# Patient Record
Sex: MALE | Race: WHITE | NOT HISPANIC OR LATINO | Employment: OTHER | URBAN - METROPOLITAN AREA
[De-identification: names, ages, dates, MRNs, and addresses within clinical notes are randomized per-mention and may not be internally consistent; named-entity substitution may affect disease eponyms.]

---

## 2020-06-09 ENCOUNTER — TELEPHONE (OUTPATIENT)
Dept: UROLOGY | Facility: MEDICAL CENTER | Age: 65
End: 2020-06-09

## 2020-07-28 ENCOUNTER — TELEPHONE (OUTPATIENT)
Dept: UROLOGY | Facility: AMBULATORY SURGERY CENTER | Age: 65
End: 2020-07-28

## 2020-07-28 ENCOUNTER — OFFICE VISIT (OUTPATIENT)
Dept: UROLOGY | Facility: AMBULATORY SURGERY CENTER | Age: 65
End: 2020-07-28
Payer: COMMERCIAL

## 2020-07-28 VITALS
TEMPERATURE: 98.2 F | BODY MASS INDEX: 26.82 KG/M2 | HEART RATE: 67 BPM | HEIGHT: 72 IN | WEIGHT: 198 LBS | SYSTOLIC BLOOD PRESSURE: 138 MMHG | DIASTOLIC BLOOD PRESSURE: 72 MMHG

## 2020-07-28 DIAGNOSIS — R97.20 ELEVATED PSA: Primary | ICD-10-CM

## 2020-07-28 PROCEDURE — 99204 OFFICE O/P NEW MOD 45 MIN: CPT | Performed by: UROLOGY

## 2020-07-28 RX ORDER — LORAZEPAM 2 MG/1
2 TABLET ORAL ONCE
Qty: 1 TABLET | Refills: 0 | Status: ON HOLD | OUTPATIENT
Start: 2020-07-28 | End: 2020-09-11 | Stop reason: ALTCHOICE

## 2020-07-28 RX ORDER — CIPROFLOXACIN 500 MG/1
500 TABLET, FILM COATED ORAL EVERY 12 HOURS SCHEDULED
Qty: 2 TABLET | Refills: 0 | Status: SHIPPED | OUTPATIENT
Start: 2020-07-28 | End: 2020-07-29

## 2020-07-28 NOTE — PROGRESS NOTES
7/28/2020    Wendi Elder  1955  [id number]        Assessment  Elevated PSA      Discussion  We discussed his PSA of almost 10 in the office today  We discussed his increased risk of prostate cancer  I recommend repeating a PSA, however, the patient is extremely fearful of additional needle sticks and blood draws  He would rather proceed directly with transrectal ultrasound guided biopsy of the prostate based on his prior PSA of 10 rather than repeating the blood work  He also has requested Ativan  I therefore have obtained informed consent today for the prostate biopsy  We discussed the infectious risk associated with the procedure  I prescribe Cipro to take both before and after the procedure  History of Present Illness  72 y o  male with a history of an elevated PSA of 10  He denies family history of prostate cancer  There are no prior PSA levels available for my review  He denies any lower urinary tract symptoms or hematuria  He states that he is otherwise in good health  He reports prior history of an inguinal hernia repair  We had a lengthy discussion in the office today regarding the significance of a PSA of almost 10  AUA Symptom Score      Review of Systems  Review of Systems   Constitutional: Negative  HENT: Negative  Eyes: Negative  Respiratory: Negative  Cardiovascular: Negative  Gastrointestinal: Negative  Endocrine: Negative  Genitourinary:        Per HPI   Musculoskeletal: Negative  Skin: Negative  Allergic/Immunologic: Negative  Neurological: Negative  Hematological: Negative  Psychiatric/Behavioral: Negative  Past Medical History  History reviewed  No pertinent past medical history  Past Social History  History reviewed  No pertinent surgical history  Past Family History  History reviewed  No pertinent family history      Past Social history  Social History     Socioeconomic History    Marital status: Single     Spouse name: Not on file    Number of children: Not on file    Years of education: Not on file    Highest education level: Not on file   Occupational History    Not on file   Social Needs    Financial resource strain: Not on file    Food insecurity:     Worry: Not on file     Inability: Not on file    Transportation needs:     Medical: Not on file     Non-medical: Not on file   Tobacco Use    Smoking status: Never Smoker    Smokeless tobacco: Never Used   Substance and Sexual Activity    Alcohol use: Yes     Alcohol/week: 10 0 standard drinks     Types: 10 Cans of beer per week     Frequency: 2-4 times a month     Drinks per session: 10 or more     Binge frequency: Weekly     Comment: Weekends    Drug use: Yes     Frequency: 2 0 times per week     Types: Marijuana     Comment: Weekends    Sexual activity: Yes     Partners: Male   Lifestyle    Physical activity:     Days per week: Not on file     Minutes per session: Not on file    Stress: Not on file   Relationships    Social connections:     Talks on phone: Not on file     Gets together: Not on file     Attends Religion service: Not on file     Active member of club or organization: Not on file     Attends meetings of clubs or organizations: Not on file     Relationship status: Not on file    Intimate partner violence:     Fear of current or ex partner: Not on file     Emotionally abused: Not on file     Physically abused: Not on file     Forced sexual activity: Not on file   Other Topics Concern    Not on file   Social History Narrative    Not on file       Current Medications  No current outpatient medications on file  No current facility-administered medications for this visit  Allergies  No Known Allergies    Past Medical History, Social History, Family History, medications and allergies were reviewed      Vitals  Vitals:    07/28/20 1451   BP: 138/72   BP Location: Left arm   Patient Position: Sitting   Cuff Size: Adult   Pulse: 67 Temp: 98 2 °F (36 8 °C)   Weight: 89 8 kg (198 lb)   Height: 6' (1 829 m)       Physical Exam  Physical Exam    On examination he is in no acute distress  Gait normal   Affect normal   Abdomen is without surgical scars  Digital rectal examination is deferred until the time of transrectal ultrasound-guided biopsy of the prostate    Neurologic is grossly intact and nonfocal     Results  No results found for: PSA  No results found for: GLUCOSE, CALCIUM, NA, K, CO2, CL, BUN, CREATININE  No results found for: WBC, HGB, HCT, MCV, PLT      Office Urine Dip  No results found for this or any previous visit (from the past 1 hour(s)) ]

## 2020-07-28 NOTE — TELEPHONE ENCOUNTER
PATIENT WAS INQUIRING ABOUT COST OF PROSTATE BIOPSY, I PLACED A CALL TO HIS BS; SPOKE TO SULLY (REF 7/28/20; 3:43PM)  HE WILL ONLY BE BILLED FOR A $40 COPAY  NO AUTH IS REQUIRED; NO DEDUC   EVERYTHING ELSE COVERED %

## 2020-07-28 NOTE — TELEPHONE ENCOUNTER
Due to scheduling change, Patient appointment for today, 7/28/2020, is to be moved from 3:45 to 2:30 pm as per Dr Lolita Stone  Spoke to Patient who was amenable to time change  Repeats back new appointment time

## 2020-08-04 NOTE — TELEPHONE ENCOUNTER
Patient of Dr Sina Gibson seen in [facility] with elevated PSA  Spoke to Patient and offered appointment on 8/12/2020  Patient declined at this time  Relayed his phobia of needles and states he doesn't think he will be able to go through with it awake or even get the antibiotic shot prior even with Ativan  States that he has an appointment with Dr Nuris Arenas on Monday to discuss removal of lipomas on his back in the OR  He would like to request that Dr Sina Gibson join Dr Nuris Arenas in the OR to do prostate biopsy at the same time  Discussed prostate biopsy, OR procedures, and scheduling at length  Patient adamant that he would like to discuss this option with Dr Sina Gibson  Routing to provider to review and advise

## 2020-08-06 NOTE — TELEPHONE ENCOUNTER
Spoke to Dr Db Wilcox who stated he would have his surgical scheduler speak with Dr Lindsay Peterson and see if able to coordinate OR schedule  Attempted to call Patient to inform but unable  LMOM to call office as unable to leave message as per communication consent  If Patient returns call, please inform him of Dr Bailey Ware recommendation and ascertain the full name of his surgeon

## 2020-08-13 ENCOUNTER — TELEPHONE (OUTPATIENT)
Dept: SURGERY | Facility: CLINIC | Age: 65
End: 2020-08-13

## 2020-08-14 ENCOUNTER — TELEPHONE (OUTPATIENT)
Dept: UROLOGY | Facility: AMBULATORY SURGERY CENTER | Age: 65
End: 2020-08-14

## 2020-08-14 ENCOUNTER — TELEPHONE (OUTPATIENT)
Dept: UROLOGY | Facility: MEDICAL CENTER | Age: 65
End: 2020-08-14

## 2020-08-14 NOTE — TELEPHONE ENCOUNTER
Pateint of Dr Sharon Askew twice to speak with nurse    Reason not given   Became irritated when request was made  Warm transfer made

## 2020-08-14 NOTE — TELEPHONE ENCOUNTER
Patient called to report that surgery with Dr Kylee Chawla would not be happening and would take place in his office  Patient was emphatic that he will not have procedure in office and would only do in OR  Informed that would discuss with Dr Eddie Rodney and call him with recommendation  Patient repeats back understanding and agrees with plan

## 2020-08-18 ENCOUNTER — TELEPHONE (OUTPATIENT)
Dept: SURGERY | Facility: CLINIC | Age: 65
End: 2020-08-18

## 2020-08-18 ENCOUNTER — PROCEDURE VISIT (OUTPATIENT)
Dept: SURGERY | Facility: CLINIC | Age: 65
End: 2020-08-18
Payer: COMMERCIAL

## 2020-08-18 VITALS
BODY MASS INDEX: 27.21 KG/M2 | DIASTOLIC BLOOD PRESSURE: 84 MMHG | SYSTOLIC BLOOD PRESSURE: 128 MMHG | TEMPERATURE: 96 F | WEIGHT: 200.6 LBS | RESPIRATION RATE: 18 BRPM | HEART RATE: 64 BPM

## 2020-08-18 DIAGNOSIS — L72.3 SEBACEOUS CYST: Primary | ICD-10-CM

## 2020-08-18 PROCEDURE — 88304 TISSUE EXAM BY PATHOLOGIST: CPT | Performed by: PATHOLOGY

## 2020-08-18 PROCEDURE — 99212 OFFICE O/P EST SF 10 MIN: CPT | Performed by: SURGERY

## 2020-08-18 PROCEDURE — 12031 INTMD RPR S/A/T/EXT 2.5 CM/<: CPT | Performed by: SURGERY

## 2020-08-18 PROCEDURE — 11402 EXC TR-EXT B9+MARG 1.1-2 CM: CPT | Performed by: SURGERY

## 2020-08-18 NOTE — TELEPHONE ENCOUNTER
Holding 9/11 to schedule prostate biopsy at the [address] [place] [address] with Dr Cornel Shaw  Will schedule once orders are placed

## 2020-08-18 NOTE — PROGRESS NOTES
Assessment/Plan:  Sebaceous cyst removed in the office here today     Diagnoses and all orders for this visit:    Sebaceous cyst  -     Skin excision          Subjective:     Patient ID: Jonathan Sneed is a 72 y o  male  The patient is a 22-year-old white male who has a sebaceous cyst on his upper back  This is annoying him and he wishes to have it removed      Review of Systems  noncontributory  Objective:     Physical Exam    Skin excision    Date/Time: 8/18/2020 3:58 PM  Performed by: Julio Cesar Qureshi MD  Authorized by: Julio Cesar Qureshi MD     Procedure Details - Skin Excision:     Number of Lesions:  1  Lesion 1:     Body area:  Trunk    Trunk location:  Back    Initial size (mm):  15       Final defect size (mm):  15    Malignancy: benign lesion      Destruction method: electrosurgery      Closure complexity: intermediate      Surgical defect:  1 5    Repair size (cm):  1 5     The patient was identified by me and placed in the right lateral decubitus position the area was marked and prepped and draped in a normal surgical manner  1% neutralized lidocaine with epinephrine was infused and then elliptical incision was made with a knife and completed with scissors and a Bovie  Base was cauterized and then closure was 2 interrupted Monocryls followed by running Monocryl in the skin    Dermabond was then applied

## 2020-08-19 ENCOUNTER — TELEPHONE (OUTPATIENT)
Dept: UROLOGY | Facility: AMBULATORY SURGERY CENTER | Age: 65
End: 2020-08-19

## 2020-08-19 ENCOUNTER — PREP FOR PROCEDURE (OUTPATIENT)
Dept: UROLOGY | Facility: AMBULATORY SURGERY CENTER | Age: 65
End: 2020-08-19

## 2020-08-19 DIAGNOSIS — R97.20 ELEVATED PSA: Primary | ICD-10-CM

## 2020-08-19 RX ORDER — CEFAZOLIN SODIUM 2 G/50ML
2000 SOLUTION INTRAVENOUS ONCE
Status: CANCELLED | OUTPATIENT
Start: 2020-08-19 | End: 2020-08-19

## 2020-08-19 NOTE — TELEPHONE ENCOUNTER
I called pt and was able to speak with him  I confirmed that I was holding 9/11/20 at the [facility] for his prostate biopsy with Dr Freda West  As soon as Dr Freda West places the orders for this case I will call him back to confirm his pre op instructions and prep information  Pt verbalized understanding and will wait to hear back from me

## 2020-08-20 NOTE — TELEPHONE ENCOUNTER
Patient scheduled for pre op appointment with Dr Enriquez Kidney on 8/27/2020 at 9:15 am in [place]  Please call Patient to inform/confirm appointment

## 2020-08-21 NOTE — TELEPHONE ENCOUNTER
LMOM on home and mobile numbers to call office and office number given  Unable to leave detailed message due to communication consent

## 2020-08-21 NOTE — TELEPHONE ENCOUNTER
Pt called regarding missed call I tried to confirm scheduled appointment,pt questioned what was it for I tried to explain he then proceeded to say the date could be a problem,will send back to clinical member

## 2020-08-21 NOTE — TELEPHONE ENCOUNTER
Spoke to Patient who reported he may have work on 8/27  Discussed importance of pre op appointment and no other appointments available at this time  Patient repeats back understanding and agrees to appointment  Patient is requesting that Dr Olena Yadav be made aware that he has an appointment with Dr Deneen Dudley on Monday for referral for endoscopy  States he would like to have it performed the same time as his biopsy  Informed that would discuss with Dr Olena Yadav and Liz Moreira and discussed surgery/scheduling process  Patient repeats back understanding  Spoke with Dr Olena Yadav - states he will contact Patient to discuss

## 2020-08-24 ENCOUNTER — PREP FOR PROCEDURE (OUTPATIENT)
Dept: UROLOGY | Facility: AMBULATORY SURGERY CENTER | Age: 65
End: 2020-08-24

## 2020-08-24 DIAGNOSIS — R39.89 SUSPECTED UTI: ICD-10-CM

## 2020-08-24 DIAGNOSIS — Z01.810 PRE-OPERATIVE CARDIOVASCULAR EXAMINATION: ICD-10-CM

## 2020-08-24 DIAGNOSIS — R97.20 ELEVATED PSA: Primary | ICD-10-CM

## 2020-08-24 NOTE — TELEPHONE ENCOUNTER
I spoke with pt this afternoon and confirmed that he is scheduled for his prostate biopsy at the [address] [place] [address] on 9/11/20  I verbally went over all of pt 's pre op instructions and prep information with him  While I was informing pt that he needs a EKG done as soon as possible the call was disconnected  I will try to reach pt back a little later today and then I will also mail him a copy of his surgical packet

## 2020-08-27 ENCOUNTER — OFFICE VISIT (OUTPATIENT)
Dept: UROLOGY | Facility: AMBULATORY SURGERY CENTER | Age: 65
End: 2020-08-27
Payer: COMMERCIAL

## 2020-08-27 VITALS
BODY MASS INDEX: 27.09 KG/M2 | HEART RATE: 65 BPM | TEMPERATURE: 97.8 F | SYSTOLIC BLOOD PRESSURE: 128 MMHG | DIASTOLIC BLOOD PRESSURE: 84 MMHG | WEIGHT: 200 LBS | HEIGHT: 72 IN

## 2020-08-27 DIAGNOSIS — R97.20 ELEVATED PSA: Primary | ICD-10-CM

## 2020-08-27 PROCEDURE — 99214 OFFICE O/P EST MOD 30 MIN: CPT | Performed by: UROLOGY

## 2020-08-27 NOTE — PROGRESS NOTES
8/27/2020    Sari [facility]  1955  [id number]        Assessment  Elevated PSA      Discussion  I recommend transrectal ultrasound-guided biopsy of the prostate  The patient is amenable with this plan in the operating room under anesthesia  Risk and benefits of the procedure discussed and reviewed  Informed consent obtained  Preoperative ceftriaxone will be administered on the day of surgery        History of Present Illness  72 y o  male with a history of an elevated PSA of 10  I recommended a transrectal ultrasound-guided biopsy of the prostate  The patient has refused to do the biopsy in the office as he states he easily passes out even with blood draws  He does not wish to repeat a PSA  He wishes to proceed directly with transrectal ultrasound-guided biopsy of the prostate in the operating room with sedation  AUA Symptom Score  AUA SYMPTOM SCORE      Most Recent Value   AUA SYMPTOM SCORE   How often have you had a sensation of not emptying your bladder completely after you finished urinating? 0   How often have you had to urinate again less than two hours after you finished urinating? 0   How often have you found you stopped and started again several times when you urinate?  0   How often have you found it difficult to postpone urination? 0   How often have you had a weak urinary stream?  0   How often have you had to push or strain to begin urination? 0   How many times did you most typically get up to urinate from the time you went to bed at night until the time you got up in the morning? 1   Quality of Life: If you were to spend the rest of your life with your urinary condition just the way it is now, how would you feel about that?  0   AUA SYMPTOM SCORE  1          Review of Systems  Review of Systems   Constitutional: Negative  HENT: Negative  Eyes: Negative  Respiratory: Negative  Cardiovascular: Negative  Gastrointestinal: Negative  Endocrine: Negative  Genitourinary:        Per HPI   Musculoskeletal: Negative  Skin: Negative  Allergic/Immunologic: Negative  Neurological: Negative  Hematological: Negative  Psychiatric/Behavioral: Negative  Past Medical History  Past Medical History:   Diagnosis Date    Anal fissure        Past Social History  Past Surgical History:   Procedure Laterality Date    [facility]     HERNIA REPAIR      inguinal    SPERMATOCELECTOMY  02/2018    right spermatocelectomy and multiple lipoma removal       Past Family History  Family History   Family history unknown: Yes       Past Social history  Social History     Socioeconomic History    Marital status: Single     Spouse name: Not on file    Number of children: Not on file    Years of education: Not on file    Highest education level: Not on file   Occupational History    Occupation: self employed   Social Needs    Financial resource strain: Not on file    Food insecurity     Worry: Not on file     Inability: Not on file   [employer] needs     Medical: Not on file     Non-medical: Not on file   Tobacco Use    Smoking status: Former Smoker     Packs/day: 1 00     Years: 3 00     Pack years: 3 00    Smokeless tobacco: Never Used   Substance and Sexual Activity    Alcohol use:  Yes     Alcohol/week: 10 0 standard drinks     Types: 10 Cans of beer per week     Frequency: 2-4 times a month     Drinks per session: 10 or more     Binge frequency: Weekly     Comment: Weekends    Drug use: Yes     Frequency: 2 0 times per week     Types: Marijuana     Comment: Weekends    Sexual activity: Yes     Partners: Male   Lifestyle    Physical activity     Days per week: Not on file     Minutes per session: Not on file    Stress: Not on file   Relationships    Social connections     Talks on phone: Not on file     Gets together: Not on file     Attends [employer] service: Not on file     Active member of club or organization: Not on file     Attends meetings of clubs or organizations: Not on file     Relationship status: Not on file    Intimate partner violence     Fear of current or ex partner: Not on file     Emotionally abused: Not on file     Physically abused: Not on file     Forced sexual activity: Not on file   Other Topics Concern    Not on file   Social History Narrative    Most recent tobacco use screenin2018    Live alone or with others: with others    Marital status: Single    Occupation: self employed    Are you currently employed: Yes    Alcohol intake: Occasional       Current Medications  Current Outpatient Medications   Medication Sig Dispense Refill    LORazepam (ATIVAN) 2 mg tablet Take 1 tablet (2 mg total) by mouth once for 1 dose Take one hour prior to procedure 1 tablet 0     No current facility-administered medications for this visit  Allergies  No Known Allergies    Past Medical History, Social History, Family History, medications and allergies were reviewed  Vitals  Vitals:    20 0931   BP: 128/84   Pulse: 65   Temp: 97 8 °F (36 6 °C)   Weight: 90 7 kg (200 lb)   Height: 6' (1 829 m)       Physical Exam  Physical Exam    On examination he is in no acute distress  Lungs are clear  Cardiac is regular  Abdomen is soft nontender nondistended   examination reveals no CVA tenderness  Skin is warm  Extremities without edema  Neurologic is grossly intact and nonfocal   Gait normal   Affect nor    Results  No results found for: PSA  No results found for: GLUCOSE, CALCIUM, NA, K, CO2, CL, BUN, CREATININE  No results found for: WBC, HGB, HCT, MCV, PLT      Office Urine Dip  No results found for this or any previous visit (from the past 1 hour(s))  ]      Total visit time was 25 minutes of which over 50% was spent on counseling

## 2020-08-27 NOTE — H&P (VIEW-ONLY)
8/27/2020    [facility]  1955  [id number]        Assessment  Elevated PSA      Discussion  I recommend transrectal ultrasound-guided biopsy of the prostate  The patient is amenable with this plan in the operating room under anesthesia  Risk and benefits of the procedure discussed and reviewed  Informed consent obtained  Preoperative ceftriaxone will be administered on the day of surgery        History of Present Illness  72 y o  male with a history of an elevated PSA of 10  I recommended a transrectal ultrasound-guided biopsy of the prostate  The patient has refused to do the biopsy in the office as he states he easily passes out even with blood draws  He does not wish to repeat a PSA  He wishes to proceed directly with transrectal ultrasound-guided biopsy of the prostate in the operating room with sedation  AUA Symptom Score  AUA SYMPTOM SCORE      Most Recent Value   AUA SYMPTOM SCORE   How often have you had a sensation of not emptying your bladder completely after you finished urinating? 0   How often have you had to urinate again less than two hours after you finished urinating? 0   How often have you found you stopped and started again several times when you urinate?  0   How often have you found it difficult to postpone urination? 0   How often have you had a weak urinary stream?  0   How often have you had to push or strain to begin urination? 0   How many times did you most typically get up to urinate from the time you went to bed at night until the time you got up in the morning? 1   Quality of Life: If you were to spend the rest of your life with your urinary condition just the way it is now, how would you feel about that?  0   AUA SYMPTOM SCORE  1          Review of Systems  Review of Systems   Constitutional: Negative  HENT: Negative  Eyes: Negative  Respiratory: Negative  Cardiovascular: Negative  Gastrointestinal: Negative  Endocrine: Negative  Genitourinary:        Per HPI   Musculoskeletal: Negative  Skin: Negative  Allergic/Immunologic: Negative  Neurological: Negative  Hematological: Negative  Psychiatric/Behavioral: Negative  Past Medical History  Past Medical History:   Diagnosis Date    Anal fissure        Past Social History  Past Surgical History:   Procedure Laterality Date    [facility]     HERNIA REPAIR      inguinal    SPERMATOCELECTOMY  02/2018    right spermatocelectomy and multiple lipoma removal       Past Family History  Family History   Family history unknown: Yes       Past Social history  Social History     Socioeconomic History    Marital status: Single     Spouse name: Not on file    Number of children: Not on file    Years of education: Not on file    Highest education level: Not on file   Occupational History    Occupation: self employed   Social Needs    Financial resource strain: Not on file    Food insecurity     Worry: Not on file     Inability: Not on file   [employer] needs     Medical: Not on file     Non-medical: Not on file   Tobacco Use    Smoking status: Former Smoker     Packs/day: 1 00     Years: 3 00     Pack years: 3 00    Smokeless tobacco: Never Used   Substance and Sexual Activity    Alcohol use:  Yes     Alcohol/week: 10 0 standard drinks     Types: 10 Cans of beer per week     Frequency: 2-4 times a month     Drinks per session: 10 or more     Binge frequency: Weekly     Comment: Weekends    Drug use: Yes     Frequency: 2 0 times per week     Types: Marijuana     Comment: Weekends    Sexual activity: Yes     Partners: Male   Lifestyle    Physical activity     Days per week: Not on file     Minutes per session: Not on file    Stress: Not on file   Relationships    Social connections     Talks on phone: Not on file     Gets together: Not on file     Attends [employer] service: Not on file     Active member of club or organization: Not on file     Attends meetings of clubs or organizations: Not on file     Relationship status: Not on file    Intimate partner violence     Fear of current or ex partner: Not on file     Emotionally abused: Not on file     Physically abused: Not on file     Forced sexual activity: Not on file   Other Topics Concern    Not on file   Social History Narrative    Most recent tobacco use [email or web address]    Live alone or with others: with others    Marital status: Single    Occupation: self employed    Are you currently employed: Yes    Alcohol intake: Occasional       Current Medications  Current Outpatient Medications   Medication Sig Dispense Refill    LORazepam (ATIVAN) 2 mg tablet Take 1 tablet (2 mg total) by mouth once for 1 dose Take one hour prior to procedure 1 tablet 0     No current facility-administered medications for this visit  Allergies  No Known Allergies    Past Medical History, Social History, Family History, medications and allergies were reviewed  Vitals  Vitals:    20 0931   BP: 128/84   Pulse: 65   Temp: 97 8 °F (36 6 °C)   Weight: 90 7 kg (200 lb)   Height: 6' (1 829 m)       Physical Exam  Physical Exam    On examination he is in no acute distress  Lungs are clear  Cardiac is regular  Abdomen is soft nontender nondistended   examination reveals no CVA tenderness  Skin is warm  Extremities without edema  Neurologic is grossly intact and nonfocal   Gait normal   Affect nor    Results  No results found for: PSA  No results found for: GLUCOSE, CALCIUM, NA, K, CO2, CL, BUN, CREATININE  No results found for: WBC, HGB, HCT, MCV, PLT      Office Urine Dip  No results found for this or any previous visit (from the past 1 hour(s))  ]      Total visit time was 25 minutes of which over 50% was spent on counseling

## 2020-08-31 NOTE — PRE-PROCEDURE INSTRUCTIONS
No outpatient medications have been marked as taking for the 9/11/20 encounter [facility] Encounter)  PATIENT TAKES NO MEDICATIONS  MASK AND VISITOR GUIDELINES REVIEWED  REVIEWED  PRINTED SURGICAL INSTRUCTIONS WITH PATIENT , PATIENT VERBALIZED UNDERSTANDING  MEDICATIONS REVIEWED  NO VITAMINS OR NSAIDS ONE WEEK BEFORE SURGERY, NPO AFTER MIDNIGHT   SOAP AND SHOWER INSTRUCTIONS REVIEWED

## 2020-08-31 NOTE — TELEPHONE ENCOUNTER
I called pt this morning to confirm that he received his surgery packet that was mailed to him  Pt confirmed that he did have it and was able to review all of the information  I asked pt when he thought he would be going for his EKG  Pt then stated that Dr Alex Hernandez told him that he did not need any testing prior to this procedure  I did try to explain that was not true and the EKG was anesthesia's guidelines and that anyone over the age of 61 needed to have one prior to surgery, Once again pt stated that he did not need to have any pre op testing done per Dr Alex Hernandez and that I needed to go talk to him  Pt then hung up the phone

## 2020-09-08 ENCOUNTER — TELEPHONE (OUTPATIENT)
Dept: RADIOLOGY | Facility: HOSPITAL | Age: 65
End: 2020-09-08

## 2020-09-10 ENCOUNTER — ANESTHESIA EVENT (OUTPATIENT)
Dept: PERIOP | Facility: HOSPITAL | Age: 65
End: 2020-09-10
Payer: COMMERCIAL

## 2020-09-11 ENCOUNTER — HOSPITAL ENCOUNTER (OUTPATIENT)
Facility: HOSPITAL | Age: 65
Setting detail: OUTPATIENT SURGERY
Discharge: HOME | End: 2020-09-11
Attending: UROLOGY | Admitting: UROLOGY
Payer: COMMERCIAL

## 2020-09-11 ENCOUNTER — ANESTHESIA (OUTPATIENT)
Dept: PERIOP | Facility: HOSPITAL | Age: 65
End: 2020-09-11
Payer: COMMERCIAL

## 2020-09-11 ENCOUNTER — APPOINTMENT (OUTPATIENT)
Dept: RADIOLOGY | Facility: HOSPITAL | Age: 65
End: 2020-09-11
Attending: UROLOGY
Payer: COMMERCIAL

## 2020-09-11 VITALS
HEART RATE: 61 BPM | OXYGEN SATURATION: 99 % | DIASTOLIC BLOOD PRESSURE: 92 MMHG | SYSTOLIC BLOOD PRESSURE: 140 MMHG | RESPIRATION RATE: 18 BRPM | TEMPERATURE: 98.6 F

## 2020-09-11 VITALS — HEART RATE: 69 BPM

## 2020-09-11 DIAGNOSIS — R97.20 ELEVATED PSA: ICD-10-CM

## 2020-09-11 DIAGNOSIS — R97.20 ELEVATED PROSTATE SPECIFIC ANTIGEN (PSA): ICD-10-CM

## 2020-09-11 LAB
ATRIAL RATE: 60 BPM
ATRIAL RATE: 62 BPM
P AXIS: 11 DEGREES
P AXIS: 27 DEGREES
PR INTERVAL: 152 MS
PR INTERVAL: 166 MS
QRS AXIS: -23 DEGREES
QRS AXIS: -23 DEGREES
QRSD INTERVAL: 80 MS
QRSD INTERVAL: 80 MS
QT INTERVAL: 430 MS
QT INTERVAL: 432 MS
QTC INTERVAL: 432 MS
QTC INTERVAL: 436 MS
T WAVE AXIS: -4 DEGREES
T WAVE AXIS: -6 DEGREES
VENTRICULAR RATE: 60 BPM
VENTRICULAR RATE: 62 BPM

## 2020-09-11 PROCEDURE — 76942 ECHO GUIDE FOR BIOPSY: CPT

## 2020-09-11 PROCEDURE — 55700 PR BIOPSY OF PROSTATE,NEEDLE/PUNCH: CPT | Performed by: UROLOGY

## 2020-09-11 PROCEDURE — G0416 PROSTATE BIOPSY, ANY MTHD: HCPCS | Performed by: PATHOLOGY

## 2020-09-11 PROCEDURE — 88344 IMHCHEM/IMCYTCHM EA MLT ANTB: CPT | Performed by: PATHOLOGY

## 2020-09-11 PROCEDURE — 93005 ELECTROCARDIOGRAM TRACING: CPT

## 2020-09-11 PROCEDURE — 93010 ELECTROCARDIOGRAM REPORT: CPT | Performed by: INTERNAL MEDICINE

## 2020-09-11 RX ORDER — CEFAZOLIN SODIUM 2 G/50ML
2000 SOLUTION INTRAVENOUS ONCE
Status: DISCONTINUED | OUTPATIENT
Start: 2020-09-11 | End: 2020-09-11

## 2020-09-11 RX ORDER — ONDANSETRON 2 MG/ML
INJECTION INTRAMUSCULAR; INTRAVENOUS AS NEEDED
Status: DISCONTINUED | OUTPATIENT
Start: 2020-09-11 | End: 2020-09-11

## 2020-09-11 RX ORDER — FENTANYL CITRATE/PF 50 MCG/ML
50 SYRINGE (ML) INJECTION
Status: DISCONTINUED | OUTPATIENT
Start: 2020-09-11 | End: 2020-09-11 | Stop reason: HOSPADM

## 2020-09-11 RX ORDER — MIDAZOLAM HYDROCHLORIDE 2 MG/2ML
INJECTION, SOLUTION INTRAMUSCULAR; INTRAVENOUS AS NEEDED
Status: DISCONTINUED | OUTPATIENT
Start: 2020-09-11 | End: 2020-09-11

## 2020-09-11 RX ORDER — CIPROFLOXACIN 500 MG/1
500 TABLET, FILM COATED ORAL 2 TIMES DAILY
Qty: 6 TABLET | Refills: 0 | Status: SHIPPED | OUTPATIENT
Start: 2020-09-11 | End: 2020-09-14

## 2020-09-11 RX ORDER — LIDOCAINE HYDROCHLORIDE 10 MG/ML
INJECTION, SOLUTION EPIDURAL; INFILTRATION; INTRACAUDAL; PERINEURAL AS NEEDED
Status: DISCONTINUED | OUTPATIENT
Start: 2020-09-11 | End: 2020-09-11

## 2020-09-11 RX ORDER — PROPOFOL 10 MG/ML
INJECTION, EMULSION INTRAVENOUS AS NEEDED
Status: DISCONTINUED | OUTPATIENT
Start: 2020-09-11 | End: 2020-09-11

## 2020-09-11 RX ORDER — ONDANSETRON 2 MG/ML
4 INJECTION INTRAMUSCULAR; INTRAVENOUS ONCE AS NEEDED
Status: DISCONTINUED | OUTPATIENT
Start: 2020-09-11 | End: 2020-09-11 | Stop reason: HOSPADM

## 2020-09-11 RX ORDER — SODIUM CHLORIDE, SODIUM LACTATE, POTASSIUM CHLORIDE, CALCIUM CHLORIDE 600; 310; 30; 20 MG/100ML; MG/100ML; MG/100ML; MG/100ML
50 INJECTION, SOLUTION INTRAVENOUS CONTINUOUS
Status: DISCONTINUED | OUTPATIENT
Start: 2020-09-11 | End: 2020-09-11 | Stop reason: HOSPADM

## 2020-09-11 RX ADMIN — CEFTRIAXONE 1000 MG: 1 INJECTION, POWDER, FOR SOLUTION INTRAMUSCULAR; INTRAVENOUS at 07:39

## 2020-09-11 RX ADMIN — SODIUM CHLORIDE, SODIUM LACTATE, POTASSIUM CHLORIDE, AND CALCIUM CHLORIDE: .6; .31; .03; .02 INJECTION, SOLUTION INTRAVENOUS at 07:38

## 2020-09-11 RX ADMIN — MIDAZOLAM 2 MG: 1 INJECTION INTRAMUSCULAR; INTRAVENOUS at 07:34

## 2020-09-11 RX ADMIN — ONDANSETRON 4 MG: 2 INJECTION INTRAMUSCULAR; INTRAVENOUS at 07:59

## 2020-09-11 RX ADMIN — PROPOFOL 200 MG: 10 INJECTION, EMULSION INTRAVENOUS at 07:44

## 2020-09-11 RX ADMIN — LIDOCAINE HYDROCHLORIDE 50 MG: 10 INJECTION, SOLUTION EPIDURAL; INFILTRATION; INTRACAUDAL; PERINEURAL at 07:42

## 2020-09-11 NOTE — DISCHARGE INSTRUCTIONS
Ronny,    Today you underwent prostate biopsy  Expect some blood in the stool, urine, and in the ejaculate  Ciprofloxacin was prescribed for 3 days  Avoid heavy lifting or exertion for the next 2 days  Call for follow-up in the next 2 weeks to review pathology results  [phone]    Dr Charo Franco    Prostate Gland Needle Biopsy   WHAT YOU NEED TO KNOW:   A prostate gland needle biopsy is a procedure to remove samples of tissue from your prostate gland  The prostate is a gland in men located just below the bladder and surrounds the urethra (tube that carries urine out of the body)  After the samples are removed, they are sent to a lab and tested for cancer  DISCHARGE INSTRUCTIONS:   Medicines:   · Antibiotics: This medicine is given to help treat or prevent an infection caused by bacteria  · Pain medicine: You may be given a prescription medicine to decrease pain  Do not wait until the pain is severe before you take this medicine  · Take your medicine as directed  Contact your healthcare provider if you think your medicine is not helping or if you have side effects  Tell him or her if you are allergic to any medicine  Keep a list of the medicines, vitamins, and herbs you take  Include the amounts, and when and why you take them  Bring the list or the pill bottles to follow-up visits  Carry your medicine list with you in case of an emergency  Follow up with your healthcare provider or urologist as directed: You may need to return for more tests or procedures  Write down your questions so you remember to ask them during your visits  Self-care:   · Eat healthy foods:  Healthy foods include fruits, vegetables, whole-grain breads, low-fat dairy products, beans, lean meats, and fish  Eat foods low in animal fat and saturated fats to help decrease your risk for prostate cancer  · Limit alcohol:  You should limit alcohol to 2 drinks a day   A drink of alcohol is 12 ounces of beer, 5 ounces of wine, or 1½ ounces of liquor  Contact your healthcare provider or urologist if:   · You cannot get an erection  · You feel pain or burning when you urinate  · You feel weak, and your face is hot and red  · You have a fever or chills  · You see blood in your urine, bowel movements, or semen  · Your urine is cloudy or smells foul  · You have questions or concerns about your condition or care  Seek care immediately or call 911 if:   · You bleed from your rectum  · You urinate very little or not at all  · You have pain from your procedure that gets worse, even after you take pain medicine  © 2017 [employer] Information is for End User's use only and may not be sold, redistributed or otherwise used for commercial purposes  All illustrations and images included in CareNotes® are the copyrighted property of [employer]  or [employer]  The above information is an  only  It is not intended as medical advice for individual conditions or treatments  Talk to your doctor, nurse or pharmacist before following any medical regimen to see if it is safe and effective for you

## 2020-09-11 NOTE — OP NOTE
OPERATIVE REPORT  PATIENT NAME: Aamir Ulrich    :  1955  MRN: [id number]  Pt Location: [facility]    SURGERY DATE: 2020    Surgeon(s) and Role:     Roberto Hilario MD - Primary    Preop Diagnosis:  Elevated PSA [R97 20]    Post-Op Diagnosis Codes:     * Elevated PSA [R97 20]    Procedure(s) (LRB):  TRANSRECTAL NEEDLE BIOPSY PROSTATE (TRNBP) (N/A)  Ultrasound-guided    Specimen(s):  ID Type Source Tests Collected by Time Destination   1 : [facility] Tissue Prostate TISSUE EXAM Alex Mckenna MD 202048    2 : [facility] Tissue Prostate TISSUE EXAM Alex Mckenna MD 202049    3 : [facility] Tissue Prostate TISSUE EXAM Alex Mckenna MD 2020 0749    4 : [facility] Tissue Prostate TISSUE EXAM Alex Mckenna MD 2020 0750        Estimated Blood Loss:   Minimal    Drains:  None    Anesthesia Type:   General    Operative Indications:  Elevated PSA [R97 20]      Operative Findings:  53 g prostate standard transrectal ultrasound-guided biopsy of the prostate    Complications:   None    Procedure and Technique:  Ariane Daly is a 80-year-old male with a PSA of 9 8  He refused transrectal ultrasound-guided biopsy in the office without sedation  He states that he was not even amenable to repeating a PSA as he does not like to have blood work performed  He is extremely anxious about having any procedures done without anesthesia  Risk and benefits of transrectal ultrasound-guided biopsy of the prostate in the operating room sedation were discussed and reviewed  Informed consent was obtained  The patient was brought to the operating room on 2020  After the smooth induction of IV anesthesia, the patient was placed into a lateral decubitus position  A time-out was performed with all members of the operative team confirming the patient's identity and procedure to be performed  Intravenous ceftriaxone was administered  Transrectal ultrasonography was performed  The prostate measured 53 g    A standard 12 core biopsy was then performed  Three cores were taken from each peripheral zone and 3 cores from each central zone bilaterally  Overall the patient tolerated the procedure well number no complications  The patient was transferred to the recovery unit in stable condition at the conclusion of the case        Patient Disposition:  Stable recovery room    SIGNATURE: Celi Ch MD  DATE: September 11, 2020  TIME: 7:59 AM

## 2020-09-11 NOTE — ANESTHESIA PREPROCEDURE EVALUATION
Procedure:  TRANSRECTAL NEEDLE BIOPSY PROSTATE (TRNBP) (N/A Abdomen)    Relevant Problems   ANESTHESIA (within normal limits)      CARDIO (within normal limits)      ENDO (within normal limits)      GI/HEPATIC (within normal limits)      HEMATOLOGY (within normal limits)      NEURO/PSYCH (within normal limits)      PULMONARY (within normal limits)        Physical Exam    Airway    Mallampati score: II  TM Distance: >3 FB  Neck ROM: full     Dental   No notable dental hx     Cardiovascular      Pulmonary      Other Findings        Anesthesia Plan  ASA Score- 2     Anesthesia Type- general with ASA Monitors  Additional Monitors:   Airway Plan: LMA  Plan Factors-Exercise tolerance (METS): >4 METS  Chart reviewed  Patient summary reviewed  Patient is not a current smoker  Induction- intravenous  Postoperative Plan- Plan for postoperative opioid use  Planned trial extubation    Informed Consent- Anesthetic plan and risks discussed with patient

## 2020-09-11 NOTE — INTERVAL H&P NOTE
H&P reviewed  After examining the patient I find no changes in the patients condition since the H&P had been written  Vitals:    09/11/20 0552   BP: 157/98   Pulse: 60   Resp: 20   Temp: (!) 96 5 °F (35 8 °C)   SpO2: 100%     Plan for transrectal ultrasound-guided biopsy of the prostate with sedation in the operating room  Risk and benefits of the procedure discussed and reviewed  Informed consent obtained

## 2020-09-11 NOTE — ANESTHESIA POSTPROCEDURE EVALUATION
Post-Op Assessment Note    CV Status:  Stable    Pain management: adequate     Mental Status:  Alert and awake   Hydration Status:  Euvolemic   PONV Controlled:  Controlled   Airway Patency:  Patent      Post Op Vitals Reviewed: Yes      Staff: Anesthesiologist         No complications documented      BP   125/82   Temp  97 0   Pulse  75   Resp      SpO2   98

## 2020-09-14 ENCOUNTER — TELEPHONE (OUTPATIENT)
Dept: UROLOGY | Facility: AMBULATORY SURGERY CENTER | Age: 65
End: 2020-09-14

## 2020-09-14 NOTE — TELEPHONE ENCOUNTER
Post Op Note    Brennon Bliss is a 72 y o  male s/p Transrectal needle biospy performed 09/11/2020  Brennon Bliss is a patient of Dr Karina Pfeiffer and is seen at the [place] office  How would you rate your pain on a scale from 1 to 10, 10 being the worst pain ever? 0  Have you had a fever? No    Have your bowel movements been regular? Yes  Do you have any difficulty urinating? No  Bartolo you have any other questions or concerns that I can address at this time? Patient states he has blood in semen but not in his stool and urine    Reviewed follow up his appointment for 9/29/20

## 2020-09-21 NOTE — TELEPHONE ENCOUNTER
Patient requesting results of Biopsy  Advised results are not in  Patient requesting to speak to nurse

## 2020-09-21 NOTE — TELEPHONE ENCOUNTER
Call returned to patient, I advised that we generally do not discuss pathology results over the phone as there are normally questions for the physician as well as discussion of plan of care  Confirmed appointment for 9/29 with Dr Olena Yadav  Patient also questioned how long he may notice blood in the semen  I advised that this could be for up to six weeks  Patient states it is lighter than it was immediately after biopsy  Advised patient to continue to monitor and to call with any concerns  Patient verbalized understanding and agrees with plan

## 2020-09-29 ENCOUNTER — OFFICE VISIT (OUTPATIENT)
Dept: UROLOGY | Facility: AMBULATORY SURGERY CENTER | Age: 65
End: 2020-09-29
Payer: COMMERCIAL

## 2020-09-29 ENCOUNTER — TELEPHONE (OUTPATIENT)
Dept: UROLOGY | Facility: AMBULATORY SURGERY CENTER | Age: 65
End: 2020-09-29

## 2020-09-29 VITALS
SYSTOLIC BLOOD PRESSURE: 122 MMHG | BODY MASS INDEX: 27.09 KG/M2 | DIASTOLIC BLOOD PRESSURE: 88 MMHG | TEMPERATURE: 98 F | WEIGHT: 200 LBS | HEIGHT: 72 IN

## 2020-09-29 DIAGNOSIS — C61 PROSTATE CANCER (HCC): Primary | ICD-10-CM

## 2020-09-29 PROCEDURE — 99215 OFFICE O/P EST HI 40 MIN: CPT | Performed by: UROLOGY

## 2020-09-29 NOTE — LETTER
September 29, 2020     Emmett Stewart MD  [address]    Patient: Erick Gamble   YOB: 1955   Date of Visit: 9/29/2020       Dear Dr Krzysztof Davis: Thank you for referring Erick Gamble to me for evaluation  Below are my notes for this consultation  If you have questions, please do not hesitate to call me  I look forward to following your patient along with you  Sincerely,        Elio Frank MD        CC: MD Elio Quijano MD  9/29/2020  4:54 PM  Sign when Signing Visit  9/29/2020    Erick Gamble  1955  [id number]        Assessment  [place] 4+3=7/10 prostate cancer      Discussion    I had a lengthy discussion in the office with the patient today regarding his new diagnosis of [place] 4 + 3 disease  We discussed that 4 of 6 cores are positive on the left side of the prostate in up to 80% of the cores  We discussed that this intermediate to high risk prostate cancer requires treatment and that active surveillance is not an option  We discussed either external beam radiation therapy or robot assisted laparoscopic prostatectomy for definitive treatment of his prostate cancer  He is vehemently opposed to undergoing surgery at this time even though I explained to him that at only 72years of age in relatively good health that he may benefit overall in the long-term from surgery  He strongly wishes to consider external beam radiation therapy  In addition we discussed that if he were to have surgical extirpation of his prostate and to have a PSA recurrence thereafter that he could receive adjuvant radiation therapy  We discussed that if he receives radiation therapy upfront that surgery is typically not an option if he has disease recurrence  As part of staging I recommend a CT scan of the abdomen and pelvis  He will be referred to Radiation Oncology    He will return in the next 3 weeks to receive a Lupron injection per NCCN guidelines prior to radiation therapy as he has [place] 4 + 3 disease  He will then require IMRT marker insertion as well as Space Oar in the operating room  The patient is amenable with this plan  History of Present Illness  72 y o  male with a history of a PSA of 9 8 from May 2020  I 1st saw Amber Pandey in the office in July 2020  He did not wish to have the PSA repeated  He recently underwent transrectal ultrasound-guided biopsy of the prostate  This was performed in the operating room with sedation/anesthesia  He returns in follow-up today  He denies any post biopsy sequelae  We discussed and reviewed his pathology report at length in the office today  On the right side of the prostate there are no positive cores  On the left side of the prostate 4 of 6 cores obtained are positive for [place] 4 + 3 involving between 5 in 80% of the cores  [place] pattern 4 is identified in between 60 and 80% of the specimens  The patient states that his father had bladder cancer and underwent cystectomy with ileal conduit  He is extremely hesitant to proceed with any type of surgical intervention  I made it very clear to him that he would not have an ileal conduit if he underwent robot assisted laparoscopic prostatectomy  AUA Symptom Score      Review of Systems  Review of Systems   Constitutional: Negative  HENT: Negative  Eyes: Negative  Respiratory: Negative  Cardiovascular: Negative  Gastrointestinal: Negative  Endocrine: Negative  Genitourinary:        Per HPI   Musculoskeletal: Negative  Skin: Negative  Allergic/Immunologic: Negative  Neurological: Negative  Hematological: Negative  Psychiatric/Behavioral: Negative            Past Medical History  Past Medical History:   Diagnosis Date    Anal fissure        Past Social History  Past Surgical History:   Procedure Laterality Date    [facility]  05/2020    [facility]     HERNIA REPAIR      inguinal    SD BIOPSY OF PROSTATE,NEEDLE/PUNCH N/A 2020    Procedure: TRANSRECTAL NEEDLE BIOPSY PROSTATE (TRNBP); Surgeon: Smiley Arauz MD;  [facility];  Service: Urology    SPERMATOCELECTOMY  2018    right spermatocelectomy and multiple lipoma removal    US GUIDED PROSTATE BIOPSY  2020       Past Family History  Family History   Family history unknown: Yes       Past Social history  Social History     Socioeconomic History    Marital status: Single     Spouse name: Not on file    Number of children: Not on file    Years of education: Not on file    Highest education level: Not on file   Occupational History    Occupation: self employed   Social Needs    Financial resource strain: Not on file    Food insecurity     Worry: Not on file     Inability: Not on file   [employer] needs     Medical: Not on file     Non-medical: Not on file   Tobacco Use    Smoking status: Former Smoker     Packs/day: 1 00     Years: 3 00     Pack years: 3 00     Last attempt to quit:      Years since quittin 7    Smokeless tobacco: Never Used   Substance and Sexual Activity    Alcohol use:  Yes     Alcohol/week: 10 0 standard drinks     Types: 10 Cans of beer per week     Frequency: 2-4 times a month     Drinks per session: 10 or more     Binge frequency: Weekly     Comment: Weekends    Drug use: Yes     Frequency: 2 0 times per week     Types: Marijuana     Comment: Weekends    Sexual activity: Yes     Partners: Female   Lifestyle    Physical activity     Days per week: Not on file     Minutes per session: Not on file    Stress: Not on file   Relationships    Social connections     Talks on phone: Not on file     Gets together: Not on file     Attends Yazidism service: Not on file     Active member of club or organization: Not on file     Attends meetings of clubs or organizations: Not on file     Relationship status: Not on file    Intimate partner violence     Fear of current or ex partner: Not on file Emotionally abused: Not on file     Physically abused: Not on file     Forced sexual activity: Not on file   Other Topics Concern    Not on file   Social History Narrative    Most recent tobacco use screenin2018    Live alone or with others: with others    Marital status: Single    Occupation: self employed    Are you currently employed: Yes    Alcohol intake: Occasional       Current Medications  Current Outpatient Medications   Medication Sig Dispense Refill    Bioflavonoid Products (STEPHANY C PO) Take 1 tablet by mouth daily       No current facility-administered medications for this visit  Allergies  Allergies   Allergen Reactions    Tylenol [Acetaminophen]      RINGING IN EARS       Past Medical History, Social History, Family History, medications and allergies were reviewed  Vitals  Vitals:    20 1558   BP: 122/88   Temp: 98 °F (36 7 °C)   Weight: 90 7 kg (200 lb)   Height: 6' (1 829 m)       Physical Exam  Physical Exam    On examination he is in no acute distress  Gait normal   Affect normal    Results  No results found for: PSA  No results found for: GLUCOSE, CALCIUM, NA, K, CO2, CL, BUN, CREATININE  No results found for: WBC, HGB, HCT, MCV, PLT      Office Urine Dip  No results found for this or any previous visit (from the past 1 hour(s))  ]      Total visit time was 40 minutes of which over 50% was spent on counseling

## 2020-09-29 NOTE — PROGRESS NOTES
9/29/2020    Mayelin Wheeler  1955  [id number]        Assessment  M[place] 4+3=7/10 prostate cancer      Discussion    I had a lengthy discussion in the office with the patient today regarding his new diagnosis of [place] 4 + 3 disease  We discussed that 4 of 6 cores are positive on the left side of the prostate in up to 80% of the cores  We discussed that this intermediate to high risk prostate cancer requires treatment and that active surveillance is not an option  We discussed either external beam radiation therapy or robot assisted laparoscopic prostatectomy for definitive treatment of his prostate cancer  He is vehemently opposed to undergoing surgery at this time even though I explained to him that at only 72years of age in relatively good health that he may benefit overall in the long-term from surgery  He strongly wishes to consider external beam radiation therapy  In addition we discussed that if he were to have surgical extirpation of his prostate and to have a PSA recurrence thereafter that he could receive adjuvant radiation therapy  We discussed that if he receives radiation therapy upfront that surgery is typically not an option if he has disease recurrence  As part of staging I recommend a CT scan of the abdomen and pelvis  He will be referred to Radiation Oncology  He will return in the next 3 weeks to receive a Lupron injection per NCCN guidelines prior to radiation therapy as he has [place] 4 + 3 disease  He will then require IMRT marker insertion as well as Space Oar in the operating room  The patient is amenable with this plan  History of Present Illness  72 y o  male with a history of a PSA of 9 8 from May 2020  I 1st saw Sunny Lombardo in the office in July 2020  He did not wish to have the PSA repeated  He recently underwent transrectal ultrasound-guided biopsy of the prostate  This was performed in the operating room with sedation/anesthesia  He returns in follow-up today    He denies any post biopsy sequelae  We discussed and reviewed his pathology report at length in the office today  On the right side of the prostate there are no positive cores  On the left side of the prostate 4 of 6 cores obtained are positive for [place] 4 + 3 involving between 5 in 80% of the cores  [place] pattern 4 is identified in between 60 and 80% of the specimens  The patient states that his father had bladder cancer and underwent cystectomy with ileal conduit  He is extremely hesitant to proceed with any type of surgical intervention  I made it very clear to him that he would not have an ileal conduit if he underwent robot assisted laparoscopic prostatectomy  AUA Symptom Score      Review of Systems  Review of Systems   Constitutional: Negative  HENT: Negative  Eyes: Negative  Respiratory: Negative  Cardiovascular: Negative  Gastrointestinal: Negative  Endocrine: Negative  Genitourinary:        Per HPI   Musculoskeletal: Negative  Skin: Negative  Allergic/Immunologic: Negative  Neurological: Negative  Hematological: Negative  Psychiatric/Behavioral: Negative  Past Medical History  Past Medical History:   Diagnosis Date    Anal fissure        Past Social History  Past Surgical History:   Procedure Laterality Date    [facility]     HERNIA REPAIR      inguinal    GA BIOPSY OF PROSTATE,NEEDLE/PUNCH N/A 9/11/2020    Procedure: TRANSRECTAL NEEDLE BIOPSY PROSTATE (TRNBP);   Surgeon: Elio Frank MD;  [facility];  Service: Urology    SPERMATOCELECTOMY  02/2018    right spermatocelectomy and multiple lipoma removal    US GUIDED PROSTATE BIOPSY  9/11/2020       Past Family History  Family History   Family history unknown: Yes       Past Social history  Social History     Socioeconomic History    Marital status: Single     Spouse name: Not on file    Number of children: Not on file    Years of education: Not on file    Highest education level: Not on file   Occupational History    Occupation: self employed   Social Needs    Financial resource strain: Not on file    Food insecurity     Worry: Not on file     Inability: Not on file   [employer] needs     Medical: Not on file     Non-medical: Not on file   Tobacco Use    Smoking status: Former Smoker     Packs/day: 1 00     Years: 3 00     Pack years: 3 00     Last attempt to quit: 1973     Years since quittin 7    Smokeless tobacco: Never Used   Substance and Sexual Activity    Alcohol use: Yes     Alcohol/week: 10 0 standard drinks     Types: 10 Cans of beer per week     Frequency: 2-4 times a month     Drinks per session: 10 or more     Binge frequency: Weekly     Comment: Weekends    Drug use: Yes     Frequency: 2 0 times per week     Types: Marijuana     Comment: Weekends    Sexual activity: Yes     Partners: Female   Lifestyle    Physical activity     Days per week: Not on file     Minutes per session: Not on file    Stress: Not on file   Relationships    Social connections     Talks on phone: Not on file     Gets together: Not on file     Attends Christianity service: Not on file     Active member of club or organization: Not on file     Attends meetings of clubs or organizations: Not on file     Relationship status: Not on file    Intimate partner violence     Fear of current or ex partner: Not on file     Emotionally abused: Not on file     Physically abused: Not on file     Forced sexual activity: Not on file   Other Topics Concern    Not on file   Social History Narrative    Most recent tobacco use screenin2018    Live alone or with others: with others    Marital status: Single    Occupation: self employed    Are you currently employed:  Yes    Alcohol intake: Occasional       Current Medications  Current Outpatient Medications   Medication Sig Dispense Refill    Bioflavonoid Products (STEPHANY C PO) Take 1 tablet by mouth daily       No current facility-administered medications for this visit  Allergies  Allergies   Allergen Reactions    Tylenol [Acetaminophen]      RINGING IN EARS       Past Medical History, Social History, Family History, medications and allergies were reviewed  Vitals  Vitals:    09/29/20 1558   BP: 122/88   Temp: 98 °F (36 7 °C)   Weight: 90 7 kg (200 lb)   Height: 6' (1 829 m)       Physical Exam  Physical Exam    On examination he is in no acute distress  Gait normal   Affect normal    Results  No results found for: PSA  No results found for: GLUCOSE, CALCIUM, NA, K, CO2, CL, BUN, CREATININE  No results found for: WBC, HGB, HCT, MCV, PLT      Office Urine Dip  No results found for this or any previous visit (from the past 1 hour(s))  ]      Total visit time was 40 minutes of which over 50% was spent on counseling

## 2020-09-30 ENCOUNTER — PATIENT OUTREACH (OUTPATIENT)
Dept: UROLOGY | Facility: AMBULATORY SURGERY CENTER | Age: 65
End: 2020-09-30

## 2020-09-30 DIAGNOSIS — C61 PROSTATE CANCER (HCC): Primary | ICD-10-CM

## 2020-09-30 NOTE — PROGRESS NOTES
Called Ronny RAMACHANDRAN at [phone]  Made him aware that he will need blood work prior to his CT scan  Left my direct extension and requested a call back if he has questions  Also left same message on home phone [phone]

## 2020-10-01 NOTE — PROGRESS NOTES
Ronny called  He is aware of the order for blood work prior to CT  Reviewed all of his pending appointments

## 2020-10-01 NOTE — PROGRESS NOTES
He had BM 5/20 that was normal   He is petrified of blood work and passes out  I am not sure if he needs another BMP  Thank you    FT

## 2020-10-05 ENCOUNTER — PATIENT OUTREACH (OUTPATIENT)
Dept: UROLOGY | Facility: AMBULATORY SURGERY CENTER | Age: 65
End: 2020-10-05

## 2020-10-05 ENCOUNTER — APPOINTMENT (OUTPATIENT)
Dept: LAB | Facility: HOSPITAL | Age: 65
End: 2020-10-05
Payer: COMMERCIAL

## 2020-10-05 ENCOUNTER — TELEPHONE (OUTPATIENT)
Dept: UROLOGY | Facility: AMBULATORY SURGERY CENTER | Age: 65
End: 2020-10-05

## 2020-10-05 DIAGNOSIS — C61 PROSTATE CANCER (HCC): ICD-10-CM

## 2020-10-05 DIAGNOSIS — R39.89 SUSPECTED UTI: ICD-10-CM

## 2020-10-05 DIAGNOSIS — R97.20 ELEVATED PSA: ICD-10-CM

## 2020-10-05 DIAGNOSIS — Z01.810 PRE-OPERATIVE CARDIOVASCULAR EXAMINATION: ICD-10-CM

## 2020-10-05 LAB
ANION GAP SERPL CALCULATED.3IONS-SCNC: 5 MMOL/L (ref 4–13)
BUN SERPL-MCNC: 18 MG/DL (ref 6–20)
CALCIUM SERPL-MCNC: 9.3 MG/DL (ref 8.4–10.2)
CHLORIDE SERPL-SCNC: 105 MMOL/L (ref 96–108)
CO2 SERPL-SCNC: 29 MMOL/L (ref 22–33)
CREAT SERPL-MCNC: 0.91 MG/DL (ref 0.5–1.2)
GFR SERPL CREATININE-BSD FRML MDRD: 88 ML/MIN/1.73SQ M
GLUCOSE P FAST SERPL-MCNC: 93 MG/DL (ref 70–100)
POTASSIUM SERPL-SCNC: 4.6 MMOL/L (ref 3.5–5)
SODIUM SERPL-SCNC: 139 MMOL/L (ref 133–145)

## 2020-10-05 PROCEDURE — 80048 BASIC METABOLIC PNL TOTAL CA: CPT

## 2020-10-05 PROCEDURE — 36415 COLL VENOUS BLD VENIPUNCTURE: CPT

## 2020-10-06 ENCOUNTER — HOSPITAL ENCOUNTER (OUTPATIENT)
Dept: CT IMAGING | Facility: HOSPITAL | Age: 65
Discharge: HOME | End: 2020-10-06
Payer: COMMERCIAL

## 2020-10-06 DIAGNOSIS — C61 PROSTATE CANCER (HCC): ICD-10-CM

## 2020-10-06 PROCEDURE — G1004 CDSM NDSC: HCPCS

## 2020-10-06 PROCEDURE — 74177 CT ABD & PELVIS W/CONTRAST: CPT

## 2020-10-06 RX ADMIN — IOHEXOL 100 ML: 350 INJECTION, SOLUTION INTRAVENOUS at 17:22

## 2020-10-13 ENCOUNTER — TRANSCRIBE ORDERS (OUTPATIENT)
Dept: ADMINISTRATIVE | Facility: HOSPITAL | Age: 65
End: 2020-10-13

## 2020-10-13 ENCOUNTER — LAB (OUTPATIENT)
Dept: LAB | Facility: HOSPITAL | Age: 65
End: 2020-10-13
Payer: COMMERCIAL

## 2020-10-13 DIAGNOSIS — C61 MALIGNANT NEOPLASM OF PROSTATE (HCC): Primary | ICD-10-CM

## 2020-10-13 DIAGNOSIS — C61 MALIGNANT NEOPLASM OF PROSTATE (HCC): ICD-10-CM

## 2020-10-13 LAB — PSA SERPL-MCNC: 7.9 NG/ML (ref 0–4)

## 2020-10-13 PROCEDURE — G0103 PSA SCREENING: HCPCS

## 2020-10-13 PROCEDURE — 36415 COLL VENOUS BLD VENIPUNCTURE: CPT

## 2020-10-13 PROCEDURE — 87086 URINE CULTURE/COLONY COUNT: CPT

## 2020-10-14 ENCOUNTER — TELEPHONE (OUTPATIENT)
Dept: UROLOGY | Facility: AMBULATORY SURGERY CENTER | Age: 65
End: 2020-10-14

## 2020-10-14 ENCOUNTER — PATIENT OUTREACH (OUTPATIENT)
Dept: UROLOGY | Facility: AMBULATORY SURGERY CENTER | Age: 65
End: 2020-10-14

## 2020-10-14 DIAGNOSIS — C61 PROSTATE CANCER (HCC): Primary | ICD-10-CM

## 2020-10-14 LAB — BACTERIA UR CULT: NORMAL

## 2020-10-17 ENCOUNTER — HOSPITAL ENCOUNTER (OUTPATIENT)
Dept: RADIOLOGY | Facility: HOSPITAL | Age: 65
Discharge: HOME | End: 2020-10-17
Attending: UROLOGY
Payer: COMMERCIAL

## 2020-10-17 DIAGNOSIS — C61 PROSTATE CANCER (HCC): ICD-10-CM

## 2020-10-17 PROCEDURE — G1004 CDSM NDSC: HCPCS

## 2020-10-17 PROCEDURE — A9503 TC99M MEDRONATE: HCPCS

## 2020-10-17 PROCEDURE — 78306 BONE IMAGING WHOLE BODY: CPT

## 2020-10-20 ENCOUNTER — DOCUMENTATION (OUTPATIENT)
Dept: UROLOGY | Facility: AMBULATORY SURGERY CENTER | Age: 65
End: 2020-10-20

## 2020-10-21 ENCOUNTER — TELEPHONE (OUTPATIENT)
Dept: UROLOGY | Facility: AMBULATORY SURGERY CENTER | Age: 65
End: 2020-10-21

## 2020-10-21 ENCOUNTER — OFFICE VISIT (OUTPATIENT)
Dept: UROLOGY | Facility: AMBULATORY SURGERY CENTER | Age: 65
End: 2020-10-21
Payer: COMMERCIAL

## 2020-10-21 VITALS
HEART RATE: 62 BPM | WEIGHT: 200 LBS | TEMPERATURE: 97.5 F | DIASTOLIC BLOOD PRESSURE: 88 MMHG | SYSTOLIC BLOOD PRESSURE: 122 MMHG | BODY MASS INDEX: 27.09 KG/M2 | HEIGHT: 72 IN

## 2020-10-21 DIAGNOSIS — C61 PROSTATE CANCER (HCC): Primary | ICD-10-CM

## 2020-10-21 PROCEDURE — 99214 OFFICE O/P EST MOD 30 MIN: CPT | Performed by: UROLOGY

## 2020-10-21 RX ORDER — PANTOPRAZOLE SODIUM 40 MG/1
40 TABLET, DELAYED RELEASE ORAL DAILY
COMMUNITY
Start: 2020-09-24 | End: 2021-01-19 | Stop reason: SDUPTHER

## 2020-10-22 ENCOUNTER — PREP FOR PROCEDURE (OUTPATIENT)
Dept: OTHER | Facility: HOSPITAL | Age: 65
End: 2020-10-22

## 2020-10-22 DIAGNOSIS — C61 PROSTATE CANCER (HCC): Primary | ICD-10-CM

## 2020-10-22 RX ORDER — SODIUM CHLORIDE 9 MG/ML
125 INJECTION, SOLUTION INTRAVENOUS CONTINUOUS
Status: CANCELLED | OUTPATIENT
Start: 2020-10-22

## 2020-10-26 ENCOUNTER — PREP FOR PROCEDURE (OUTPATIENT)
Dept: UROLOGY | Facility: AMBULATORY SURGERY CENTER | Age: 65
End: 2020-10-26

## 2020-10-26 DIAGNOSIS — Z01.818 PREOP EXAMINATION: ICD-10-CM

## 2020-10-26 DIAGNOSIS — Z51.81 MONITORING FOR LONG-TERM ANTICOAGULANT USE: ICD-10-CM

## 2020-10-26 DIAGNOSIS — Z79.01 MONITORING FOR LONG-TERM ANTICOAGULANT USE: ICD-10-CM

## 2020-10-26 DIAGNOSIS — Z11.59 SCREENING FOR VIRAL DISEASE: ICD-10-CM

## 2020-10-26 DIAGNOSIS — C61 PROSTATE CANCER (HCC): Primary | ICD-10-CM

## 2020-11-02 ENCOUNTER — TELEPHONE (OUTPATIENT)
Dept: UROLOGY | Facility: AMBULATORY SURGERY CENTER | Age: 65
End: 2020-11-02

## 2020-11-02 NOTE — PRE-PROCEDURE INSTRUCTIONS
Pre-Surgery Instructions:   Medication Instructions    Bioflavonoid Products (STEPHANY C PO) Instructed patient per Anesthesia Guidelines   pantoprazole (PROTONIX) 40 mg tablet Instructed patient per Anesthesia Guidelines  Have you had / have a sore throat? No  have you had / have a cough less than 1 week? No  Have you had / have a fever greater than 100 0 - 100  4? No  Are you experiencing any shortness of breath? No    Review with patient via phone medications and showering instructions  Advice stop vitamins and don't  take NSAID'S week prior day of surgery  Advice need pre op  Testing  Review Prostate class IS,Villagran catheter care,etc  Advice ASC call with surgery schedule time  Verbalized understanding  Vitamin Med Class   You may continue to take any vitamin that your surgeon has prescribed to you up to the day before surgery   If your surgeon has not specifically prescribed this vitamin or instructed you to continue then stop taking 7 days prior to surgery

## 2020-11-10 ENCOUNTER — OFFICE VISIT (OUTPATIENT)
Dept: UROLOGY | Facility: AMBULATORY SURGERY CENTER | Age: 65
End: 2020-11-10
Payer: COMMERCIAL

## 2020-11-10 VITALS
SYSTOLIC BLOOD PRESSURE: 142 MMHG | HEART RATE: 74 BPM | DIASTOLIC BLOOD PRESSURE: 92 MMHG | HEIGHT: 72 IN | TEMPERATURE: 97.3 F | BODY MASS INDEX: 26.82 KG/M2 | WEIGHT: 198 LBS

## 2020-11-10 DIAGNOSIS — C61 PROSTATE CANCER (HCC): Primary | ICD-10-CM

## 2020-11-10 PROCEDURE — 99215 OFFICE O/P EST HI 40 MIN: CPT | Performed by: UROLOGY

## 2021-01-12 ENCOUNTER — OFFICE VISIT (OUTPATIENT)
Dept: UROLOGY | Facility: AMBULATORY SURGERY CENTER | Age: 66
End: 2021-01-12
Payer: COMMERCIAL

## 2021-01-12 VITALS
SYSTOLIC BLOOD PRESSURE: 132 MMHG | BODY MASS INDEX: 26.82 KG/M2 | WEIGHT: 198 LBS | DIASTOLIC BLOOD PRESSURE: 78 MMHG | HEIGHT: 72 IN | HEART RATE: 82 BPM

## 2021-01-12 DIAGNOSIS — C61 PROSTATE CANCER (HCC): Primary | ICD-10-CM

## 2021-01-12 PROCEDURE — 99214 OFFICE O/P EST MOD 30 MIN: CPT | Performed by: UROLOGY

## 2021-01-12 NOTE — H&P (VIEW-ONLY)
1/12/2021    Eladio Prasad  1955  [id number]        Assessment  [place] 4+3=7/10 prostate cancer      Discussion  Enmanuel Valerio is scheduled to undergo robot assisted laparoscopic prostatectomy with bilateral pelvic lymph node dissection  on January 20, 2021  Risk and benefits of the procedure were discussed and reviewed at length including, but not limited to, incontinence, erectile dysfunction, and disease recurrence  Informed consent was obtained  History of Present Illness  72 y o  male with a history of an elevated PSA  His PSA in early 2020 was noted to be just below 10  This prompted a transrectal ultrasound-guided biopsy of the prostate which was ultimately performed in September 2020 is the patient required sedation/anesthesia in the operating room  Pathology revealed 4 of 12 cores positive with [place] 4 + 3 disease involving between 5 in 90% of the core  All cancer was identified on the left side of the prostate  The patient has moderate erectile dysfunction  He denies any lower urinary tract symptoms  He was previously scheduled for surgery but canceled the date  For preoperative planning he had a CT scan which showed a suspicious lesion in the bone of the pelvis  A follow-up bone scan was negative for metastatic disease  AUA Symptom Score      Review of Systems  Review of Systems   Constitutional: Negative  HENT: Negative  Eyes: Negative  Respiratory: Negative  Cardiovascular: Negative  Gastrointestinal: Negative  Endocrine: Negative  Genitourinary:        Per HPI   Musculoskeletal: Negative  Skin: Negative  Allergic/Immunologic: Negative  Neurological: Negative  Hematological: Negative  Psychiatric/Behavioral: Negative            Past Medical History  Past Medical History:   Diagnosis Date    Anal fissure     Colon polyp     GERD (gastroesophageal reflux disease)        Past Social History  Past Surgical History:   Procedure Laterality Date    [facility]     EGD      HERNIA REPAIR      inguinal    ND BIOPSY OF PROSTATE,NEEDLE/PUNCH N/A 2020    Procedure: TRANSRECTAL NEEDLE BIOPSY PROSTATE (TRNBP); Surgeon: Jose York MD;  Location:  MAIN OR;  Service: Urology    SPERMATOCELECTOMY  2018    right spermatocelectomy and multiple lipoma removal    US GUIDED PROSTATE BIOPSY  2020       Past Family History  Family History   Family history unknown: Yes       Past Social history  Social History     Socioeconomic History    Marital status: Single     Spouse name: Not on file    Number of children: Not on file    Years of education: Not on file    Highest education level: Not on file   Occupational History    Occupation: self employed   Social Needs    Financial resource strain: Not on file    Food insecurity     Worry: Not on file     Inability: Not on file   [employer] needs     Medical: Not on file     Non-medical: Not on file   Tobacco Use    Smoking status: Former Smoker     Packs/day: 1 00     Years: 3 00     Pack years: 3 00     Quit date:      Years since quittin 0    Smokeless tobacco: Never Used   Substance and Sexual Activity    Alcohol use:  Yes     Alcohol/week: 10 0 standard drinks     Types: 10 Cans of beer per week     Frequency: 2-4 times a month     Drinks per session: 10 or more     Binge frequency: Weekly     Comment: Weekends    Drug use: Yes     Frequency: 2 0 times per week     Types: Marijuana     Comment: Weekends    Sexual activity: Yes     Partners: Female   Lifestyle    Physical activity     Days per week: Not on file     Minutes per session: Not on file    Stress: Not on file   Relationships    Social connections     Talks on phone: Not on file     Gets together: Not on file     Attends Ep[employer]opalian service: Not on file     Active member of club or organization: Not on file     Attends meetings of clubs or organizations: Not on file     Relationship status: Not on file    Intimate partner violence     Fear of current or ex partner: Not on file     Emotionally abused: Not on file     Physically abused: Not on file     Forced sexual activity: Not on file   Other Topics Concern    Not on file   Social History Narrative    Most recent tobacco use screenin2018    Live alone or with others: with others    Marital status: Single    Occupation: self employed    Are you currently employed: Yes    Alcohol intake: Occasional       Current Medications  Current Outpatient Medications   Medication Sig Dispense Refill    Bioflavonoid Products (STEPHANY C PO) Take 1 tablet by mouth daily      pantoprazole (PROTONIX) 40 mg tablet Take 40 mg by mouth daily       No current facility-administered medications for this visit  Allergies  Allergies   Allergen Reactions    Other      DUST    Tylenol [Acetaminophen]      RINGING IN EARS       Past Medical History, Social History, Family History, medications and allergies were reviewed  Vitals  Vitals:    21 0854   BP: 132/78   Pulse: 82   Weight: 89 8 kg (198 lb)   Height: 6' (1 829 m)       Physical Exam  Physical Exam  On examination he is in no acute distress  Lungs are clear  Cardiac is regular  Abdomen is soft nontender nondistended  A right inguinal hernia scars noted  Skin is warm  Extremities without edema  Neurologic is grossly intact and nonfocal   Gait normal   Affect normal      Results  Lab Results   Component Value Date    PSA 7 9 (H) 10/13/2020     Lab Results   Component Value Date    CALCIUM 9 3 10/05/2020    K 4 6 10/05/2020    CO2 29 10/05/2020     10/05/2020    BUN 18 10/05/2020    CREATININE 0 91 10/05/2020     No results found for: WBC, HGB, HCT, MCV, PLT      Office Urine Dip  No results found for this or any previous visit (from the past 1 hour(s))  ]      Total visit time was 25 minutes of which over 50% was spent on counseling

## 2021-01-12 NOTE — PROGRESS NOTES
1/12/2021    Kaylee Meyers  1955  [id number]        Assessment  [place] 4+3=7/10 prostate cancer      Discussion  Srikanth Cantu is scheduled to undergo robot assisted laparoscopic prostatectomy with bilateral pelvic lymph node dissection  on January 20, 2021  Risk and benefits of the procedure were discussed and reviewed at length including, but not limited to, incontinence, erectile dysfunction, and disease recurrence  Informed consent was obtained  History of Present Illness  72 y o  male with a history of an elevated PSA  His PSA in early 2020 was noted to be just below 10  This prompted a transrectal ultrasound-guided biopsy of the prostate which was ultimately performed in September 2020 is the patient required sedation/anesthesia in the operating room  Pathology revealed 4 of 12 cores positive with [place] 4 + 3 disease involving between 5 in 90% of the core  All cancer was identified on the left side of the prostate  The patient has moderate erectile dysfunction  He denies any lower urinary tract symptoms  He was previously scheduled for surgery but canceled the date  For preoperative planning he had a CT scan which showed a suspicious lesion in the bone of the pelvis  A follow-up bone scan was negative for metastatic disease  AUA Symptom Score      Review of Systems  Review of Systems   Constitutional: Negative  HENT: Negative  Eyes: Negative  Respiratory: Negative  Cardiovascular: Negative  Gastrointestinal: Negative  Endocrine: Negative  Genitourinary:        Per HPI   Musculoskeletal: Negative  Skin: Negative  Allergic/Immunologic: Negative  Neurological: Negative  Hematological: Negative  Psychiatric/Behavioral: Negative            Past Medical History  Past Medical History:   Diagnosis Date    Anal fissure     Colon polyp     GERD (gastroesophageal reflux disease)        Past Social History  Past Surgical History:   Procedure Laterality Date    COL[facility]COPY  2020    [facility]     EGD      HERNIA REPAIR      inguinal    MS BIOPSY OF PROSTATE,NEEDLE/PUNCH N/A 2020    Procedure: TRANSRECTAL NEEDLE BIOPSY PROSTATE (TRNBP); Surgeon: Chuck Cifuentes MD;  Location:  MAIN OR;  Service: Urology    SPERMATOCELECTOMY  2018    right spermatocelectomy and multiple lipoma removal    US GUIDED PROSTATE BIOPSY  2020       Past Family History  Family History   Family history unknown: Yes       Past Social history  Social History     Socioeconomic History    Marital status: Single     Spouse name: Not on file    Number of children: Not on file    Years of education: Not on file    Highest education level: Not on file   Occupational History    Occupation: self employed   Social Needs    Financial resource strain: Not on file    Food insecurity     Worry: Not on file     Inability: Not on file   [employer] needs     Medical: Not on file     Non-medical: Not on file   Tobacco Use    Smoking status: Former Smoker     Packs/day: 1 00     Years: 3 00     Pack years: 3 00     Quit date:      Years since quittin 0    Smokeless tobacco: Never Used   Substance and Sexual Activity    Alcohol use:  Yes     Alcohol/week: 10 0 standard drinks     Types: 10 Cans of beer per week     Frequency: 2-4 times a month     Drinks per session: 10 or more     Binge frequency: Weekly     Comment: Weekends    Drug use: Yes     Frequency: 2 0 times per week     Types: Marijuana     Comment: Weekends    Sexual activity: Yes     Partners: Female   Lifestyle    Physical activity     Days per week: Not on file     Minutes per session: Not on file    Stress: Not on file   Relationships    Social connections     Talks on phone: Not on file     Gets together: Not on file     Attends Christianity service: Not on file     Active member of club or organization: Not on file     Attends meetings of clubs or organizations: Not on file     Relationship status: Not on file    Intimate partner violence     Fear of current or ex partner: Not on file     Emotionally abused: Not on file     Physically abused: Not on file     Forced sexual activity: Not on file   Other Topics Concern    Not on file   Social History Narrative    Most recent tobacco use screenin2018    Live alone or with others: with others    Marital status: Single    Occupation: self employed    Are you currently employed: Yes    Alcohol intake: Occasional       Current Medications  Current Outpatient Medications   Medication Sig Dispense Refill    Bioflavonoid Products (STEPHANY C PO) Take 1 tablet by mouth daily      pantoprazole (PROTONIX) 40 mg tablet Take 40 mg by mouth daily       No current facility-administered medications for this visit  Allergies  Allergies   Allergen Reactions    Other      DUST    Tylenol [Acetaminophen]      RINGING IN EARS       Past Medical History, Social History, Family History, medications and allergies were reviewed  Vitals  Vitals:    21 0854   BP: 132/78   Pulse: 82   Weight: 89 8 kg (198 lb)   Height: 6' (1 829 m)       Physical Exam  Physical Exam  On examination he is in no acute distress  Lungs are clear  Cardiac is regular  Abdomen is soft nontender nondistended  A right inguinal hernia scars noted  Skin is warm  Extremities without edema  Neurologic is grossly intact and nonfocal   Gait normal   Affect normal      Results  Lab Results   Component Value Date    PSA 7 9 (H) 10/13/2020     Lab Results   Component Value Date    CALCIUM 9 3 10/05/2020    K 4 6 10/05/2020    CO2 29 10/05/2020     10/05/2020    BUN 18 10/05/2020    CREATININE 0 91 10/05/2020     No results found for: WBC, HGB, HCT, MCV, PLT      Office Urine Dip  No results found for this or any previous visit (from the past 1 hour(s))  ]      Total visit time was 25 minutes of which over 50% was spent on counseling

## 2021-01-14 ENCOUNTER — LAB (OUTPATIENT)
Dept: LAB | Facility: HOSPITAL | Age: 66
End: 2021-01-14
Payer: COMMERCIAL

## 2021-01-14 ENCOUNTER — TRANSCRIBE ORDERS (OUTPATIENT)
Dept: ADMINISTRATIVE | Facility: HOSPITAL | Age: 66
End: 2021-01-14

## 2021-01-14 ENCOUNTER — LAB REQUISITION (OUTPATIENT)
Dept: LAB | Facility: HOSPITAL | Age: 66
End: 2021-01-14
Payer: COMMERCIAL

## 2021-01-14 ENCOUNTER — ANESTHESIA EVENT (OUTPATIENT)
Dept: PERIOP | Facility: HOSPITAL | Age: 66
End: 2021-01-14
Payer: COMMERCIAL

## 2021-01-14 DIAGNOSIS — C61 PROSTATE CANCER (HCC): ICD-10-CM

## 2021-01-14 DIAGNOSIS — Z01.818 ENCOUNTER FOR OTHER PREPROCEDURAL EXAMINATION: ICD-10-CM

## 2021-01-14 DIAGNOSIS — Z51.81 MONITORING FOR LONG-TERM ANTICOAGULANT USE: ICD-10-CM

## 2021-01-14 DIAGNOSIS — Z79.01 MONITORING FOR LONG-TERM ANTICOAGULANT USE: ICD-10-CM

## 2021-01-14 DIAGNOSIS — Z01.818 OTHER SPECIFIED PRE-OPERATIVE EXAMINATION: ICD-10-CM

## 2021-01-14 DIAGNOSIS — Z01.818 PREOP EXAMINATION: ICD-10-CM

## 2021-01-14 DIAGNOSIS — Z11.59 SCREENING FOR VIRAL DISEASE: ICD-10-CM

## 2021-01-14 DIAGNOSIS — Z01.818 OTHER SPECIFIED PRE-OPERATIVE EXAMINATION: Primary | ICD-10-CM

## 2021-01-14 LAB
ABO GROUP BLD: NORMAL
APTT PPP: 27 SECONDS (ref 23–31)
BASOPHILS # BLD AUTO: 0.04 THOUSANDS/ΜL (ref 0–0.1)
BASOPHILS NFR BLD AUTO: 1 % (ref 0–1)
BLD GP AB SCN SERPL QL: NEGATIVE
EOSINOPHIL # BLD AUTO: 0.16 THOUSAND/ΜL (ref 0–0.61)
EOSINOPHIL NFR BLD AUTO: 2 % (ref 0–6)
ERYTHROCYTE [DISTWIDTH] IN BLOOD BY AUTOMATED COUNT: 13.5 % (ref 11.6–15.1)
HCT VFR BLD AUTO: 47.5 % (ref 36.5–49.3)
HGB BLD-MCNC: 16.1 G/DL (ref 12–17)
IMM GRANULOCYTES # BLD AUTO: 0.02 THOUSAND/UL (ref 0–0.2)
IMM GRANULOCYTES NFR BLD AUTO: 0 % (ref 0–2)
INR PPP: 0.92 (ref 0.9–1.1)
LYMPHOCYTES # BLD AUTO: 1.98 THOUSANDS/ΜL (ref 0.6–4.47)
LYMPHOCYTES NFR BLD AUTO: 25 % (ref 14–44)
MCH RBC QN AUTO: 29.3 PG (ref 26.8–34.3)
MCHC RBC AUTO-ENTMCNC: 33.9 G/DL (ref 31.4–37.4)
MCV RBC AUTO: 86 FL (ref 82–98)
MONOCYTES # BLD AUTO: 0.77 THOUSAND/ΜL (ref 0.17–1.22)
MONOCYTES NFR BLD AUTO: 10 % (ref 4–12)
NEUTROPHILS # BLD AUTO: 4.85 THOUSANDS/ΜL (ref 1.85–7.62)
NEUTS SEG NFR BLD AUTO: 62 % (ref 43–75)
PLATELET # BLD AUTO: 243 THOUSANDS/UL (ref 149–390)
PMV BLD AUTO: 10.5 FL (ref 8.9–12.7)
PROTHROMBIN TIME: 10.4 SECONDS (ref 9.5–12.1)
RBC # BLD AUTO: 5.5 MILLION/UL (ref 3.88–5.62)
RH BLD: NEGATIVE
SPECIMEN EXPIRATION DATE: NORMAL
WBC # BLD AUTO: 7.82 THOUSAND/UL (ref 4.31–10.16)

## 2021-01-14 PROCEDURE — 86901 BLOOD TYPING SEROLOGIC RH(D): CPT | Performed by: UROLOGY

## 2021-01-14 PROCEDURE — 86900 BLOOD TYPING SEROLOGIC ABO: CPT | Performed by: UROLOGY

## 2021-01-14 PROCEDURE — 85730 THROMBOPLASTIN TIME PARTIAL: CPT

## 2021-01-14 PROCEDURE — 36415 COLL VENOUS BLD VENIPUNCTURE: CPT

## 2021-01-14 PROCEDURE — U0003 INFECTIOUS AGENT DETECTION BY NUCLEIC ACID (DNA OR RNA); SEVERE ACUTE RESPIRATORY SYNDROME CORONAVIRUS 2 (SARS-COV-2) (CORONAVIRUS DISEASE [COVID-19]), AMPLIFIED PROBE TECHNIQUE, MAKING USE OF HIGH THROUGHPUT TECHNOLOGIES AS DESCRIBED BY CMS-2020-01-R: HCPCS | Performed by: UROLOGY

## 2021-01-14 PROCEDURE — U0005 INFEC AGEN DETEC AMPLI PROBE: HCPCS | Performed by: UROLOGY

## 2021-01-14 PROCEDURE — 86850 RBC ANTIBODY SCREEN: CPT | Performed by: UROLOGY

## 2021-01-14 PROCEDURE — 85025 COMPLETE CBC W/AUTO DIFF WBC: CPT

## 2021-01-14 PROCEDURE — 85610 PROTHROMBIN TIME: CPT

## 2021-01-14 NOTE — PRE-PROCEDURE INSTRUCTIONS
Pre-Surgery Instructions:   Medication Instructions    Bioflavonoid Products (STEPHANY C PO) Instructed patient per Anesthesia Guidelines   pantoprazole (PROTONIX) 40 mg tablet Instructed patient per Anesthesia Guidelines  Covid screening negative  Patient denies any S/S  Reviewed with patient via phone all medications and showering instructions  Advised not to take vitamins, herbal products or NSAID's for one week prior to DOS  Prostate class reviewed, all questions and concerns addressed  Informed about phone call from Waylon Ang with time of scheduled surgery  Pt verbalizes understanding  Vitamin Med Class  You may continue to take any vitamin that your surgeon has prescribed to you up to the day before surgery   If your surgeon has not specifically prescribed this vitamin or instructed you to continue then stop taking 7 days prior to surgery

## 2021-01-15 LAB — SARS-COV-2 RNA SPEC QL NAA+PROBE: NOT DETECTED

## 2021-01-19 ENCOUNTER — TELEPHONE (OUTPATIENT)
Dept: UROLOGY | Facility: AMBULATORY SURGERY CENTER | Age: 66
End: 2021-01-19

## 2021-01-19 DIAGNOSIS — K21.00 GASTROESOPHAGEAL REFLUX DISEASE WITH ESOPHAGITIS, UNSPECIFIED WHETHER HEMORRHAGE: Primary | ICD-10-CM

## 2021-01-19 RX ORDER — PANTOPRAZOLE SODIUM 40 MG/1
40 TABLET, DELAYED RELEASE ORAL DAILY
Qty: 30 TABLET | Refills: 3 | Status: SHIPPED | OUTPATIENT
Start: 2021-01-19 | End: 2021-04-13

## 2021-01-19 NOTE — TELEPHONE ENCOUNTER
Patient of [facility]/[place] office  Scheduled for PROSTATECTOMY RADICAL LAPAROSCOPIC W ROBOTICS 1/20/2021  Call returned to patient to discuss his questions  Left message to call office back  Office number provided on [employer]

## 2021-01-19 NOTE — TELEPHONE ENCOUNTER
Patient returned call  Reviewed at length with him all of the prep for his surgery tomorrow  Answered his questions regarding his pre op testing and general questions regarding his hospital stay  Patient verbalized understanding of plan and thanked me for the call

## 2021-01-19 NOTE — TELEPHONE ENCOUNTER
Patient's Pharmacy is requesting a refill on her pantoprazole please send to pharmacy   Thanks Zoraida Wallace

## 2021-01-19 NOTE — TELEPHONE ENCOUNTER
Patient managed by Bentley Course is having surgery tomorrow has questions regarding doing prep prior to surgery  He is asking if someone would call him back as soon as possible

## 2021-01-20 ENCOUNTER — HOSPITAL ENCOUNTER (OUTPATIENT)
Facility: HOSPITAL | Age: 66
Setting detail: OUTPATIENT SURGERY
Discharge: HOME | End: 2021-01-21
Attending: UROLOGY | Admitting: UROLOGY
Payer: COMMERCIAL

## 2021-01-20 ENCOUNTER — ANESTHESIA (OUTPATIENT)
Dept: PERIOP | Facility: HOSPITAL | Age: 66
End: 2021-01-20
Payer: COMMERCIAL

## 2021-01-20 VITALS — HEART RATE: 84 BPM

## 2021-01-20 DIAGNOSIS — C61 PROSTATE CANCER (HCC): ICD-10-CM

## 2021-01-20 PROBLEM — K21.9 GASTROESOPHAGEAL REFLUX DISEASE: Chronic | Status: ACTIVE | Noted: 2021-01-20

## 2021-01-20 LAB
ABO GROUP BLD: NORMAL
ANION GAP SERPL CALCULATED.3IONS-SCNC: 6 MMOL/L (ref 4–13)
BUN SERPL-MCNC: 20 MG/DL (ref 5–25)
CALCIUM SERPL-MCNC: 8.7 MG/DL (ref 8.3–10.1)
CHLORIDE SERPL-SCNC: 111 MMOL/L (ref 100–108)
CO2 SERPL-SCNC: 24 MMOL/L (ref 21–32)
CREAT SERPL-MCNC: 1.33 MG/DL (ref 0.6–1.3)
ERYTHROCYTE [DISTWIDTH] IN BLOOD BY AUTOMATED COUNT: 13.2 % (ref 11.6–15.1)
GFR SERPL CREATININE-BSD FRML MDRD: 56 ML/MIN/1.73SQ M
GLUCOSE P FAST SERPL-MCNC: 141 MG/DL (ref 65–99)
GLUCOSE SERPL-MCNC: 141 MG/DL (ref 65–140)
HCT VFR BLD AUTO: 44.2 % (ref 36.5–49.3)
HGB BLD-MCNC: 14.1 G/DL (ref 12–17)
MCH RBC QN AUTO: 29.3 PG (ref 26.8–34.3)
MCHC RBC AUTO-ENTMCNC: 31.9 G/DL (ref 31.4–37.4)
MCV RBC AUTO: 92 FL (ref 82–98)
PLATELET # BLD AUTO: 232 THOUSANDS/UL (ref 149–390)
PMV BLD AUTO: 10.2 FL (ref 8.9–12.7)
POTASSIUM SERPL-SCNC: 4.6 MMOL/L (ref 3.5–5.3)
RBC # BLD AUTO: 4.81 MILLION/UL (ref 3.88–5.62)
RH BLD: NEGATIVE
SODIUM SERPL-SCNC: 141 MMOL/L (ref 136–145)
WBC # BLD AUTO: 16.72 THOUSAND/UL (ref 4.31–10.16)

## 2021-01-20 PROCEDURE — 80048 BASIC METABOLIC PNL TOTAL CA: CPT | Performed by: UROLOGY

## 2021-01-20 PROCEDURE — 88305 TISSUE EXAM BY PATHOLOGIST: CPT | Performed by: PATHOLOGY

## 2021-01-20 PROCEDURE — 88309 TISSUE EXAM BY PATHOLOGIST: CPT | Performed by: PATHOLOGY

## 2021-01-20 PROCEDURE — 85027 COMPLETE CBC AUTOMATED: CPT | Performed by: UROLOGY

## 2021-01-20 PROCEDURE — 88307 TISSUE EXAM BY PATHOLOGIST: CPT | Performed by: PATHOLOGY

## 2021-01-20 PROCEDURE — 88331 PATH CONSLTJ SURG 1 BLK 1SPC: CPT | Performed by: PATHOLOGY

## 2021-01-20 PROCEDURE — 88344 IMHCHEM/IMCYTCHM EA MLT ANTB: CPT | Performed by: PATHOLOGY

## 2021-01-20 PROCEDURE — 88342 IMHCHEM/IMCYTCHM 1ST ANTB: CPT | Performed by: PATHOLOGY

## 2021-01-20 PROCEDURE — 86920 COMPATIBILITY TEST SPIN: CPT

## 2021-01-20 PROCEDURE — NC001 PR NO CHARGE: Performed by: PHYSICIAN ASSISTANT

## 2021-01-20 PROCEDURE — 55866 LAPS SURG PRST8ECT RPBIC RAD: CPT | Performed by: PHYSICIAN ASSISTANT

## 2021-01-20 PROCEDURE — 38571 LAPAROSCOPY LYMPHADENECTOMY: CPT | Performed by: PHYSICIAN ASSISTANT

## 2021-01-20 PROCEDURE — 38571 LAPAROSCOPY LYMPHADENECTOMY: CPT | Performed by: UROLOGY

## 2021-01-20 PROCEDURE — 88341 IMHCHEM/IMCYTCHM EA ADD ANTB: CPT | Performed by: PATHOLOGY

## 2021-01-20 PROCEDURE — 55866 LAPS SURG PRST8ECT RPBIC RAD: CPT | Performed by: UROLOGY

## 2021-01-20 RX ORDER — GLYCOPYRROLATE 0.2 MG/ML
INJECTION INTRAMUSCULAR; INTRAVENOUS AS NEEDED
Status: DISCONTINUED | OUTPATIENT
Start: 2021-01-20 | End: 2021-01-20

## 2021-01-20 RX ORDER — FENTANYL CITRATE/PF 50 MCG/ML
25 SYRINGE (ML) INJECTION
Status: DISCONTINUED | OUTPATIENT
Start: 2021-01-20 | End: 2021-01-20 | Stop reason: HOSPADM

## 2021-01-20 RX ORDER — SUCCINYLCHOLINE/SOD CL,ISO/PF 100 MG/5ML
SYRINGE (ML) INTRAVENOUS AS NEEDED
Status: DISCONTINUED | OUTPATIENT
Start: 2021-01-20 | End: 2021-01-20

## 2021-01-20 RX ORDER — DEXTROSE, SODIUM CHLORIDE, SODIUM LACTATE, POTASSIUM CHLORIDE, AND CALCIUM CHLORIDE 5; .6; .31; .03; .02 G/100ML; G/100ML; G/100ML; G/100ML; G/100ML
125 INJECTION, SOLUTION INTRAVENOUS CONTINUOUS
Status: DISCONTINUED | OUTPATIENT
Start: 2021-01-20 | End: 2021-01-21

## 2021-01-20 RX ORDER — ONDANSETRON 2 MG/ML
4 INJECTION INTRAMUSCULAR; INTRAVENOUS EVERY 6 HOURS PRN
Status: DISCONTINUED | OUTPATIENT
Start: 2021-01-20 | End: 2021-01-21 | Stop reason: HOSPADM

## 2021-01-20 RX ORDER — MORPHINE SULFATE 4 MG/ML
4 INJECTION, SOLUTION INTRAMUSCULAR; INTRAVENOUS EVERY 2 HOUR PRN
Status: DISCONTINUED | OUTPATIENT
Start: 2021-01-20 | End: 2021-01-21 | Stop reason: HOSPADM

## 2021-01-20 RX ORDER — ALBUTEROL SULFATE 2.5 MG/3ML
2.5 SOLUTION RESPIRATORY (INHALATION) ONCE AS NEEDED
Status: DISCONTINUED | OUTPATIENT
Start: 2021-01-20 | End: 2021-01-20 | Stop reason: HOSPADM

## 2021-01-20 RX ORDER — SODIUM CHLORIDE, SODIUM LACTATE, POTASSIUM CHLORIDE, CALCIUM CHLORIDE 600; 310; 30; 20 MG/100ML; MG/100ML; MG/100ML; MG/100ML
INJECTION, SOLUTION INTRAVENOUS CONTINUOUS PRN
Status: DISCONTINUED | OUTPATIENT
Start: 2021-01-20 | End: 2021-01-20

## 2021-01-20 RX ORDER — LIDOCAINE HYDROCHLORIDE 10 MG/ML
INJECTION, SOLUTION EPIDURAL; INFILTRATION; INTRACAUDAL; PERINEURAL AS NEEDED
Status: DISCONTINUED | OUTPATIENT
Start: 2021-01-20 | End: 2021-01-20

## 2021-01-20 RX ORDER — PROMETHAZINE HYDROCHLORIDE 25 MG/ML
12.5 INJECTION, SOLUTION INTRAMUSCULAR; INTRAVENOUS ONCE AS NEEDED
Status: DISCONTINUED | OUTPATIENT
Start: 2021-01-20 | End: 2021-01-20 | Stop reason: HOSPADM

## 2021-01-20 RX ORDER — LABETALOL 20 MG/4 ML (5 MG/ML) INTRAVENOUS SYRINGE
10
Status: DISCONTINUED | OUTPATIENT
Start: 2021-01-20 | End: 2021-01-20 | Stop reason: HOSPADM

## 2021-01-20 RX ORDER — PANTOPRAZOLE SODIUM 40 MG/1
40 TABLET, DELAYED RELEASE ORAL
Status: DISCONTINUED | OUTPATIENT
Start: 2021-01-21 | End: 2021-01-21 | Stop reason: HOSPADM

## 2021-01-20 RX ORDER — BACITRACIN, NEOMYCIN, POLYMYXIN B 400; 3.5; 5 [USP'U]/G; MG/G; [USP'U]/G
1 OINTMENT TOPICAL 2 TIMES DAILY
Status: DISCONTINUED | OUTPATIENT
Start: 2021-01-20 | End: 2021-01-21 | Stop reason: HOSPADM

## 2021-01-20 RX ORDER — DEXAMETHASONE SODIUM PHOSPHATE 10 MG/ML
INJECTION, SOLUTION INTRAMUSCULAR; INTRAVENOUS AS NEEDED
Status: DISCONTINUED | OUTPATIENT
Start: 2021-01-20 | End: 2021-01-20

## 2021-01-20 RX ORDER — DOCUSATE SODIUM 100 MG/1
100 CAPSULE, LIQUID FILLED ORAL 2 TIMES DAILY
Status: DISCONTINUED | OUTPATIENT
Start: 2021-01-20 | End: 2021-01-21 | Stop reason: HOSPADM

## 2021-01-20 RX ORDER — CEFAZOLIN SODIUM 2 G/50ML
SOLUTION INTRAVENOUS AS NEEDED
Status: DISCONTINUED | OUTPATIENT
Start: 2021-01-20 | End: 2021-01-20

## 2021-01-20 RX ORDER — OXYCODONE HYDROCHLORIDE 10 MG/1
10 TABLET ORAL EVERY 4 HOURS PRN
Status: DISCONTINUED | OUTPATIENT
Start: 2021-01-20 | End: 2021-01-21 | Stop reason: HOSPADM

## 2021-01-20 RX ORDER — KETAMINE HCL IN NACL, ISO-OSM 100MG/10ML
SYRINGE (ML) INJECTION AS NEEDED
Status: DISCONTINUED | OUTPATIENT
Start: 2021-01-20 | End: 2021-01-20

## 2021-01-20 RX ORDER — ONDANSETRON 2 MG/ML
4 INJECTION INTRAMUSCULAR; INTRAVENOUS ONCE AS NEEDED
Status: DISCONTINUED | OUTPATIENT
Start: 2021-01-20 | End: 2021-01-20 | Stop reason: HOSPADM

## 2021-01-20 RX ORDER — LIDOCAINE HYDROCHLORIDE 10 MG/ML
0.5 INJECTION, SOLUTION EPIDURAL; INFILTRATION; INTRACAUDAL; PERINEURAL ONCE AS NEEDED
Status: DISCONTINUED | OUTPATIENT
Start: 2021-01-20 | End: 2021-01-20 | Stop reason: HOSPADM

## 2021-01-20 RX ORDER — METOCLOPRAMIDE HYDROCHLORIDE 5 MG/ML
10 INJECTION INTRAMUSCULAR; INTRAVENOUS ONCE AS NEEDED
Status: DISCONTINUED | OUTPATIENT
Start: 2021-01-20 | End: 2021-01-20 | Stop reason: HOSPADM

## 2021-01-20 RX ORDER — HYDROMORPHONE HCL/PF 1 MG/ML
0.5 SYRINGE (ML) INJECTION
Status: DISCONTINUED | OUTPATIENT
Start: 2021-01-20 | End: 2021-01-20 | Stop reason: HOSPADM

## 2021-01-20 RX ORDER — MAGNESIUM HYDROXIDE 1200 MG/15ML
LIQUID ORAL AS NEEDED
Status: DISCONTINUED | OUTPATIENT
Start: 2021-01-20 | End: 2021-01-20 | Stop reason: HOSPADM

## 2021-01-20 RX ORDER — SODIUM CHLORIDE, SODIUM LACTATE, POTASSIUM CHLORIDE, CALCIUM CHLORIDE 600; 310; 30; 20 MG/100ML; MG/100ML; MG/100ML; MG/100ML
125 INJECTION, SOLUTION INTRAVENOUS CONTINUOUS
Status: DISCONTINUED | OUTPATIENT
Start: 2021-01-20 | End: 2021-01-21

## 2021-01-20 RX ORDER — NEOSTIGMINE METHYLSULFATE 1 MG/ML
INJECTION INTRAVENOUS AS NEEDED
Status: DISCONTINUED | OUTPATIENT
Start: 2021-01-20 | End: 2021-01-20

## 2021-01-20 RX ORDER — MIDAZOLAM HYDROCHLORIDE 2 MG/2ML
INJECTION, SOLUTION INTRAMUSCULAR; INTRAVENOUS AS NEEDED
Status: DISCONTINUED | OUTPATIENT
Start: 2021-01-20 | End: 2021-01-20

## 2021-01-20 RX ORDER — FENTANYL CITRATE 50 UG/ML
INJECTION, SOLUTION INTRAMUSCULAR; INTRAVENOUS AS NEEDED
Status: DISCONTINUED | OUTPATIENT
Start: 2021-01-20 | End: 2021-01-20

## 2021-01-20 RX ORDER — CEFAZOLIN SODIUM 2 G/50ML
2000 SOLUTION INTRAVENOUS ONCE
Status: DISCONTINUED | OUTPATIENT
Start: 2021-01-20 | End: 2021-01-20 | Stop reason: HOSPADM

## 2021-01-20 RX ORDER — PROPOFOL 10 MG/ML
INJECTION, EMULSION INTRAVENOUS AS NEEDED
Status: DISCONTINUED | OUTPATIENT
Start: 2021-01-20 | End: 2021-01-20

## 2021-01-20 RX ORDER — OXYCODONE HYDROCHLORIDE 5 MG/1
5 TABLET ORAL EVERY 4 HOURS PRN
Status: DISCONTINUED | OUTPATIENT
Start: 2021-01-20 | End: 2021-01-21 | Stop reason: HOSPADM

## 2021-01-20 RX ORDER — ROCURONIUM BROMIDE 10 MG/ML
INJECTION, SOLUTION INTRAVENOUS AS NEEDED
Status: DISCONTINUED | OUTPATIENT
Start: 2021-01-20 | End: 2021-01-20

## 2021-01-20 RX ORDER — LORAZEPAM 2 MG/ML
1 INJECTION INTRAMUSCULAR
Status: DISCONTINUED | OUTPATIENT
Start: 2021-01-20 | End: 2021-01-20 | Stop reason: HOSPADM

## 2021-01-20 RX ORDER — HYDROMORPHONE HCL 110MG/55ML
PATIENT CONTROLLED ANALGESIA SYRINGE INTRAVENOUS AS NEEDED
Status: DISCONTINUED | OUTPATIENT
Start: 2021-01-20 | End: 2021-01-20

## 2021-01-20 RX ORDER — SODIUM CHLORIDE 9 MG/ML
INJECTION, SOLUTION INTRAVENOUS CONTINUOUS PRN
Status: DISCONTINUED | OUTPATIENT
Start: 2021-01-20 | End: 2021-01-20

## 2021-01-20 RX ORDER — ACETAMINOPHEN 325 MG/1
650 TABLET ORAL EVERY 6 HOURS SCHEDULED
Status: DISCONTINUED | OUTPATIENT
Start: 2021-01-20 | End: 2021-01-21 | Stop reason: HOSPADM

## 2021-01-20 RX ORDER — SODIUM CHLORIDE 9 MG/ML
125 INJECTION, SOLUTION INTRAVENOUS CONTINUOUS
Status: DISCONTINUED | OUTPATIENT
Start: 2021-01-20 | End: 2021-01-21

## 2021-01-20 RX ORDER — HYDROMORPHONE HCL/PF 1 MG/ML
0.2 SYRINGE (ML) INJECTION
Status: DISCONTINUED | OUTPATIENT
Start: 2021-01-20 | End: 2021-01-20 | Stop reason: HOSPADM

## 2021-01-20 RX ORDER — ONDANSETRON 2 MG/ML
INJECTION INTRAMUSCULAR; INTRAVENOUS AS NEEDED
Status: DISCONTINUED | OUTPATIENT
Start: 2021-01-20 | End: 2021-01-20

## 2021-01-20 RX ORDER — HYDRALAZINE HYDROCHLORIDE 20 MG/ML
5 INJECTION INTRAMUSCULAR; INTRAVENOUS
Status: DISCONTINUED | OUTPATIENT
Start: 2021-01-20 | End: 2021-01-20 | Stop reason: HOSPADM

## 2021-01-20 RX ADMIN — SODIUM CHLORIDE, SODIUM LACTATE, POTASSIUM CHLORIDE, AND CALCIUM CHLORIDE: .6; .31; .03; .02 INJECTION, SOLUTION INTRAVENOUS at 18:26

## 2021-01-20 RX ADMIN — CEFAZOLIN SODIUM 2000 MG: 2 SOLUTION INTRAVENOUS at 17:34

## 2021-01-20 RX ADMIN — PROPOFOL 100 MG: 10 INJECTION, EMULSION INTRAVENOUS at 17:58

## 2021-01-20 RX ADMIN — PROPOFOL 200 MG: 10 INJECTION, EMULSION INTRAVENOUS at 13:57

## 2021-01-20 RX ADMIN — Medication 25 MCG: at 18:40

## 2021-01-20 RX ADMIN — Medication 25 MCG: at 18:50

## 2021-01-20 RX ADMIN — Medication 25 MCG: at 18:45

## 2021-01-20 RX ADMIN — ROCURONIUM BROMIDE 10 MG: 50 INJECTION, SOLUTION INTRAVENOUS at 17:19

## 2021-01-20 RX ADMIN — DEXAMETHASONE SODIUM PHOSPHATE 10 MG: 10 INJECTION, SOLUTION INTRAMUSCULAR; INTRAVENOUS at 14:06

## 2021-01-20 RX ADMIN — NEOSTIGMINE METHYLSULFATE 4 MG: 1 INJECTION INTRAVENOUS at 18:13

## 2021-01-20 RX ADMIN — ONDANSETRON 4 MG: 2 INJECTION INTRAMUSCULAR; INTRAVENOUS at 17:53

## 2021-01-20 RX ADMIN — ROCURONIUM BROMIDE 50 MG: 50 INJECTION, SOLUTION INTRAVENOUS at 14:12

## 2021-01-20 RX ADMIN — HYDROMORPHONE HYDROCHLORIDE 0.5 MG: 2 INJECTION, SOLUTION INTRAMUSCULAR; INTRAVENOUS; SUBCUTANEOUS at 17:59

## 2021-01-20 RX ADMIN — GLYCOPYRROLATE 0.2 MG: 0.2 INJECTION, SOLUTION INTRAMUSCULAR; INTRAVENOUS at 13:47

## 2021-01-20 RX ADMIN — Medication 50 MG: at 13:53

## 2021-01-20 RX ADMIN — DEXTROSE, SODIUM CHLORIDE, SODIUM LACTATE, POTASSIUM CHLORIDE, AND CALCIUM CHLORIDE 125 ML/HR: 5; .6; .31; .03; .02 INJECTION, SOLUTION INTRAVENOUS at 20:03

## 2021-01-20 RX ADMIN — Medication 160 MG: at 13:59

## 2021-01-20 RX ADMIN — SODIUM CHLORIDE, SODIUM LACTATE, POTASSIUM CHLORIDE, AND CALCIUM CHLORIDE: .6; .31; .03; .02 INJECTION, SOLUTION INTRAVENOUS at 16:10

## 2021-01-20 RX ADMIN — MIDAZOLAM 2 MG: 1 INJECTION INTRAMUSCULAR; INTRAVENOUS at 13:47

## 2021-01-20 RX ADMIN — ACETAMINOPHEN 650 MG: 325 TABLET, FILM COATED ORAL at 20:10

## 2021-01-20 RX ADMIN — BACITRACIN ZINC NEOMYCIN SULFATE POLYMYXIN B SULFATE 1 LARGE APPLICATION: 400; 3.5; 5 OINTMENT TOPICAL at 22:49

## 2021-01-20 RX ADMIN — PHENYLEPHRINE HYDROCHLORIDE 200 MCG: 10 INJECTION INTRAVENOUS at 18:01

## 2021-01-20 RX ADMIN — LIDOCAINE HYDROCHLORIDE 50 MG: 10 INJECTION, SOLUTION EPIDURAL; INFILTRATION; INTRACAUDAL; PERINEURAL at 13:57

## 2021-01-20 RX ADMIN — ROCURONIUM BROMIDE 20 MG: 50 INJECTION, SOLUTION INTRAVENOUS at 16:28

## 2021-01-20 RX ADMIN — SODIUM CHLORIDE, SODIUM LACTATE, POTASSIUM CHLORIDE, AND CALCIUM CHLORIDE: .6; .31; .03; .02 INJECTION, SOLUTION INTRAVENOUS at 12:41

## 2021-01-20 RX ADMIN — SODIUM CHLORIDE: 0.9 INJECTION, SOLUTION INTRAVENOUS at 14:04

## 2021-01-20 RX ADMIN — OXYCODONE HYDROCHLORIDE 5 MG: 5 TABLET ORAL at 22:49

## 2021-01-20 RX ADMIN — HYDROMORPHONE HYDROCHLORIDE 0.5 MG: 2 INJECTION, SOLUTION INTRAMUSCULAR; INTRAVENOUS; SUBCUTANEOUS at 17:38

## 2021-01-20 RX ADMIN — GLYCOPYRROLATE 0.6 MG: 0.2 INJECTION, SOLUTION INTRAMUSCULAR; INTRAVENOUS at 18:13

## 2021-01-20 RX ADMIN — FENTANYL CITRATE 100 MCG: 50 INJECTION INTRAMUSCULAR; INTRAVENOUS at 16:29

## 2021-01-20 RX ADMIN — FENTANYL CITRATE 100 MCG: 50 INJECTION INTRAMUSCULAR; INTRAVENOUS at 13:57

## 2021-01-20 RX ADMIN — CEFAZOLIN SODIUM 2000 MG: 2 SOLUTION INTRAVENOUS at 13:43

## 2021-01-20 RX ADMIN — Medication 25 MCG: at 18:55

## 2021-01-20 NOTE — OP NOTE
OPERATIVE REPORT  PATIENT NAME: Neville Negro    :  1955  MRN: [id number]  Pt Location: [facility] OR ROOM 14    SURGERY DATE: 2021    Surgeon(s) and Role:     * Chasity Nguyễn MD - Primary     * Tamara Fuentes PA-C - Assisting     * [id number] Dez Donnelly PA-C - Assisting    Preop Diagnosis:  Prostate cancer ([facility] [address] ) [place]    Post-Op Diagnosis Codes:     * Prostate cancer ([facility] [address] ) [place]    Procedure(s) (LRB):  PROSTATECTOMY RADICAL LAPAROSCOPIC  W ROBOTICS (N/A)    Specimen(s):  ID Type Source Tests Collected by Time Destination   1 : Anterior bladder neck Tissue Urinary Bladder TISSUE EXAM Chasity Nguyễn MD 2021  3:43 PM    2 : Posterior bladder neck Tissue Urinary Bladder TISSUE EXAM Chasity Nguyễn MD 2021  3:43 PM    3 : with seminal vessicles Tissue Prostate TISSUE EXAM Chasity Nguyễn MD 2021  6:01 PM    4 : posterior bladder neck  Tissue Urinary Bladder TISSUE EXAM Chasity Nguyễn MD 2021  4:19 PM    5 : Posterior bladder neck Tissue Urinary Bladder TISSUE EXAM Chasity Nguyễn MD 2021  4:19 PM    6 : Anterior bladder neck Tissue Urinary Bladder TISSUE EXAM Chasity Nguyễn MD 2021  4:20 PM    7 : anterior bladder neck  Tissue Urinary Bladder TISSUE EXAM Chasity Nguyễn MD 2021  4:21 PM    8 : left pelvic lymph node Tissue Lymph Node TISSUE EXAM Chasity Nguyễn MD 2021  4:32 PM    9 : Right pelvic lymph node Tissue Lymph Node TISSUE EXAM Chasity Nguyễn MD 2021  4:36 PM    10 : posterior bladder neck part 3 Tissue Urinary Bladder TISSUE EXAM Chasity Nguyễn MD 2021  4:49 PM    11 : Posterior bladder neck part 3 Tissue Urinary Bladder TISSUE EXAM Chasity Nguyễn MD 2021  4:50 PM    12 :  Anterior bladder neck Tissue Urinary Bladder TISSUE EXAM Chasity Nguyễn MD 2021  4:52 PM    13 : anterior bladder neck  Tissue Urinary Bladder TISSUE EXAM Chasity Nguyễn MD 2021  4:53 PM        Estimated Blood Loss:   575 mL    Drains:  Closed/Suction Drain Left Abdomen Bulb (Active)   Number of days: 0       Urethral Catheter Latex 20 Fr  (Active)   Number of days: 0       [REMOVED] Urethral Catheter Double-lumen; Latex 18 Fr  (Removed)   Number of days: 0       Anesthesia Type:   General    Operative Indications:  Prostate cancer ([address] ) [C61]      Operative Findings:  Challenging prostatectomy secondary to the recess nature of the prostate beneath the pubic bone  Multiple frozen sections obtained at bladder neck until no prostatic tissue identified  Bilateral non nerve spare approach secondary to tight pelvis  bilateral pelvic lymph node dissection  Complications:   None    Procedure and Technique:  Kalpana Dubon is a 77-year-old male with a history of [place] 4+3=7/10 prostate cancer identified in the left side of the prostate  Risk and benefits of da Tania robot-assisted laparoscopic prostatectomy with bilateral pelvic lymph node dissection were discussed and reviewed  Informed consent was obtained and the patient was brought to the operating room on January 20, 2021  After the smooth induction of general endotracheal anesthesia, the patient was placed in a low lithotomy position  Venodyne boots were applied  Pressure points were padded  The patient was secured to the bed with padding, towels, and tape  Intravenous antibiotics were administered  A timeout was performed with all members of the operative team confirming the patient's identity and procedure to be performed  A alvarenga catheter was placed at the start of the case  An 8 mm supra-umbilical skin incision was made  The skin was elevated  A Veress needle was utilized to create a pneumoperitoneum  An 8 mm robotic camera port was then placed  The patient was placed into steep Trendelenburg  2 additional 8 mm robotic working robotic ports were placed at the level of the umbilicus and approximately 4 fingerbreadths lateral to the umbilicus    An additional left mid quadrant robotic port was along with a  12 mm assistant port in the right mid abdomen  The robot was docked  The urachal structures were taken down  The bladder was dropped and the space of Retzius was developed utilizing monopolar scissors and a fenestrated bipolar dissector  Fibrofatty tissue was cleaned from the dorsum of the prostate and the endopelvic fascia  A large superficial venous complex was identified on the dorsum of the prostate  This was taken with bipolar electro dissection  I then opened the endopelvic fascia bilaterally from base to apex first on the right and then on the left  I dropped the puboprostatic ligaments  I exposed the dorsal venous complex  The dorsal venous complex was extremely wide and required figure-of-eight sutures x3  Muscular fibers were swept off the dorsal venous complex to expose the notch between the DVC and the urethra  The dorsal venous complex was ligated with 0 Vicryl  Next a focused my attention on the prostatic vesical junction  Hot and cold scissor dissection was utilized to create a plane between the bladder and prostate until the Villagran catheter was identified in the midline  The balloon was deflated area the catheter was brought into the surgical field and placed on tension  The posterior bladder neck was taken with hot scissor dissection  A plane was now created behind the bladder and beneath the prostate until denonvilles fascia was identified  I then identified the vasa deferentia  Bilateral vasa deferentia were dissected proximally and transected  I then used each vas as a handle to provide traction to dissect out the ipsilateral seminal vesicle  Both seminal vesicles were dissected to their tip  I then performed exclusive sharp dissection beneath the prostate creating a plane between the prostate and the rectum  Multiple frozen sections were taken from both the anterior and posterior bladder neck    Prostatic glands were initially identified on the 1st 2 frozens  By the 3rd frozen section after resecting specimens of the anterior and posterior bladder neck for permanent section, the frozen section margins at the bladder were negative  Next I focused my attention on the vascular supply to the prostate on the left side  The vascular pedicle was taken from base to apex in the standard fashion with fenestrated bipolar electro dissection and hot and cold scissor dissection  I then attempted a nerve spare on the right side, however, due to the tight nature of the pelvis in deep occasion of the prostate I did not feel that I could get an adequate bundle developed and was concerned about residual prostate tissue, I therefore made the decision to perform a wide resection on the right side as well  This was again performed from base to apex in the standard fashion  The last remaining attachments were the dorsal venous complex and the urethra  The dorsal venous complex was taken with hot scissorsI then placed the prostate on traction and identified the urethra  The urethra was taken sharply with scissors  The Villagran catheter was pulled back and the posterior urethra was taken with sharp scissors as well  The last remaining attachments of the rectourethralis muscle were taken with sharp scissors  At this point this specimen was completely free within the pelvis and placed into an Endo Catch bag  Attention was focused on performing the pelvic lymph node dissection  I first focused on the right side  First I had to reduce a right inguinal hernia so that I could expose the external iliac vein  The external iliac vein was identified and skeletonized  The pelvic lymph node packet adjacent to the pelvic sidewall was elevated off of the sidewall and dissected free from the surrounding tissue  The obturator nerve was identified and preserved  The lymph node packet was completely dissected and removed from the surgical field and sent for permanent specimen    The same procedure was then repeated on the left side again after reducing a small left inguinal hernia       I then performed the anastomosis between the bladder and urethra  The bladder neck required calibration with figure-of-eight sutures x2 on either side of the bladder neck laterally  I then started with 2 V lock sutures placed in an outside in fashion on the bladder neck posteriorly  The anastomosis was performed in an inside out fashion on the urethra and an outside in fashion on the bladder  Once the 2 sutures were placed in either side tension was carefully taken off of the sutures until there was excellent reapproximation of the posterior urethra to the posterior bladder neck  I then completed approximately two thirds of the anastomosis in this fashion  I ultimately locked suture on the bladder side and completed the anastomosis in an outside in fashion on the urethra and in an inside out fashion on the bladder  Prior to completing the anastomosis a new Villagran catheter was placed under vision  The anastomosis was completed and the sutures were secured  15 cc were placed into the balloon  The catheter was placed on gentle traction and the bladder was irrigated with over 150 cc of sterile saline with minimal extravasation  A Osiel-Rodgers drain was then brought out through the left lower quadrant robotic port  The Endo Catch bag string was brought out through the supra umbilical 8 mm port  The supraumbilical 8 mm port was opened to allow for easy removal of the prostate and seminal vesicles within the Endo Catch bag  The fascia was then reapproximated with 0 Prolene suture  All incisions were irrigated and the skin was closed with 4-0 Monocryl and history  The drain was secured in place with a drain stitch and a drain sponge was placed  The catheter was placed onto gentle traction and secured  Overall the patient tolerated the procedure well and there were no complications    The patient was extubated in the operating room and transferred to the PACU in stable condition at the conclusion of the case        Patient Disposition:  PACU stable and extubated    SIGNATURE: Yelitza Brian MD  DATE: January 20, 2021  TIME: 6:16 PM

## 2021-01-20 NOTE — ANESTHESIA PREPROCEDURE EVALUATION
Procedure:  PROSTATECTOMY RADICAL LAPAROSCOPIC  W ROBOTICS (N/A Abdomen)    Relevant Problems   ANESTHESIA (within normal limits)      GI/HEPATIC   (+) Gastroesophageal reflux disease      /RENAL   (+) Prostate cancer (HCC)        Physical Exam    Airway    Mallampati score: II  TM Distance: >3 FB  Neck ROM: full     Dental       Cardiovascular  Rhythm: regular, Rate: normal,     Pulmonary  Pulmonary exam normal Breath sounds clear to auscultation,     Other Findings        Anesthesia Plan  ASA Score- 2     Anesthesia Type- general with ASA Monitors  Additional Monitors:   Airway Plan: ETT  Plan Factors-Exercise tolerance (METS): >4 METS  Chart reviewed  Existing labs reviewed  Patient summary reviewed  Patient is a current smoker  Patient instructed to abstain from smoking on day of procedure  Patient did not smoke on day of surgery  Induction- intravenous  Postoperative Plan- Plan for postoperative opioid use  Planned trial extubation    Informed Consent- Anesthetic plan and risks discussed with patient  I personally reviewed this patient with the CRNA  Discussed and agreed on the Anesthesia Plan with the CRNA  Mariely William

## 2021-01-20 NOTE — ANESTHESIA POSTPROCEDURE EVALUATION
Post-Op Assessment Note    CV Status:  Stable  Pain Score: 4    Pain management: inadequate     Mental Status:  Awake   Hydration Status:  Stable   PONV Controlled:  None   Airway Patency:  Patent      Post Op Vitals Reviewed: Yes      Staff: Anesthesiologist, CRNA         No complications documented      BP   163/91   Temp   97 6   Pulse  90   Resp   16   SpO2   98

## 2021-01-20 NOTE — INTERVAL H&P NOTE
H&P reviewed  After examining the patient I find no changes in the patients condition since the H&P had been written  Vitals:    01/20/21 1156   BP: 153/98   Pulse: 74   Resp: 20   Temp: 97 9 °F (36 6 °C)   SpO2: 99%       History of unilateral (left) [place] 4+3=7/10 prostate cancer  PSA 10  Plan for robot assisted laparoscopic prostatectomy with bilateral pelvic lymph node dissection  Discussed performing a wide resection on the left side and a nerve spare attempt on the right side  He clearly understands the risk of intraoperative injury, postoperative incontinence, and postoperative erectile dysfunction  He also understands the risk of possible disease recurrence and the need for additional therapy or treatment after surgery such as radiation or hormone therapy  Informed consent was previously obtained and reviewed today prior to surgery 
01-Dec-2019

## 2021-01-21 ENCOUNTER — TELEPHONE (OUTPATIENT)
Dept: OTHER | Facility: HOSPITAL | Age: 66
End: 2021-01-21

## 2021-01-21 VITALS
WEIGHT: 200 LBS | SYSTOLIC BLOOD PRESSURE: 114 MMHG | DIASTOLIC BLOOD PRESSURE: 67 MMHG | HEIGHT: 72 IN | BODY MASS INDEX: 27.09 KG/M2 | RESPIRATION RATE: 20 BRPM | TEMPERATURE: 99 F | OXYGEN SATURATION: 95 % | HEART RATE: 65 BPM

## 2021-01-21 LAB
ANION GAP SERPL CALCULATED.3IONS-SCNC: 7 MMOL/L (ref 4–13)
BUN SERPL-MCNC: 13 MG/DL (ref 5–25)
CALCIUM SERPL-MCNC: 8.3 MG/DL (ref 8.3–10.1)
CHLORIDE SERPL-SCNC: 108 MMOL/L (ref 100–108)
CO2 SERPL-SCNC: 25 MMOL/L (ref 21–32)
CREAT SERPL-MCNC: 1.01 MG/DL (ref 0.6–1.3)
ERYTHROCYTE [DISTWIDTH] IN BLOOD BY AUTOMATED COUNT: 13.2 % (ref 11.6–15.1)
GFR SERPL CREATININE-BSD FRML MDRD: 78 ML/MIN/1.73SQ M
GLUCOSE SERPL-MCNC: 115 MG/DL (ref 65–140)
HCT VFR BLD AUTO: 38.1 % (ref 36.5–49.3)
HGB BLD-MCNC: 12.2 G/DL (ref 12–17)
MCH RBC QN AUTO: 29.5 PG (ref 26.8–34.3)
MCHC RBC AUTO-ENTMCNC: 32 G/DL (ref 31.4–37.4)
MCV RBC AUTO: 92 FL (ref 82–98)
PLATELET # BLD AUTO: 205 THOUSANDS/UL (ref 149–390)
PMV BLD AUTO: 10.5 FL (ref 8.9–12.7)
POTASSIUM SERPL-SCNC: 4.4 MMOL/L (ref 3.5–5.3)
RBC # BLD AUTO: 4.14 MILLION/UL (ref 3.88–5.62)
SODIUM SERPL-SCNC: 140 MMOL/L (ref 136–145)
WBC # BLD AUTO: 18.16 THOUSAND/UL (ref 4.31–10.16)

## 2021-01-21 PROCEDURE — 80048 BASIC METABOLIC PNL TOTAL CA: CPT | Performed by: UROLOGY

## 2021-01-21 PROCEDURE — 99024 POSTOP FOLLOW-UP VISIT: CPT | Performed by: NURSE PRACTITIONER

## 2021-01-21 PROCEDURE — 85027 COMPLETE CBC AUTOMATED: CPT | Performed by: UROLOGY

## 2021-01-21 RX ORDER — DOCUSATE SODIUM 100 MG/1
100 CAPSULE, LIQUID FILLED ORAL 2 TIMES DAILY
Qty: 30 CAPSULE | Refills: 0 | Status: SHIPPED | OUTPATIENT
Start: 2021-01-21

## 2021-01-21 RX ORDER — OXYCODONE HYDROCHLORIDE 5 MG/1
5 TABLET ORAL EVERY 6 HOURS PRN
Qty: 10 TABLET | Refills: 0 | Status: SHIPPED | OUTPATIENT
Start: 2021-01-21 | End: 2021-01-31

## 2021-01-21 RX ORDER — OXYBUTYNIN CHLORIDE 5 MG/1
5 TABLET ORAL 3 TIMES DAILY PRN
Qty: 30 TABLET | Refills: 0 | Status: SHIPPED | OUTPATIENT
Start: 2021-01-21

## 2021-01-21 RX ADMIN — ACETAMINOPHEN 650 MG: 325 TABLET, FILM COATED ORAL at 06:11

## 2021-01-21 RX ADMIN — DOCUSATE SODIUM 100 MG: 100 CAPSULE, LIQUID FILLED ORAL at 08:18

## 2021-01-21 RX ADMIN — BACITRACIN ZINC NEOMYCIN SULFATE POLYMYXIN B SULFATE 1 LARGE APPLICATION: 400; 3.5; 5 OINTMENT TOPICAL at 08:18

## 2021-01-21 RX ADMIN — OXYCODONE HYDROCHLORIDE 5 MG: 5 TABLET ORAL at 08:18

## 2021-01-21 RX ADMIN — OXYCODONE HYDROCHLORIDE 5 MG: 5 TABLET ORAL at 13:03

## 2021-01-21 RX ADMIN — OXYCODONE HYDROCHLORIDE 10 MG: 10 TABLET ORAL at 04:00

## 2021-01-21 RX ADMIN — ACETAMINOPHEN 650 MG: 325 TABLET, FILM COATED ORAL at 00:05

## 2021-01-21 RX ADMIN — PANTOPRAZOLE SODIUM 40 MG: 40 TABLET, DELAYED RELEASE ORAL at 06:11

## 2021-01-21 RX ADMIN — DEXTROSE, SODIUM CHLORIDE, SODIUM LACTATE, POTASSIUM CHLORIDE, AND CALCIUM CHLORIDE 125 ML/HR: 5; .6; .31; .03; .02 INJECTION, SOLUTION INTRAVENOUS at 03:59

## 2021-01-21 RX ADMIN — ACETAMINOPHEN 650 MG: 325 TABLET, FILM COATED ORAL at 13:03

## 2021-01-21 RX ADMIN — DEXTROSE, SODIUM CHLORIDE, SODIUM LACTATE, POTASSIUM CHLORIDE, AND CALCIUM CHLORIDE 125 ML/HR: 5; .6; .31; .03; .02 INJECTION, SOLUTION INTRAVENOUS at 11:34

## 2021-01-21 RX ADMIN — ENOXAPARIN SODIUM 40 MG: 40 INJECTION SUBCUTANEOUS at 08:10

## 2021-01-21 RX ADMIN — MORPHINE SULFATE 4 MG: 4 INJECTION INTRAVENOUS at 00:04

## 2021-01-21 NOTE — PLAN OF CARE
Problem: PAIN - ADULT  Goal: Verbalizes/displays adequate comfort level or baseline comfort level  Description: Interventions:  - Encourage patient to monitor pain and request assistance  - Assess pain using appropriate pain scale  - Administer analgesics based on type and severity of pain and evaluate response  - Implement non-pharmacological measures as appropriate and evaluate response  - Consider cultural and social influences on pain and pain management  - Notify physician/advanced practitioner if interventions unsuccessful or patient reports new pain  Outcome: Progressing     Problem: INFECTION - ADULT  Goal: Absence or prevention of progression during hospitalization  Description: INTERVENTIONS:  - Assess and monitor for signs and symptoms of infection  - Monitor lab/diagnostic results  - Monitor all insertion sites, i e  indwelling lines, tubes, and drains  - Monitor endotracheal if appropriate and nasal secretions for changes in amount and color  - [place] appropriate cooling/warming therapies per order  - Administer medications as ordered  - Instruct and encourage patient and family to use good hand hygiene technique  - Identify and instruct in appropriate isolation precautions for identified infection/condition  Outcome: Progressing     Problem: SAFETY ADULT  Goal: Patient will remain free of falls  Description: INTERVENTIONS:  - Assess patient frequently for physical needs  -  Identify cognitive and physical deficits and behaviors that affect risk of falls    -  [place] fall precautions as indicated by assessment   - Educate patient/family on patient safety including physical limitations  - Instruct patient to call for assistance with activity based on assessment  - Modify environment to reduce risk of injury  - Consider OT/PT consult to assist with strengthening/mobility  Outcome: Progressing  Goal: Maintain or return to baseline ADL function  Description: INTERVENTIONS:  -  Assess patient's ability to carry out ADLs; assess patient's baseline for ADL function and identify physical deficits which impact ability to perform ADLs (bathing, care of mouth/teeth, toileting, grooming, dressing, etc )  - Assess/evaluate cause of self-care deficits   - Assess range of motion  - Assess patient's mobility; develop plan if impaired  - Assess patient's need for assistive devices and provide as appropriate  - Encourage maximum independence but intervene and supervise when necessary  - Involve family in performance of ADLs  - Assess for home care needs following discharge   - Consider OT consult to assist with ADL evaluation and planning for discharge  - Provide patient education as appropriate  Outcome: Progressing  Goal: Maintain or return mobility status to optimal level  Description: INTERVENTIONS:  - Assess patient's baseline mobility status (ambulation, transfers, stairs, etc )    - Identify cognitive and physical deficits and behaviors that affect mobility  - Identify mobility aids required to assist with transfers and/or ambulation (gait belt, sit-to-stand, lift, walker, cane, etc )  - [place] fall precautions as indicated by assessment  - Record patient progress and toleration of activity level on Mobility SBAR; progress patient to next Phase/Stage  - Instruct patient to call for assistance with activity based on assessment  - Consider rehabilitation consult to assist with strengthening/weightbearing, etc   Outcome: Progressing     Problem: DISCHARGE PLANNING  Goal: Discharge to home or other facility with appropriate resources  Description: INTERVENTIONS:  - Identify barriers to discharge w/patient and caregiver  - Arrange for needed discharge resources and transportation as appropriate  - Identify discharge learning needs (meds, wound care, etc )  - Arrange for interpretive services to assist at discharge as needed  - Refer to Case Management Department for coordinating discharge planning if the patient needs post-hospital services based on physician/advanced practitioner order or complex needs related to functional status, cognitive ability, or social support system  Outcome: Progressing     Problem: Knowledge Deficit  Goal: Patient/family/caregiver demonstrates understanding of disease process, treatment plan, medications, and discharge instructions  Description: Complete learning assessment and assess knowledge base    Interventions:  - Provide teaching at level of understanding  - Provide teaching via preferred learning methods  Outcome: Progressing

## 2021-01-21 NOTE — PLAN OF CARE
Problem: PAIN - ADULT  Goal: Verbalizes/displays adequate comfort level or baseline comfort level  Description: Interventions:  - Encourage patient to monitor pain and request assistance  - Assess pain using appropriate pain scale  - Administer analgesics based on type and severity of pain and evaluate response  - Implement non-pharmacological measures as appropriate and evaluate response  - Consider cultural and social influences on pain and pain management  - Notify physician/advanced practitioner if interventions unsuccessful or patient reports new pain  Outcome: Progressing     Problem: INFECTION - ADULT  Goal: Absence or prevention of progression during hospitalization  Description: INTERVENTIONS:  - Assess and monitor for signs and symptoms of infection  - Monitor lab/diagnostic results  - Monitor all insertion sites, i e  indwelling lines, tubes, and drains  - Monitor endotracheal if appropriate and nasal secretions for changes in amount and color  - [place] appropriate cooling/warming therapies per order  - Administer medications as ordered  - Instruct and encourage patient and family to use good hand hygiene technique  - Identify and instruct in appropriate isolation precautions for identified infection/condition  Outcome: Progressing     Problem: SAFETY ADULT  Goal: Patient will remain free of falls  Description: INTERVENTIONS:  - Assess patient frequently for physical needs  -  Identify cognitive and physical deficits and behaviors that affect risk of falls    -  [place] fall precautions as indicated by assessment   - Educate patient/family on patient safety including physical limitations  - Instruct patient to call for assistance with activity based on assessment  - Modify environment to reduce risk of injury  - Consider OT/PT consult to assist with strengthening/mobility  Outcome: Progressing  Goal: Maintain or return to baseline ADL function  Description: INTERVENTIONS:  -  Assess patient's ability to carry out ADLs; assess patient's baseline for ADL function and identify physical deficits which impact ability to perform ADLs (bathing, care of mouth/teeth, toileting, grooming, dressing, etc )  - Assess/evaluate cause of self-care deficits   - Assess range of motion  - Assess patient's mobility; develop plan if impaired  - Assess patient's need for assistive devices and provide as appropriate  - Encourage maximum independence but intervene and supervise when necessary  - Involve family in performance of ADLs  - Assess for home care needs following discharge   - Consider OT consult to assist with ADL evaluation and planning for discharge  - Provide patient education as appropriate  Outcome: Progressing  Goal: Maintain or return mobility status to optimal level  Description: INTERVENTIONS:  - Assess patient's baseline mobility status (ambulation, transfers, stairs, etc )    - Identify cognitive and physical deficits and behaviors that affect mobility  - Identify mobility aids required to assist with transfers and/or ambulation (gait belt, sit-to-stand, lift, walker, cane, etc )  - [place] fall precautions as indicated by assessment  - Record patient progress and toleration of activity level on Mobility SBAR; progress patient to next Phase/Stage  - Instruct patient to call for assistance with activity based on assessment  - Consider rehabilitation consult to assist with strengthening/weightbearing, etc   Outcome: Progressing     Problem: DISCHARGE PLANNING  Goal: Discharge to home or other facility with appropriate resources  Description: INTERVENTIONS:  - Identify barriers to discharge w/patient and caregiver  - Arrange for needed discharge resources and transportation as appropriate  - Identify discharge learning needs (meds, wound care, etc )  - Arrange for interpretive services to assist at discharge as needed  - Refer to Case Management Department for coordinating discharge planning if the patient needs post-hospital services based on physician/advanced practitioner order or complex needs related to functional status, cognitive ability, or social support system  Outcome: Progressing     Problem: Knowledge Deficit  Goal: Patient/family/caregiver demonstrates understanding of disease process, treatment plan, medications, and discharge instructions  Description: Complete learning assessment and assess knowledge base  Interventions:  - Provide teaching at level of understanding  - Provide teaching via preferred learning methods  Outcome: Progressing     Problem: Potential for Falls  Goal: Patient will remain free of falls  Description: INTERVENTIONS:  - Assess patient frequently for physical needs  -  Identify cognitive and physical deficits and behaviors that affect risk of falls    -  [place] fall precautions as indicated by assessment   - Educate patient/family on patient safety including physical limitations  - Instruct patient to call for assistance with activity based on assessment  - Modify environment to reduce risk of injury  - Consider OT/PT consult to assist with strengthening/mobility  Outcome: Progressing     Problem: Prexisting or High Potential for Compromised Skin Integrity  Goal: Skin integrity is maintained or improved  Description: INTERVENTIONS:  - Identify patients at risk for skin breakdown  - Assess and monitor skin integrity  - Assess and monitor nutrition and hydration status  - Monitor labs   - Assess for incontinence   - Turn and reposition patient  - Assist with mobility/ambulation  - Relieve pressure over bony prominences  - Avoid friction and shearing  - Provide appropriate hygiene as needed including keeping skin clean and dry  - Evaluate need for skin moisturizer/barrier cream  - Collaborate with interdisciplinary team   - Patient/family teaching  - Consider wound care consult   Outcome: Progressing

## 2021-01-21 NOTE — DISCHARGE INSTR - AVS FIRST PAGE
-please make sure hydrating with upwards of 64 oz of water per day  -ambulate  -call the office for any fevers, or if the catheter stops draining  -call for any concerns  -take Colace twice a day  -can apply Neosporin to tip of penis for comfort  -cleanse catheter as directed daily  -can take p r n   Oxybutynin for any bladder spasms or discomfort with catheter

## 2021-01-21 NOTE — TELEPHONE ENCOUNTER
Status post RALP by Dr Severo Fried on 01/20/2021 please schedule Villagran removal/void trial in 2 weeks

## 2021-01-21 NOTE — QUICK NOTE
SE drain removed without any difficulty  Dry sterile dressing applied    Will discharge patient home

## 2021-01-21 NOTE — PROGRESS NOTES
Progress Note - Urology  Graham Singh 1955, 72 y o  male MRN: [id number]    Unit/Bed#: [facility] 814-01 Encounter: [id number]    * Prostate cancer [facility])  Assessment & Plan  -Mario 4+3=7/10 prostate cancer  -POD#1 RALP with bilateral pelvic lymph node dissection by Dr Samia Ramirez 01/20/2021  -out of bed and ambulate  -advance diet  -encourage use of incentive spirometer every 2 hours  -temp 99°, vital signs stable  -WBC 18 16 (16 72 01/20/2021)  -CREAT 1 01  -H&H 12 2/38 1  -SE drain 25 mL serosanguineous drainage since midnight  -plan to remove SE drain later today  -possible discharge home with Villagran catheter today if patient starts to pass gas and tolerates his diet  -follow-up in office in 2 weeks for pathology discussion and catheter removal        Subjective: 72 y o  male with a history of an elevated PSA  His PSA in early 2020 was noted to be just below 10  This prompted a transrectal ultrasound-guided biopsy of the prostate which was ultimately performed in September 2020 is the patient required sedation/anesthesia in the operating room  Pathology revealed 4 of 12 cores positive with Mario 4 + 3 disease involving between 5 in 90% of the core  All cancer was identified on the left side of the prostate  The patient has moderate erectile dysfunction  He denies any lower urinary tract symptoms  He was previously scheduled for surgery but canceled the date  For preoperative planning he had a CT scan which showed a suspicious lesion in the bone of the pelvis  A follow-up bone scan was negative for metastatic disease  24 HR EVENTS:   No fevers, chills, reports abdominal discomfort  Has only ambulated in his room      Patient has  complaints of Abdominal discomfort  Review of Systems   Constitutional: Negative for activity change, appetite change, chills, fatigue, fever and unexpected weight change  HENT: Negative for facial swelling  Eyes: Negative for discharge  Respiratory: Negative  Negative for cough and shortness of breath  Cardiovascular: Negative for chest pain and leg swelling  Gastrointestinal: Negative  Negative for abdominal distention, abdominal pain, constipation, diarrhea, nausea and vomiting  Abdominal incisions clean dry and intact  No bowel movement  Has not been passing gas  Tolerating his clear liquid diet  Endocrine: Negative  Genitourinary: Negative  Negative for decreased urine volume, dysuria, enuresis, flank pain, frequency, hematuria and urgency  Villagran catheter draining yellow urine   Musculoskeletal: Negative for back pain and myalgias  Ambulated only to the bathroom  Encourage out of bed and ambulating around the halls  Skin: Negative for pallor and rash  Allergic/Immunologic: Negative  Negative for immunocompromised state  Neurological: Negative for facial asymmetry and speech difficulty  Psychiatric/Behavioral: Negative for agitation and confusion  Objective:  Nursing Rounds:   Vitals: Blood pressure 111/74, pulse 70, temperature 99 °F (37 2 °C), resp  rate 16, height 6' (1 829 m), weight 90 7 kg (200 lb), SpO2 97 %  ,Body mass index is 27 12 kg/m²  INS & OUTS:  I/O last 24 hours: In: 0197 [P O :240; I V :3000]  Out: 1960 [Urine:1290; Drains:95; Blood:575]    Physical Exam  Vitals signs and nursing note reviewed  Constitutional:       General: He is not in acute distress  Appearance: Normal appearance  He is not ill-appearing, toxic-appearing or diaphoretic  HENT:      Head: Normocephalic  Cardiovascular:      Rate and Rhythm: Normal rate and regular rhythm  Pulmonary:      Effort: Pulmonary effort is normal  No respiratory distress  Breath sounds: Normal breath sounds  No wheezing or rhonchi  Abdominal:      General: Abdomen is flat  Bowel sounds are normal  There is distension  Palpations: Abdomen is soft  Tenderness: There is no guarding        Comments: Reports some abdominal discomfort and feeling bloated  Genitourinary:     Comments: Villagran catheter draining clear yellow urine without any blood clots  Musculoskeletal:         General: No swelling  Skin:     General: Skin is warm and dry  Neurological:      General: No focal deficit present  Mental Status: He is alert and oriented to person, place, and time  Psychiatric:         Mood and Affect: Mood normal          Behavior: Behavior normal          Imaging:    Imaging reviewed - both report and images personally reviewed  Labs:  Recent Labs     01/20/21  1851 01/21/21  0521   WBC 16 72* 18 16*       Recent Labs     01/20/21  1851 01/21/21  0521   HGB 14 1 12 2     Recent Labs     01/20/21  1851 01/21/21  0521   HCT 44 2 38 1     Recent Labs     01/20/21  1851 01/21/21  0521   CREATININE 1 33* 1 01     Lab Results   Component Value Date    HGB 12 2 01/21/2021    HCT 38 1 01/21/2021    WBC 18 16 (H) 01/21/2021     01/21/2021     Lab Results   Component Value Date    K 4 4 01/21/2021     01/21/2021    CO2 25 01/21/2021    BUN 13 01/21/2021    CREATININE 1 01 01/21/2021    CALCIUM 8 3 01/21/2021     Urinalysis: not done  Urine Culture:     History:    Past Medical History:   Diagnosis Date    Anal fissure     Cancer ([facility] [place] [address] )     Colon polyp     GERD (gastroesophageal reflux disease)      Past Surgical History:   Procedure Laterality Date    [facility]     EGD      HERNIA REPAIR      inguinal    OR BIOPSY OF PROSTATE,NEEDLE/PUNCH N/A 9/11/2020    Procedure: TRANSRECTAL NEEDLE BIOPSY PROSTATE (TRNBP);   Surgeon: Noemí Pastor MD;  Location: [facility];  Service: Urology    OR LAP,PROSTATECTOMY,RADICAL,W/NERVE SPARE,INCL ROBOTIC N/A 1/20/2021    Procedure: Indigo Me[place]  W ROBOTICS;  Surgeon: Noemí Pastor MD;  Location: [facility];  Service: Urology    SPERMATOCELECTOMY  02/2018    right spermatocelectomy and multiple lipoma removal    US GUIDED PROSTATE BIOPSY  2020     Family History   Family history unknown: Yes     Social History     Socioeconomic History    Marital status: Single     Spouse name: None    Number of children: None    Years of education: None    Highest education level: None   Occupational History    Occupation: self employed   Social Needs    Financial resource strain: None    Food insecurity     Worry: None     Inability: None    Transportation needs     Medical: None     Non-medical: None   Tobacco Use    Smoking status: Former Smoker     Packs/day: 1 00     Years: 3 00     Pack years: 3 00     Quit date:      Years since quittin 0    Smokeless tobacco: Never Used   Substance and Sexual Activity    Alcohol use: Yes     Alcohol/week: 10 0 standard drinks     Types: 10 Cans of beer per week     Frequency: 2-4 times a month     Drinks per session: 10 or more     Binge frequency: Weekly     Comment: Weekends    Drug use: Yes     Frequency: 2 0 times per week     Types: Marijuana     Comment: Weekends    Sexual activity: Yes     Partners: Female   Lifestyle    Physical activity     Days per week: None     Minutes per session: None    Stress: None   Relationships    Social connections     Talks on phone: None     Gets together: None     Attends Nondenominational service: None     Active member of club or organization: None     Attends meetings of clubs or organizations: None     Relationship status: None    Intimate partner violence     Fear of current or ex partner: None     Emotionally abused: None     Physically abused: None     Forced sexual activity: None   Other Topics Concern    None   Social History Narrative    Most recent tobacco use screenin2018    Live alone or with others: with others    Marital status: Single    Occupation: self employed    Are you currently employed:  Yes    Alcohol intake: Occasional       JERAMIE Lozada  Date: 2021 Time: 10:42 AM

## 2021-01-21 NOTE — DISCHARGE INSTRUCTIONS
Robot Assisted Laparoscopic Prostatectomy   WHAT YOU NEED TO KNOW:   Robot-assisted laparoscopic prostatectomy (RALP) is surgery to remove your prostate gland through small incisions in your abdomen  RALP is done with a machine that is controlled by your surgeon  The machine has mechanical arms that use small tools to remove your prostate  DISCHARGE INSTRUCTIONS:   Medicines:   · Pain medicine: You may be given a prescription medicine to decrease pain  Do not wait until the pain is severe before you take this medicine  · Stool softeners: This medicine makes it easier for you to have a bowel movement  You may need this medicine to treat or prevent constipation  · Antibiotics: This medicine is given to fight or prevent an infection caused by bacteria  Always take your antibiotics exactly as ordered by your healthcare provider  Do not stop taking your medicine unless directed by your healthcare provider  Never save antibiotics or take leftover antibiotics that were given to you for another illness  · Take your medicine as directed  Contact your healthcare provider if you think your medicine is not helping or if you have side effects  Tell him or her if you are allergic to any medicine  Keep a list of the medicines, vitamins, and herbs you take  Include the amounts, and when and why you take them  Bring the list or the pill bottles to follow-up visits  Carry your medicine list with you in case of an emergency  Follow up with your healthcare provider or uro-oncologist in 1 week or as directed: You will need your urinary catheter removed  Tests will show if any cancer remains in your body after surgery  Write down your questions so you remember to ask them during your visits  A Villagran catheter  is a tube put into your bladder to drain urine into a bag  Keep the bag below your waist  This will prevent urine from flowing back into your bladder and causing an infection or other problems   Also, keep the tube free of kinks so the urine will drain properly  Do not pull on the catheter  This can cause pain and bleeding, and may cause the catheter to come out  Wound care: Follow your healthcare provider's directions on how to care for your incisions  Ask when you can start showering or bathing  You will need to keep your stitches covered so they do not get wet  What to expect after surgery:   · Activities: You may feel like resting more after surgery  Slowly start to do more each day  Rest when you feel it is needed  ? Normal daily activities:  Ask your healthcare provider when it is safe to return to your normal daily activities  You may need to wait 4 to 6 weeks after surgery  Your healthcare provider will tell you about the activities you should avoid after your surgery  These may include driving while you are taking pain medicines or until your catheter is removed  You may also be given a weight restriction on lifting objects  ? Walking and other exercise:  Walking is an important activity to help with your recovery after surgery  Walking is a good way to improve your overall health and help you recover sooner  It also helps keep your blood flowing and reduces the risk of blood clots  Other types of exercise can also be an important part of your recovery  Ask about the exercises that are safe for you     ? Sexual activity:  Ask when it is safe to start having sexual intercourse again  You may have trouble having an erection  Your erections may return with time  Medicines and mechanical aids may help  Talk to your healthcare provider about these options  · Bladder control:  After surgery, you may have problems controlling when you urinate  Ask your healthcare provider about pads and liners that absorb urine while you recover  · Constipation:  You may have problems having bowel movements during your recovery   Ask your healthcare provider about diet changes if you are having problems with constipation  Contact your healthcare provider or uro-oncologist if:   · You have a fever  · Your pain is getting worse, even with medicine  · You have an incision that is red, swollen, or has pus coming from it  · You are urinating less than usual      · You have trouble urinating or having a bowel movement  · You have questions or concerns about your condition or care  Seek care immediately or call 911 if:   · You have more blood in your urine than you were told to expect, or you pass a blood clot  · You have an upset stomach or are vomiting  · You have painful swelling in your abdomen that does not go away  · You suddenly feel lightheaded and short of breath  · You have chest pain when you take a deep breath or cough  You cough up blood  · Your arm or leg feels warm, tender, and painful  It may look swollen and red  © Copyright [employer] 2018 Information is for End User's use only and may not be sold, redistributed or otherwise used for commercial purposes  All illustrations and images included in CareNotes® are the copyrighted property of A D A M , Inc  or [facility]   The above information is an  only  It is not intended as medical advice for individual conditions or treatments  Talk to your doctor, nurse or pharmacist before following any medical regimen to see if it is safe and effective for you

## 2021-01-21 NOTE — ASSESSMENT & PLAN NOTE
-[place] 4+3=7/10 prostate cancer  -POD#1 RALP with bilateral pelvic lymph node dissection by Dr Anita Earl 01/20/2021  -out of bed and ambulate  -advance diet  -encourage use of incentive spirometer every 2 hours  -temp 99°, vital signs stable  -WBC 18 16 (16 72 01/20/2021)  -CREAT 1 01  -H&H 12 2/38 1  -SE drain 25 mL serosanguineous drainage since midnight  -plan to remove SE drain later today  -possible discharge home with Villagran catheter today if patient starts to pass gas and tolerates his diet  -follow-up in office in 2 weeks for pathology discussion and catheter removal

## 2021-01-21 NOTE — TELEPHONE ENCOUNTER
Post Op Note    Katey Jj is a 72 y o  male s/p PROSTATECTOMY RADICAL LAPAROSCOPIC  W ROBOTICS  performed 1/20/2021  Katey Jj is a patient of Dr Jordan Loving and is seen at the [facility]  Patient remains inpatient at this time  Will monitor for discharge and contact patient at that time  Patient is scheduled for post op appointment with Dr Jordan Loving 2/3/2021 at [facility]

## 2021-01-22 LAB
ABO GROUP BLD BPU: NORMAL
ABO GROUP BLD BPU: NORMAL
BPU ID: NORMAL
BPU ID: NORMAL
CROSSMATCH: NORMAL
CROSSMATCH: NORMAL
UNIT DISPENSE STATUS: NORMAL
UNIT DISPENSE STATUS: NORMAL
UNIT PRODUCT CODE: NORMAL
UNIT PRODUCT CODE: NORMAL
UNIT RH: NORMAL
UNIT RH: NORMAL

## 2021-01-22 NOTE — TELEPHONE ENCOUNTER
Post Op Note     Karyle Rolling is a 72 y o  male s/p PROSTATECTOMY RADICAL LAPAROSCOPIC  W ROBOTICS  performed 1/20/2021  Karyle Rolling is a patient of Dr Axel Louise and is seen at the [place] office  How would you rate your pain on a scale from 1 to 10, 10 being the worst pain ever? 2  Have you had a fever? No  Have your bowel movements been regular? Not yet, instructed on use of Colace and increased water intake   If the patient has an incision- do you have any redness around the incision or any drainage from the incision? No  If the patient has a alvarenga- are you comfortable caring for your alvarenga? Yes Is it draining urine? Yes    Do you have any other questions or concerns that I can address at this time? Patient unhappy that he was no discharged on antibiotics  I advised that he was given abx via IV intraoperatively  If he were to develop any signs of acute infection, he should contact office and we would discuss  Patient also unhappy that Dr Axel Louise did not see him before discharge and "he sent someone else to come see me"  I advised that provider that is covering inpatients normally does the discharge as the MDs are often in other surgeries or in the office  Advised that if there was any concern regarding discharge, the provider would have spoken with Dr Axel Louise

## 2021-01-25 ENCOUNTER — NURSE TRIAGE (OUTPATIENT)
Dept: OTHER | Facility: OTHER | Age: 66
End: 2021-01-25

## 2021-01-26 NOTE — TELEPHONE ENCOUNTER
Patient called in requesting to speak with Dr Cassi Aceves nurse  Patient would not provided details   Patient can be reached at [phone]

## 2021-01-26 NOTE — TELEPHONE ENCOUNTER
Patient will need to call their PCP and or gastroenterologist for / colorectal provider to obtain additional interventions

## 2021-01-26 NOTE — TELEPHONE ENCOUNTER
Regarding: Lumps in Rectum  ----- Message from Ginger Keane sent at 1/25/2021  7:56 PM EST -----  "I just got out of surgery at the hospital and have been straining to go to the bathroom, I stuck my finger up my rectum and felt 2 lumps, I want to know if this is normal"

## 2021-01-26 NOTE — TELEPHONE ENCOUNTER
Patient called office to report that, in regards to surgery, everything is "going well"  Patient reports some straining with bowel movements last week but has since resolved and had no straining since Friday  Patient does report two hard lumps inside rectum about "a knuckle length inside" that "stick out like the tip of a pinkie finger"  Denies bleeding, pain, or any other symptoms at this time  States that this was noted today and lumps were not there on Sunday  Patient does have external hemorrhoids and he had a colonoscopy in May 2020  Advised to call his proctologist/PCP as this should not be urological in nature  Patient reports he prefers to hear Dr Rashmi Le recommendations prior to calling other doctors to ensure this was not caused from recent surgery  Advised the Dr Justino Peters is on call but will discuss with him and call back with recommendations  Patient repeats back understanding and agrees with plan  Spoke to Dr Justino Peters and made him aware  MD states he will call Patient to discuss

## 2021-01-26 NOTE — TELEPHONE ENCOUNTER
Patient states he has hemorrhoids and routinely will use finger to disimpact self if needed  Patient states tonight he tried and felt "2 finger-like protrustions" in rectal wall  Patient states no pain, no bleeding  Reason for Disposition   [1] Caller has NON-URGENT question AND [2] triager unable to answer question    Answer Assessment - Initial Assessment Questions  1  SYMPTOM: "What's the main symptom you're concerned about?" (e g , pain, fever, vomiting)      "I feel something in my rectum"  2  ONSET: "When did *No Answer*  start?"      1/25/21  3  SURGERY: "What surgery was performed?"      Robotic prostatectomy  4  DATE of SURGERY: "When was surgery performed?"       1/20/21  5  ANESTHESIA: " What type of anesthesia did you have?" (e g , general, spinal, epidural, local)      *No Answer*  6  PAIN: "Is there any pain?" If so, ask: "How bad is it?"  (Scale 1-10; or mild, moderate, severe)      no  7  FEVER: "Do you have a fever?" If so, ask: "What is your temperature, how was it measured, and when did it start?"      no  8  VOMITING: "Is there any vomiting?" If yes, ask: "How many times?"      no  9  BLEEDING: "Is there any bleeding?" If so, ask: "How much?" and "Where?"      no  10   OTHER SYMPTOMS: "Do you have any other symptoms?" (e g , drainage from wound, painful urination, constipation)        no    Protocols used: POST-OP SYMPTOMS AND QUESTIONS-Critical access hospital

## 2021-02-03 ENCOUNTER — OFFICE VISIT (OUTPATIENT)
Dept: UROLOGY | Facility: AMBULATORY SURGERY CENTER | Age: 66
End: 2021-02-03

## 2021-02-03 VITALS
BODY MASS INDEX: 27.09 KG/M2 | SYSTOLIC BLOOD PRESSURE: 118 MMHG | WEIGHT: 200 LBS | HEART RATE: 70 BPM | HEIGHT: 72 IN | DIASTOLIC BLOOD PRESSURE: 82 MMHG

## 2021-02-03 DIAGNOSIS — C61 PROSTATE CANCER (HCC): Primary | ICD-10-CM

## 2021-02-03 PROCEDURE — 99024 POSTOP FOLLOW-UP VISIT: CPT | Performed by: UROLOGY

## 2021-02-03 NOTE — PROGRESS NOTES
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  Extraprostatic extension is noted but all surgical margins are negative  Intraductal carcinoma is appreciated  Tumor involved 20% of the specimen with 60% noted to be pattern 4  There is no mention of the seminal vesicles, however, final pathology is listed as   PT 3A      his catheter was removed without difficulty  On examination his incisions are clean dry and intact and healing well  Impression:  [place] 4+3=7/10 prostate cancer status post robot assisted laparoscopic prostatectomy  Plan: The patient was instructed on Kegel exercises  He was referred for pelvic floor physical therapy  Follow-up in 6 weeks with a PSA  He wishes to hold on PDE5 inhibitors at this time  When he returns in follow-up we will address intracavernosal Tri Mix  The patient is amenable with this plan

## 2021-02-16 ENCOUNTER — TELEPHONE (OUTPATIENT)
Dept: UROLOGY | Facility: MEDICAL CENTER | Age: 66
End: 2021-02-16

## 2021-02-16 NOTE — TELEPHONE ENCOUNTER
Patient of Dr Taurus Cerrato seen in [place]  Patient was given medication by pcp for ED  Patient would like to speak to clinical in regards to this medication  Patient calling also because he also needs a 6 week follow up with psa with doctor  Please advise

## 2021-02-25 NOTE — TELEPHONE ENCOUNTER
Patient called stating he needs a second follow up appointment after his surgery in six weeks  He would like a call back with the appointment      Patient can be reached at [phone]

## 2021-03-04 ENCOUNTER — PROCEDURE VISIT (OUTPATIENT)
Dept: SURGERY | Facility: CLINIC | Age: 66
End: 2021-03-04
Payer: COMMERCIAL

## 2021-03-04 VITALS
RESPIRATION RATE: 18 BRPM | WEIGHT: 208 LBS | SYSTOLIC BLOOD PRESSURE: 120 MMHG | TEMPERATURE: 97.6 F | BODY MASS INDEX: 28.17 KG/M2 | HEART RATE: 90 BPM | DIASTOLIC BLOOD PRESSURE: 90 MMHG | HEIGHT: 72 IN

## 2021-03-04 DIAGNOSIS — C44.91 BASAL CELL CARCINOMA: Primary | ICD-10-CM

## 2021-03-04 PROCEDURE — 99212 OFFICE O/P EST SF 10 MIN: CPT | Performed by: SURGERY

## 2021-03-05 NOTE — TELEPHONE ENCOUNTER
Patient calling to get appointment  Patient states he needs to get back to work and needs to be seen  Please advise

## 2021-03-05 NOTE — TELEPHONE ENCOUNTER
Called patient and left message offering 3/9 @930 with [facility] in [facility]  Asked patient to call back and confirm   Office number given

## 2021-03-08 ENCOUNTER — LAB (OUTPATIENT)
Dept: LAB | Facility: HOSPITAL | Age: 66
End: 2021-03-08
Attending: UROLOGY
Payer: COMMERCIAL

## 2021-03-08 DIAGNOSIS — C61 PROSTATE CANCER (HCC): ICD-10-CM

## 2021-03-08 LAB — PSA SERPL-MCNC: 0.2 NG/ML (ref 0–4)

## 2021-03-08 PROCEDURE — 84153 ASSAY OF PSA TOTAL: CPT

## 2021-03-08 NOTE — TELEPHONE ENCOUNTER
Patient called and confirmed appointment for tomorrow 03/09 at 9 30 am with Dr Dara Tineo  He will go for his psa level today

## 2021-03-09 ENCOUNTER — OFFICE VISIT (OUTPATIENT)
Dept: UROLOGY | Facility: AMBULATORY SURGERY CENTER | Age: 66
End: 2021-03-09

## 2021-03-09 ENCOUNTER — TELEPHONE (OUTPATIENT)
Dept: UROLOGY | Facility: AMBULATORY SURGERY CENTER | Age: 66
End: 2021-03-09

## 2021-03-09 VITALS
HEIGHT: 72 IN | SYSTOLIC BLOOD PRESSURE: 122 MMHG | WEIGHT: 208 LBS | DIASTOLIC BLOOD PRESSURE: 82 MMHG | HEART RATE: 80 BPM | BODY MASS INDEX: 28.17 KG/M2

## 2021-03-09 DIAGNOSIS — C61 PROSTATE CANCER (HCC): Primary | ICD-10-CM

## 2021-03-09 PROCEDURE — 99024 POSTOP FOLLOW-UP VISIT: CPT | Performed by: UROLOGY

## 2021-03-09 NOTE — PROGRESS NOTES
Enmanuel Valerio is a 59-year-old male who underwent robot assisted laparoscopic prostatectomy in late January 2021 for Mario 7 prostate cancer  Final pathology revealed Mario 4 + 3 disease  There was extra prostatic extension noted  There was no evidence of seminal vesicle invasion or bladder neck involvement  All surgical margins were negative for disease  He was given a prescription for generic Cialis but has not used this postoperatively  His most recent PSA from March 8, 2021 is noted to be 0 2  Preoperative PSA was approximately 8  We discussed that perhaps he needs more time for the PSA to drop or there is possibly residual benign prostate tissue at the bladder neck  He states he is dry and wears no pads  On examination he is in no acute distress  His abdomen is soft nontender nondistended  Incisions are well healed at this time without evidence of hernia  Impression:  [place] 4+3=7/10 prostate cancer status post robot assisted laparoscopic prostatectomy, detectable PSA, erectile dysfunction      Plan: I recommend that he continue the Kegel exercises  I recommended timed voiding  He will return in 3 months with his next PSA level  In the interim I recommended using the generic Cialis 20 mg 1 hour prior to intercourse  He prefers to use the Cialis on a as needed basis as opposed to a daily basis  If the Cialis is not working for him he will call to discuss intracavernosal Tri Mix

## 2021-03-09 NOTE — TELEPHONE ENCOUNTER
Follow-up disposition: Return in about 3 months (around 6/9/2021) for 3 mos with FT with PSA   Patient wants to be called in about 2 months for his 3 month appointment

## 2021-04-05 RX ORDER — TADALAFIL 5 MG/1
5 TABLET ORAL DAILY PRN
COMMUNITY

## 2021-04-05 NOTE — PRE-PROCEDURE INSTRUCTIONS
Pre-Surgery Instructions:   Medication Instructions    pantoprazole (PROTONIX) 40 mg tablet Instructed patient per Anesthesia Guidelines   tadalafil (CIALIS) 5 MG tablet Instructed patient per Anesthesia Guidelines  Pre-op and bathing instructions given

## 2021-04-06 ENCOUNTER — HOSPITAL ENCOUNTER (OUTPATIENT)
Facility: HOSPITAL | Age: 66
Setting detail: OUTPATIENT SURGERY
Discharge: HOME | End: 2021-04-06
Attending: SURGERY | Admitting: SURGERY
Payer: COMMERCIAL

## 2021-04-06 VITALS
BODY MASS INDEX: 28.99 KG/M2 | HEART RATE: 67 BPM | DIASTOLIC BLOOD PRESSURE: 85 MMHG | HEIGHT: 72 IN | RESPIRATION RATE: 18 BRPM | WEIGHT: 214 LBS | OXYGEN SATURATION: 100 % | TEMPERATURE: 98.4 F | SYSTOLIC BLOOD PRESSURE: 154 MMHG

## 2021-04-06 DIAGNOSIS — L98.9 LESION OF FACE: ICD-10-CM

## 2021-04-06 DIAGNOSIS — C44.91 BASAL CELL CARCINOMA: ICD-10-CM

## 2021-04-06 PROCEDURE — 11643 EXC F/E/E/N/L MAL+MRG 2.1-3: CPT | Performed by: SURGERY

## 2021-04-06 PROCEDURE — 99024 POSTOP FOLLOW-UP VISIT: CPT | Performed by: SURGERY

## 2021-04-06 PROCEDURE — 88305 TISSUE EXAM BY PATHOLOGIST: CPT | Performed by: PATHOLOGY

## 2021-04-06 PROCEDURE — 12051 INTMD RPR FACE/MM 2.5 CM/<: CPT | Performed by: SURGERY

## 2021-04-06 PROCEDURE — 88331 PATH CONSLTJ SURG 1 BLK 1SPC: CPT | Performed by: PATHOLOGY

## 2021-04-06 PROCEDURE — NC001 PR NO CHARGE: Performed by: PHYSICIAN ASSISTANT

## 2021-04-06 PROCEDURE — 88332 PATH CONSLTJ SURG EA ADD BLK: CPT | Performed by: PATHOLOGY

## 2021-04-06 RX ORDER — MAGNESIUM HYDROXIDE 1200 MG/15ML
LIQUID ORAL AS NEEDED
Status: DISCONTINUED | OUTPATIENT
Start: 2021-04-06 | End: 2021-04-06 | Stop reason: HOSPADM

## 2021-04-06 RX ORDER — GINSENG 100 MG
CAPSULE ORAL AS NEEDED
Status: DISCONTINUED | OUTPATIENT
Start: 2021-04-06 | End: 2021-04-06 | Stop reason: HOSPADM

## 2021-04-06 NOTE — H&P
Progress Notes       Assessment/Plan:  Lesion right cheek  This looks most like a basal cell carcinoma  It will be removed with frozen section in a couple weeks time      Diagnoses and all orders for this visit:     Basal cell carcinoma  -     Case request operating room: excision facial lesion with frozen section  ; Standing  -     Case request operating room: excision facial lesion with frozen section        Other orders  -     Void On-Call to O R ; Standing            Subjective:      Patient ID: Tatianna Damon is a 72 y o  male       The patient is a 71-year-old male who has a 2 year history of a lesion on his right cheek  He thought this was a simple temple or is it but it is not gone away  It heals up and then some fluid comes out and it opens up again  He wishes to have it removed        Review of Systems   Most pertinent for recent robotic radical prostatectomy for carcinoma  History of GERD     Objective:      Physical Exam  Constitutional:       General: He is not in acute distress  Appearance: Normal appearance  He is not ill-appearing  HENT:      Head: Normocephalic and atraumatic  Eyes:      General: No scleral icterus  Conjunctiva/sclera: Conjunctivae normal    Neck:      Musculoskeletal: Normal range of motion and neck supple  Musculoskeletal: Normal range of motion  Lymphadenopathy:      Cervical: No cervical adenopathy  Skin:     General: Skin is warm and dry  Findings: Lesion present  Comments: The lesion is a pink raised 5 mm lesion a couple cm anterior to the tragus on the right side  It appears to have a cavity in the middle and most looks like a basal cell carcinoma   Neurological:      Mental Status: He is alert and oriented to person, place, and time  Psychiatric:         Mood and Affect: Mood normal          Behavior: Behavior normal          Thought Content:  Thought content normal          Judgment: Judgment normal     No change since the above

## 2021-04-06 NOTE — DISCHARGE INSTRUCTIONS
-Schedule appointment with Dr Kylah Tate for suture removal on Monday Massimo 12th, 2021    -You make take over the counter tyelnol or ibuprofen as needed  -Apply ice to the area  20 minutes on, 20 minutes off to reduce swelling and for pain relief as needed  -You may apply bacitracin ointment or neosporin to the sutures  -You may shower and let water run down, pat dry  -After scar fully heals, wear sunscreen while outside to prevent further scar formation

## 2021-04-06 NOTE — OP NOTE
OPERATIVE REPORT  PATIENT NAME: Bryant Wilson    :  1955  MRN: [id number]  Pt Location: [facility] OR ROOM 01    SURGERY DATE: 2021    Surgeon(s) and Role:     * Antonieta Meadows MD - Primary    Preop Diagnosis:  Basal cell carcinoma [C44 91]  Lesion of face [L98 9]    Post-Op Diagnosis Codes: * Basal cell carcinoma [C44 91]     * Lesion of face [L98 9]    Procedure(s) (LRB):  FACIAL LESION EXCISION WITH FROZEN SECTION AND MULTI LAYER CLOSURE  (Right)    Specimen(s):  ID Type Source Tests Collected by Time Destination   1 : FACIAL LESION RIGHT SIDE FOR FROZEN SHERRI AT 12 Tissue Lesion TISSUE EXAM Antonieta Meadows MD 2021 1412        Estimated Blood Loss:   Minimal    Drains:  Closed/Suction Drain Left Abdomen Bulb (Active)   Number of days: 76       Urethral Catheter Latex 20 Fr  (Active)   Number of days: 76       Anesthesia Type:   Local    Operative Indications:  Basal cell carcinoma [C44 91]  Lesion of face [L98 9]  Basal cell carcinoma    Operative Findings:  Basal cell carcinoma    Complications:   None    Procedure and Technique: The the patient was identified by me and placed in a left lateral decubitus position upon the operating room table  The area was now measured and marked  This was about a 0 7 x 0 7 basal cell carcinoma and the final excision was 2 2 x 1 2 cm  The area was prepped and then moved draped and then marked  1% neutralized lidocaine with epinephrine was infused as a local anesthetic  An elliptical skin incision is now made with a knife and continued with the Bovie  The mass is excised with a Bovie through the fat layer  And her flaps were now raised with the Bovie by undermining and then closure was interrupted Vicryl followed by running Prolene  This is a intermediate closure  We then waited for frozen section which was read out as basal cell carcinoma with clear margins  Bacitracin was now applied  Kristin ARNDT was the 1st assistant    She was necessary for retraction purposes with help with the closure    There was no resident available   I was present for the entire procedure    Patient Disposition:  PACU     SIGNATURE: Sarah Guerrier MD  DATE: April 6, 2021  TIME: 3:13 PM

## 2021-04-09 ENCOUNTER — TELEPHONE (OUTPATIENT)
Dept: SURGERY | Facility: CLINIC | Age: 66
End: 2021-04-09

## 2021-04-09 NOTE — TELEPHONE ENCOUNTER
Post procedure call from 4/6/21 at [facility] with Dr Brittany Mcmillan  LM for patient to call back      He needs to set up a post op appointment with Dr Brittany Mcmillan

## 2021-04-12 ENCOUNTER — OFFICE VISIT (OUTPATIENT)
Dept: SURGERY | Facility: CLINIC | Age: 66
End: 2021-04-12

## 2021-04-12 VITALS
SYSTOLIC BLOOD PRESSURE: 138 MMHG | HEART RATE: 96 BPM | BODY MASS INDEX: 29.26 KG/M2 | TEMPERATURE: 97.5 F | DIASTOLIC BLOOD PRESSURE: 80 MMHG | WEIGHT: 216 LBS | HEIGHT: 72 IN | RESPIRATION RATE: 18 BRPM

## 2021-04-12 DIAGNOSIS — C44.91 BASAL CELL CARCINOMA: Primary | ICD-10-CM

## 2021-04-12 PROCEDURE — 99024 POSTOP FOLLOW-UP VISIT: CPT | Performed by: SURGERY

## 2021-04-12 NOTE — PROGRESS NOTES
Postop after removal of a lesion from the right temple area  Turned out that this was a basal cell carcinoma as I expected  Final pathology showed that I cleared the lesion  In questioning the patient, he is fine    I removed the sutures and sent him on his way

## 2021-04-13 DIAGNOSIS — K21.00 GASTROESOPHAGEAL REFLUX DISEASE WITH ESOPHAGITIS, UNSPECIFIED WHETHER HEMORRHAGE: ICD-10-CM

## 2021-04-13 RX ORDER — PANTOPRAZOLE SODIUM 40 MG/1
TABLET, DELAYED RELEASE ORAL
Qty: 90 TABLET | Refills: 1 | Status: SHIPPED | OUTPATIENT
Start: 2021-04-13

## 2021-04-26 NOTE — PROGRESS NOTES
Assessment/Plan:  Lesion right cheek  This looks most like a basal cell carcinoma  It will be removed with frozen section in a couple weeks time     Diagnoses and all orders for this visit:    Basal cell carcinoma  -     Case request operating room: excision facial lesion with frozen section  ; Standing  -     Case request operating room: excision facial lesion with frozen section      Other orders  -     Void On-Call to O R ; Standing          Subjective:     Patient ID: Veronika Guardado is a 72 y o  male  The patient is a 59-year-old male who has a 2 year history of a lesion on his right cheek  He thought this was a simple temple or is it but it is not gone away  It heals up and then some fluid comes out and it opens up again  He wishes to have it removed      Review of Systems   Most pertinent for recent robotic radical prostatectomy for carcinoma  History of GERD    Objective:     Physical Exam  Constitutional:       General: He is not in acute distress  Appearance: Normal appearance  He is not ill-appearing  HENT:      Head: Normocephalic and atraumatic  Eyes:      General: No scleral icterus  Conjunctiva/sclera: Conjunctivae normal    Neck:      Musculoskeletal: Normal range of motion and neck supple  Musculoskeletal: Normal range of motion  Lymphadenopathy:      Cervical: No cervical adenopathy  Skin:     General: Skin is warm and dry  Findings: Lesion present  Comments: The lesion is a pink raised 5 mm lesion a couple cm anterior to the tragus on the right side  It appears to have a cavity in the middle and most looks like a basal cell carcinoma   Neurological:      Mental Status: He is alert and oriented to person, place, and time  Psychiatric:         Mood and Affect: Mood normal          Behavior: Behavior normal          Thought Content:  Thought content normal          Judgment: Judgment normal 
16
Normal

## 2021-05-13 ENCOUNTER — APPOINTMENT (OUTPATIENT)
Dept: LAB | Facility: HOSPITAL | Age: 66
End: 2021-05-13
Attending: UROLOGY
Payer: COMMERCIAL

## 2021-05-13 ENCOUNTER — TELEPHONE (OUTPATIENT)
Dept: UROLOGY | Facility: AMBULATORY SURGERY CENTER | Age: 66
End: 2021-05-13

## 2021-05-13 DIAGNOSIS — C61 PROSTATE CANCER (HCC): ICD-10-CM

## 2021-05-13 LAB — PSA SERPL-MCNC: 0.3 NG/ML (ref 0–4)

## 2021-05-13 PROCEDURE — 84153 ASSAY OF PSA TOTAL: CPT

## 2021-05-14 NOTE — TELEPHONE ENCOUNTER
PSA post op is 0 3  Please call patient (again) and stress the importance of follow up with me with o/v soon    Thx   FT

## 2021-05-17 NOTE — TELEPHONE ENCOUNTER
Spoke to Patient and scheduled for 5/18/2021 with Dr Ana De Guzman in [facility]  Patient repeats back appointment information and knows to call office with questions or concerns in the interim

## 2021-05-18 ENCOUNTER — OFFICE VISIT (OUTPATIENT)
Dept: UROLOGY | Facility: AMBULATORY SURGERY CENTER | Age: 66
End: 2021-05-18
Payer: COMMERCIAL

## 2021-05-18 VITALS
SYSTOLIC BLOOD PRESSURE: 140 MMHG | BODY MASS INDEX: 28.44 KG/M2 | HEIGHT: 72 IN | HEART RATE: 82 BPM | WEIGHT: 210 LBS | DIASTOLIC BLOOD PRESSURE: 70 MMHG

## 2021-05-18 DIAGNOSIS — C61 PROSTATE CANCER (HCC): Primary | ICD-10-CM

## 2021-05-18 DIAGNOSIS — N52.31 ERECTILE DYSFUNCTION AFTER RADICAL PROSTATECTOMY: ICD-10-CM

## 2021-05-18 PROCEDURE — 99214 OFFICE O/P EST MOD 30 MIN: CPT | Performed by: UROLOGY

## 2021-05-18 NOTE — PROGRESS NOTES
5/18/2021    Sari Monroe  1955  [id number]        Assessment   prostate cancer, status post robot assisted laparoscopic prostatectomy, detectable PSA, post prostatectomy erectile dysfunction      Discussion   with regards to his PSA which has increased from 0 2 up to 0 3, we will continue to monitor his PSA level  I reviewed that only prostate tissue generates PSA and that by definition there is microscopic prostate tissue within the pelvis  It is highly unlikely that this is metastatic disease based on his  Postoperative parameters including negative surgical margins, negative lymph nodes, and no evidence of seminal vesicle invasion  We did discuss that his T stage was T3a secondary to extraprostatic extension  We also reviewed that at the time of surgery there was diffuse cystitis cystica at the bladder neck and that multiple frozen sections were taken from the bladder neck which initially revealed benign prostate glands but ultimately margins were negative for prostate tissue  We discussed that it is possible that he has benign prostate glands remaining at the bladder neck which could be producing PSA  We discussed that if the PSA continues to rise that I would refer him to Radiation Oncology to discuss adjuvant radiation therapy  In the interim he wishes to proceed with intracavernosal Tri Mix  Tri Mix teaching will be arranged  A prescription was sent to [address] [place] [address]  The patient understands that he will need to bring his prescription with him to the office in a  Cooler bag  On the day of teaching  History of Present Illness  77 y o  male with a history of  Mario 7 prostate cancer  He underwent a robot assisted laparoscopic prostatectomy performed in January 2021  His initial preoperative PSA was 7 9  His 1st postoperative PSA was 0 2  He returns in follow-up today and his most recent PSA is 0 3    We had a lengthy discussion regarding the significance of a detectable PSA post prostatectomy  He has also been taking generic sildenafil without success  He inquires about intracavernosal Tri Mix  He is dry and wears no pads at this time  AUA Symptom Score  AUA SYMPTOM SCORE      Most Recent Value   AUA SYMPTOM SCORE   How often have you had a sensation of not emptying your bladder completely after you finished urinating? 1   How often have you had to urinate again less than two hours after you finished urinating? 1   How often have you found you stopped and started again several times when you urinate?  0   How often have you found it difficult to postpone urination? 0   How often have you had a weak urinary stream?  0   How often have you had to push or strain to begin urination? 0   How many times did you most typically get up to urinate from the time you went to bed at night until the time you got up in the morning? 1   Quality of Life: If you were to spend the rest of your life with your urinary condition just the way it is now, how would you feel about that?  3   AUA SYMPTOM SCORE  3          Review of Systems  Review of Systems   Constitutional: Negative  HENT: Negative  Eyes: Negative  Respiratory: Negative  Cardiovascular: Negative  Gastrointestinal: Negative  Endocrine: Negative  Genitourinary:        Per HPI   Musculoskeletal: Negative  Skin: Negative  Allergic/Immunologic: Negative  Neurological: Negative  Hematological: Negative  Psychiatric/Behavioral: Negative            Past Medical History  Past Medical History:   Diagnosis Date    Anal fissure     Basal cell carcinoma (BCC) of face     Cancer (HCC)     Colon polyp     Elevated PSA     GERD (gastroesophageal reflux disease)     Prostate cancer [facility])        Past Social History  Past Surgical History:   Procedure Laterality Date    [facility]COPY  05/2020    [facility]     EGD      HERNIA REPAIR      inguinal    WI BIOPSY OF PROSTATE,NEEDLE/PUNCH N/A 9/11/2020 Procedure: TRANSRECTAL NEEDLE BIOPSY PROSTATE (TRNBP); Surgeon: Cory Henry MD;  Location: [facility];  Service: Urology    CT EXC SKIN BENIG 0 6-1 CM FACE,FACIAL Right 2021    Procedure: FACIAL LESION EXCISION WITH FROZEN SECTION AND MULTI LAYER CLOSURE ;  Surgeon: Eb Burgos MD;  Location:  MAIN OR;  Service: General    CT LAP,PROSTATECTOMY,RADICAL,W/NERVE SPARE,INCL ROBOTIC N/A 2021    Procedure: Jonathan Reagan  W ROBOTICS;  Surgeon: Cory Henry MD;  Location: [facility];  Service: Urology    SPERMATOCELECTOMY  2018    right spermatocelectomy and multiple lipoma removal    US GUIDED PROSTATE BIOPSY  2020       Past Family History  Family History   Family history unknown: Yes       Past Social history  Social History     Socioeconomic History    Marital status: Single     Spouse name: Not on file    Number of children: Not on file    Years of education: Not on file    Highest education level: Not on file   Occupational History    Occupation: self employed   Social Needs    Financial resource strain: Not on file    Food insecurity     Worry: Not on file     Inability: Not on file   Kaliki needs     Medical: Not on file     Non-medical: Not on file   Tobacco Use    Smoking status: Former Smoker     Packs/day: 1 00     Years: 3 00     Pack years: 3 00     Quit date:      Years since quittin 4    Smokeless tobacco: Never Used   Substance and Sexual Activity    Alcohol use:  Yes     Alcohol/week: 10 0 standard drinks     Types: 10 Cans of beer per week     Frequency: 2-4 times a month     Drinks per session: 10 or more     Binge frequency: Weekly     Comment: Weekends    Drug use: Yes     Frequency: 2 0 times per week     Types: Marijuana     Comment: Weekends    Sexual activity: Yes     Partners: Female   Lifestyle    Physical activity     Days per week: Not on file     Minutes per session: Not on file    Stress: Not on file Relationships    Social connections     Talks on phone: Not on file     Gets together: Not on file     Attends Worship service: Not on file     Active member of club or organization: Not on file     Attends meetings of clubs or organizations: Not on file     Relationship status: Not on file    Intimate partner violence     Fear of current or ex partner: Not on file     Emotionally abused: Not on file     Physically abused: Not on file     Forced sexual activity: Not on file   Other Topics Concern    Not on file   Social History Narrative    Most recent tobacco use screenin2018    Live alone or with others: with others    Marital status: Single    Occupation: self employed    Are you currently employed: Yes    Alcohol intake: Occasional       Current Medications  Current Outpatient Medications   Medication Sig Dispense Refill    pantoprazole (PROTONIX) 40 mg tablet TAKE 1 TABLET BY MOUTH EVERY DAY 90 tablet 1    tadalafil (CIALIS) 5 MG tablet Take 5 mg by mouth daily as needed for erectile dysfunction      Bioflavonoid Products (STEPHANY C PO) Take 1 tablet by mouth daily      docusate sodium (COLACE) 100 mg capsule Take 1 capsule (100 mg total) by mouth 2 (two) times a day (Patient not taking: Reported on 3/4/2021) 30 capsule 0    oxybutynin (DITROPAN) 5 mg tablet Take 1 tablet (5 mg total) by mouth 3 (three) times a day as needed (Bladder spasms, catheterization discomfort) (Patient not taking: Reported on 3/4/2021) 30 tablet 0     No current facility-administered medications for this visit  Allergies  Allergies   Allergen Reactions    Other      DUST    Tylenol [Acetaminophen]      RINGING IN EARS       Past Medical History, Social History, Family History, medications and allergies were reviewed  Vitals  Vitals:    21 1521   BP: 140/70   Pulse: 82   Weight: 95 3 kg (210 lb)   Height: 6' (1 829 m)       Physical Exam  Physical Exam      On examination he is in no acute distress  Incisions are we will healed at this time  His abdomen is soft nontender nondistended  Skin is warm  Extremities without edema    Neurologic is grossly intact and nonfocal   Gait normal   Affect normal    Results  Lab Results   Component Value Date    PSA 0 3 05/13/2021    PSA 0 2 03/08/2021    PSA 7 9 (H) 10/13/2020     Lab Results   Component Value Date    CALCIUM 8 3 01/21/2021    K 4 4 01/21/2021    CO2 25 01/21/2021     01/21/2021    BUN 13 01/21/2021    CREATININE 1 01 01/21/2021     Lab Results   Component Value Date    WBC 18 16 (H) 01/21/2021    HGB 12 2 01/21/2021    HCT 38 1 01/21/2021    MCV 92 01/21/2021     01/21/2021         Office Urine Dip  No results found for this or any previous visit (from the past 1 hour(s)) ]

## 2021-05-20 ENCOUNTER — TELEPHONE (OUTPATIENT)
Dept: UROLOGY | Facility: MEDICAL CENTER | Age: 66
End: 2021-05-20

## 2021-05-20 NOTE — TELEPHONE ENCOUNTER
Patient of Dr Charo Franco in [place]    Patient called in regards to his Tri Mix injection  He saw the doctor on 05/18 and was confused on when to  the medication  He does not want to be responsible for the medication in the freezer until then  His appointment for Tri Mix Teaching is on 06/28/21at 11:30 am Spoke to Nati and she stated patient is to  Medication(Injection)  the day of appointment for him to bring to the office

## 2021-06-25 NOTE — TELEPHONE ENCOUNTER
Called Antony Butler back and he said he is unable to verify with [facility] that medication will be available for  @ 9:00 on Monday 6/28   Faxed note [phone] to [facility] to have them call patient and confirm

## 2021-06-25 NOTE — TELEPHONE ENCOUNTER
Patient called stating he wants to know if medicationTrimix is covered for his injection on Monday  Information given for him to  medication at [facility] before his appointment on Monday  He also wanted to know if he needed to get his psa level for this visit or he can do it for August when he is due for his yearly visit    Patient can be contacted at [phone] (h)

## 2021-06-28 ENCOUNTER — PROCEDURE VISIT (OUTPATIENT)
Dept: UROLOGY | Facility: AMBULATORY SURGERY CENTER | Age: 66
End: 2021-06-28
Payer: COMMERCIAL

## 2021-06-28 VITALS
BODY MASS INDEX: 27.09 KG/M2 | DIASTOLIC BLOOD PRESSURE: 62 MMHG | SYSTOLIC BLOOD PRESSURE: 118 MMHG | HEART RATE: 62 BPM | HEIGHT: 72 IN | WEIGHT: 200 LBS

## 2021-06-28 DIAGNOSIS — C61 PROSTATE CANCER (HCC): Primary | ICD-10-CM

## 2021-06-28 DIAGNOSIS — N52.31 ERECTILE DYSFUNCTION AFTER RADICAL PROSTATECTOMY: ICD-10-CM

## 2021-06-28 PROCEDURE — 99213 OFFICE O/P EST LOW 20 MIN: CPT | Performed by: NURSE PRACTITIONER

## 2021-06-28 NOTE — PROGRESS NOTES
6/28/2021  Eliu Mejia is a 77 y o  male    Assessment/Plan  Antony Butler was seen today for trimex  Diagnoses and all orders for this visit:    Prostate cancer [facility])    Erectile dysfunction after radical prostatectomy        Discussion  Antony Butler is a 77 y o  male being managed by Dr Brian Diaz  He was provided verbal demonstration on storage, disposal, titration, and administration of the medication  He was instructed on hospital precautions for a priapism  Patient refused to administer medication in the office today  He expressed that he is unsure if he will proceed with use of penile injections due to personal relations  Patient verbalized understanding regarding risks and administration  He will call our office with any questions  Patient will otherwise return in August 2021 with repeat PSA with Dr Brian Diaz  All questions were answered  History of Present Illness  59-year-old male with a history of [place] 7 prostate cancer  He underwent robotic assisted laparoscopic prostatectomy in January 2021  His initial postoperative PSA was 0 2, and repeat 0 3  Dr Brian Diaz discussed close observation of his PSA and possible referral to Radiation Oncology if he continues to rise  He developed postoperative erectile dysfunction, and presents today for Tri Mix teaching  Patient denies any urinary complaints, gross hematuria, dysuria, or incontinence

## 2021-08-16 ENCOUNTER — TELEPHONE (OUTPATIENT)
Dept: UROLOGY | Facility: AMBULATORY SURGERY CENTER | Age: 66
End: 2021-08-16

## 2021-08-16 NOTE — TELEPHONE ENCOUNTER
Working on recall list:    Patient to Return for Cancer surveillance, PSA August with Dr Chuck Engel  Nothing found in timeframe please assist in scheduling  Thank you!

## 2021-08-17 ENCOUNTER — TELEPHONE (OUTPATIENT)
Dept: UROLOGY | Facility: AMBULATORY SURGERY CENTER | Age: 66
End: 2021-08-17

## 2021-08-17 NOTE — TELEPHONE ENCOUNTER
Patient scheduled for 8/24/21 at 8:00 in the [facility] office with Dr Sofia Heath  Patient needs to have PSA done prior to appointment   Please call to confirm appointment

## 2021-08-17 NOTE — TELEPHONE ENCOUNTER
Working on recall list:    Patient of Dr Iam Nguyen  Return for Cancer surveillance, PSA August with Dr Iam Nguyen  [facility] No OV found   Please assist

## 2021-08-19 ENCOUNTER — APPOINTMENT (OUTPATIENT)
Dept: LAB | Facility: HOSPITAL | Age: 66
End: 2021-08-19
Attending: UROLOGY
Payer: COMMERCIAL

## 2021-08-19 DIAGNOSIS — C61 PROSTATE CANCER (HCC): ICD-10-CM

## 2021-08-19 LAB — PSA SERPL-MCNC: 0.4 NG/ML (ref 0–4)

## 2021-08-19 PROCEDURE — 84153 ASSAY OF PSA TOTAL: CPT

## 2021-08-24 ENCOUNTER — OFFICE VISIT (OUTPATIENT)
Dept: UROLOGY | Facility: AMBULATORY SURGERY CENTER | Age: 66
End: 2021-08-24
Payer: COMMERCIAL

## 2021-08-24 VITALS
HEART RATE: 64 BPM | SYSTOLIC BLOOD PRESSURE: 120 MMHG | BODY MASS INDEX: 26.28 KG/M2 | HEIGHT: 72 IN | DIASTOLIC BLOOD PRESSURE: 76 MMHG | WEIGHT: 194 LBS

## 2021-08-24 DIAGNOSIS — C61 PROSTATE CANCER (HCC): Primary | ICD-10-CM

## 2021-08-24 PROCEDURE — 99214 OFFICE O/P EST MOD 30 MIN: CPT | Performed by: UROLOGY

## 2021-08-24 NOTE — PROGRESS NOTES
8/24/2021    Keli Cee  1955  [id number]        Assessment    [place] 7 prostate cancer, status post robot assisted laparoscopic prostatectomy, detectable PSA 0 4      Discussion   I discussed again with the patient that his PSA is detectable at 0 4 indicative of persistent prostate tissue  We again discussed that this could either be benign or cancerous tissue  Based on my operative report multiple frozen sections were taken at the bladder neck secondary to cystitis cystica appreciated at the time of surgery  At this time I recommend follow-up in 3 months with his next PSA  We discussed that if the PSA continues to rise in approaches 1 that the next step would be to consider adjuvant radiation therapy  At this time the patient is comfortable with observation I feel that this is reasonable  Continue with intracavernosal Tri Mix for his erectile dysfunction  History of Present Illness  77 y o  male with a history of Mario 7 prostate cancer  He underwent a robot assisted laparoscopic prostatectomy performed in January 2021  His initial preoperative PSA was 7 9  His 1st postoperative PSA was 0 2  More recently his PSA deirdre to 0 3in May 2021  his most recent PSA from August 19, 2021 is noted to be 0 4  We again discussed the significance of a detectable PSA post prostatectomy which is indicative of the presence of prostate tissue  We discussed that this could potentially be benign versus prostate cancer tissue  I reviewed my operative note stating the multiple frozen sections were obtained at the bladder neck until no identifiable prostate tissue was identified  This could be indicative of residual benign prostate tissue at the bladder neck  He is dry and wears no pads  He has a prescription for intracavernosal Tri Mix          AUA Symptom Score  AUA SYMPTOM SCORE      Most Recent Value   AUA SYMPTOM SCORE   How often have you had a sensation of not emptying your bladder completely after you finished urinating? 0   How often have you had to urinate again less than two hours after you finished urinating? 0   How often have you found you stopped and started again several times when you urinate?  0   How often have you found it difficult to postpone urination? 0   How often have you had a weak urinary stream?  0   How often have you had to push or strain to begin urination? 0   How many times did you most typically get up to urinate from the time you went to bed at night until the time you got up in the morning? 1   Quality of Life: If you were to spend the rest of your life with your urinary condition just the way it is now, how would you feel about that?  3   AUA SYMPTOM SCORE  1          Review of Systems  Review of Systems   Constitutional: Negative  HENT: Negative  Eyes: Negative  Respiratory: Negative  Cardiovascular: Negative  Gastrointestinal: Negative  Endocrine: Negative  Genitourinary:        Per HPI   Musculoskeletal: Negative  Skin: Negative  Allergic/Immunologic: Negative  Neurological: Negative  Hematological: Negative  Psychiatric/Behavioral: Negative  Past Medical History  Past Medical History:   Diagnosis Date    Anal fissure     Basal cell carcinoma (BCC) of face     Cancer (HCC)     Colon polyp     Elevated PSA     GERD (gastroesophageal reflux disease)     Prostate cancer [facility])        Past Social History  Past Surgical History:   Procedure Laterality Date    COLON[facility]COPY  05/2020    [facility]     EGD      HERNIA REPAIR      inguinal    TX BIOPSY OF PROSTATE,NEEDLE/PUNCH N/A 9/11/2020    Procedure: TRANSRECTAL NEEDLE BIOPSY PROSTATE (TRNBP);   Surgeon: Lala Barraza MD;  Location:  MAIN OR;  Service: Urology    TX EXC SKIN BENIG 0 6-1 CM FACE,FACIAL Right 4/6/2021    Procedure: FACIAL LESION EXCISION WITH FROZEN SECTION AND MULTI LAYER CLOSURE ;  Surgeon: Camryn Hernandez MD;  Location:  MAIN OR;  Service: General  HI LAP,PROSTATECTOMY,RADICAL,W/NERVE SPARE,INCL ROBOTIC N/A 2021    Procedure: PROSTATECTOMY RADICAL LAPAROSCOPIC  W ROBOTICS;  Surgeon: Dimitry Velázquez MD;  Location: [facility]IN [facility];  Service: Urology    SPERMATOCELECTOMY  2018    right spermatocelectomy and multiple lipoma removal    US GUIDED PROSTATE BIOPSY  2020       Past Family History  Family History   Family history unknown: Yes       Past Social history  Social History     Socioeconomic History    Marital status: Single     Spouse name: Not on file    Number of children: Not on file    Years of education: Not on file    Highest education level: Not on file   Occupational History    Occupation: self employed   Tobacco Use    Smoking status: Former Smoker     Packs/day: 1 00     Years: 3 00     Pack years: 3 00     Quit date:      Years since quittin 6    Smokeless tobacco: Never Used   Vaping Use    Vaping Use: Never used   Substance and Sexual Activity    Alcohol use: Yes     Alcohol/week: 10 0 standard drinks     Types: 10 Cans of beer per week     Comment: Weekends    Drug use: Yes     Frequency: 2 0 times per week     Types: Marijuana     Comment: Weekends    Sexual activity: Yes     Partners: Female   Other Topics Concern    Not on file   Social History Narrative    Most recent tobacco use screenin2018    Live alone or with others: with others    Marital status: Single    Occupation: self employed    Are you currently employed: Yes    Alcohol intake: Occasional     Social Determinants of Health     Financial Resource Strain:     Difficulty of Paying Living Expenses:    Food Insecurity:     Worried About Running Out of Food in the Last Year:     [address] in the Last Year:    Transportation Needs:     Lack of Transportation (Medical):      Lack of Transportation (Non-Medical):    Physical Activity:     Days of Exercise per Week:     Minutes of Exercise per Session:    Stress:     Feeling of Stress :    Social Connections:     Frequency of Communication with Friends and Family:     Frequency of Social Gatherings with Friends and Family:     Attends [employer] Services:     Active Member of Clubs or Organizations:     Attends Club or Organization Meetings:     Marital Status:    Intimate Partner Violence:     Fear of Current or Ex-Partner:     Emotionally Abused:     Physically Abused:     Sexually Abused:        Current Medications  Current Outpatient Medications   Medication Sig Dispense Refill    pantoprazole (PROTONIX) 40 mg tablet TAKE 1 TABLET BY MOUTH EVERY DAY 90 tablet 1    Bioflavonoid Products (STEPHANY C PO) Take 1 tablet by mouth daily (Patient not taking: Reported on 6/28/2021)      docusate sodium (COLACE) 100 mg capsule Take 1 capsule (100 mg total) by mouth 2 (two) times a day (Patient not taking: Reported on 3/4/2021) 30 capsule 0    oxybutynin (DITROPAN) 5 mg tablet Take 1 tablet (5 mg total) by mouth 3 (three) times a day as needed (Bladder spasms, catheterization discomfort) (Patient not taking: Reported on 3/4/2021) 30 tablet 0    tadalafil (CIALIS) 5 MG tablet Take 5 mg by mouth daily as needed for erectile dysfunction (Patient not taking: Reported on 6/28/2021)       No current facility-administered medications for this visit  Allergies  Allergies   Allergen Reactions    Other      DUST    Tylenol [Acetaminophen]      RINGING IN EARS       Past Medical History, Social History, Family History, medications and allergies were reviewed  Vitals  Vitals:    08/24/21 0803   BP: 120/76   BP Location: Left arm   Patient Position: Sitting   Cuff Size: Adult   Pulse: 64   Weight: 88 kg (194 lb)   Height: 6' (1 829 m)       Physical Exam  Physical Exam    On examination he is in no acute distress  His abdomen is soft nontender nondistended  Surgical scars are well healed without evidence of herniation    Phallus, scrotum and scrotal contents are normal   There is no leak with cough  Skin is warm  Extremities without edema    Neurologic is grossly intact and nonfocal   Gait normal   Affect normal      Results  Lab Results   Component Value Date    PSA 0 4 08/19/2021    PSA 0 3 05/13/2021    PSA 0 2 03/08/2021     Lab Results   Component Value Date    CALCIUM 8 3 01/21/2021    K 4 4 01/21/2021    CO2 25 01/21/2021     01/21/2021    BUN 13 01/21/2021    CREATININE 1 01 01/21/2021     Lab Results   Component Value Date    WBC 18 16 (H) 01/21/2021    HGB 12 2 01/21/2021    HCT 38 1 01/21/2021    MCV 92 01/21/2021     01/21/2021         Office Urine Dip  No results found for this or any previous visit (from the past 1 hour(s)) ]

## 2021-12-08 ENCOUNTER — APPOINTMENT (OUTPATIENT)
Dept: LAB | Facility: HOSPITAL | Age: 66
End: 2021-12-08
Attending: UROLOGY
Payer: COMMERCIAL

## 2021-12-08 DIAGNOSIS — C61 PROSTATE CANCER (HCC): ICD-10-CM

## 2021-12-08 PROCEDURE — 84153 ASSAY OF PSA TOTAL: CPT

## 2021-12-09 ENCOUNTER — OFFICE VISIT (OUTPATIENT)
Dept: UROLOGY | Facility: AMBULATORY SURGERY CENTER | Age: 66
End: 2021-12-09
Payer: COMMERCIAL

## 2021-12-09 VITALS
DIASTOLIC BLOOD PRESSURE: 100 MMHG | WEIGHT: 203 LBS | HEART RATE: 72 BPM | BODY MASS INDEX: 28.42 KG/M2 | HEIGHT: 71 IN | SYSTOLIC BLOOD PRESSURE: 140 MMHG

## 2021-12-09 DIAGNOSIS — C61 PROSTATE CANCER (HCC): Primary | ICD-10-CM

## 2021-12-09 LAB — PSA SERPL-MCNC: 0.4 NG/ML (ref 0–4)

## 2021-12-09 PROCEDURE — 99214 OFFICE O/P EST MOD 30 MIN: CPT | Performed by: UROLOGY

## 2022-01-06 ENCOUNTER — CLINICAL SUPPORT (OUTPATIENT)
Dept: RADIATION ONCOLOGY | Facility: HOSPITAL | Age: 67
End: 2022-01-06
Attending: RADIOLOGY
Payer: COMMERCIAL

## 2022-01-06 VITALS
RESPIRATION RATE: 16 BRPM | BODY MASS INDEX: 28.19 KG/M2 | OXYGEN SATURATION: 96 % | HEART RATE: 78 BPM | SYSTOLIC BLOOD PRESSURE: 122 MMHG | DIASTOLIC BLOOD PRESSURE: 80 MMHG | TEMPERATURE: 98 F | HEIGHT: 71 IN | WEIGHT: 201.4 LBS

## 2022-01-06 DIAGNOSIS — C61 PROSTATE CANCER (HCC): ICD-10-CM

## 2022-01-06 DIAGNOSIS — C61 PROSTATE CANCER (HCC): Primary | ICD-10-CM

## 2022-01-06 PROCEDURE — 99211 OFF/OP EST MAY X REQ PHY/QHP: CPT | Performed by: RADIOLOGY

## 2022-01-06 PROCEDURE — 99204 OFFICE O/P NEW MOD 45 MIN: CPT | Performed by: RADIOLOGY

## 2022-01-06 PROCEDURE — G0463 HOSPITAL OUTPT CLINIC VISIT: HCPCS | Performed by: RADIOLOGY

## 2022-01-06 NOTE — PROGRESS NOTES
Eliu Mejia 1955 is a 77 y o  male     Patient is a 77 y o male with Mario 4+3=7 prostate cancer  He presents today for radiation oncology consult  Referred by Dr Brian Diaz  He presented with elevated PSA, noted to be elevated at just below 10 in May 2020  He had a prostate biopsy performed September 2020 which revealed 4/12 cores positive with Mario 4+3=7  He opted to receive surgery  For preoperative planning he had a CT scan which showed a suspicious lesion in the bone of the pelvis  A follow-up bone scan was negative for metastatic disease  He had a prostatectomy 1/20/21  Pathology revealed Mario 4+3=7 prostate cancer  Small extra prostatic extension noted, no evidence of seminal vesicle invasion or bladder neck involvement, margins and lymph nodes were negative  He has been following with Dr Brian Diaz  Postoperatively his PSA increased from 0 2, to 0 3, to 0 4  PSA's  5/7/20   9 84    Component      Latest Ref Rng & Units 10/13/2020 3/8/2021 5/13/2021 8/19/2021   PSA, Total      0 0 - 4 0 ng/mL 7 9 (H) 0 2 0 3 0 4     Component      Latest Ref Rng & Units 12/8/2021   PSA, Total      0 0 - 4 0 ng/mL 0 4       9/11/20 prostate biopsy    1/20/21 PROSTATECTOMY RADICAL LAPAROSCOPIC  W ROBOTICS   Operative Findings:  Challenging prostatectomy secondary to the recess nature of the prostate beneath the pubic bone  Multiple frozen sections obtained at bladder neck until no prostatic tissue identified  Bilateral non nerve spare approach secondary to tight pelvis  bilateral pelvic lymph node dissection  Pathology:   A  Urinary Bladder, Anterior bladder neck, Excision:  - Cystitis cystica and benign prostatic glands  B  Urinary Bladder, Posterior bladder neck, Excision:  - Cystitis cystica and benign prostatic glands  C  Urinary Bladder, Posterior bladder neck, Excision:  - Cystitis cystica  D  Urinary Bladder, Posterior bladder neck, Excision:  - Cystitis cystica       E  Urinary Bladder, Anterior bladder neck, Excision:  - Cystitis cystica  F  Urinary Bladder, Anterior bladder neck, Excision:  - Cystitis cystica  G  Lymph Node, Left pelvic lymph node, Excision:  - One reactive lymph node (0/1), negative for carcinoma, supported by CK-AE1/3 immunostain  H  Lymph Node, Right pelvic lymph node, Excision:  - Benign fibroadipose tissue  I  Urinary Bladder, Posterior bladder neck part 3, Excision:  - Cystitis cystica  J  Urinary Bladder, Posterior bladder neck part 3, Excision[de-identified]  - Cystitis cystica  K  Urinary Bladder, Anterior bladder neck, Excision:  - Cystitis cystica  L  Prostate, Prostatectomy:  - Prostatic adenocarcinoma, [place] score 4+3=7, Prognostic Grade Group 3   * Tumor quantitation: 20%   * 60% Flow[place] pattern 4   * Intraductal carcinoma: Present   * Extraprostatic Extension: Present, focally at left posterior   * Resection margins: Negative for carcinoma   * Perineural invasion: Present   * Lymphovascular Invasion: Not Identified, supported by D2-40 and CD31 immunostain   * Pathologic stage (AJCC, 8th ed ): pT3a, pN0, see synoptic report  M  Urinary Bladder, Anterior bladder neck, Excision:  - Benign fibroadipose tissue      SPECIMEN   Procedure  Radical prostatectomy    Prostate Size     Prostate Weight (g)  50 g   Prostate Greatest Dimension (Centimeters)  5 7 cm   Additional Dimension (Centimeters)  3 8 cm     3 5 cm   TUMOR   Histologic Type  Acinar adenocarcinoma    Histologic Grade     Grade Group and Flowery Branch Score  Grade group 3 (Flow[place] Score 4 + 3 = 7)    Percentage of Pattern 4  60 %   Intraductal Carcinoma (IDC)  Present    Tumor Quantitation  Estimated percentage of prostate involved by tumor: 20 %   Extraprostatic Extension (EPE)  Present, focal    Location of Extraprostatic Extension  Left posterior    Urinary Bladder Neck Invasion  Not identified    Seminal Vesicle Invasion  Not identified    Treatment Effect  No known presurgical therapy    Lymphovascular Invasion  Not Identified    Perineural Invasion  Present    MARGINS   Margins  Uninvolved by invasive carcinoma    LYMPH NODES   Number of Lymph Nodes Involved  0    Number of Lymph Nodes Examined  1    PATHOLOGIC STAGE CLASSIFICATION (pTNM, AJCC 8th Edition)   Primary Tumor (pT)  pT3a    Regional Lymph Nodes (pN)  pN0              10/6/21 CT abdomen pelvis-   -14 mm mixed sclerotic and lytic lesion at the right iliac bone  No prior studies are available for comparison  Findings there is a suspicion for metastatic disease  A whole body bone scan is recommended for further evaluation    -No acute intra-abdominal abnormality  No free air or free fluid   -No intra-abdominal or pelvic lymphadenopathy  10/17/21 bone scan-    1  Healing fracture of the left 7th costochondral junction  No scintigraphic evidence of osseous metastasis  Continued CT follow-up recommended for right iliac bone lesion  12/9/21 Dr Sylvia Nettles- Discussed PSA 0 4  which is stable  He has prostate tissue left behind, question whether this is benign or cancerous  Refer to rad onc to discuss risk and benefits of RT  Hold ADT at this time  F/u 3 months with PSA  He is dry and wears no pads  He struggles with erectile dysfunction  He has not been using his prescribed intracavernosal Tri Mix  Upcoming:   3/11/22 Dr Sylvia Nettles      Oncology History   Prostate cancer [facility])   9/11/2020 Initial Diagnosis    Prostate cancer ([facility] [address] )     9/11/2020 Biopsy    Final Diagnosis   A  Prostate, RPZ, core needle biopsies:       - Focal high-grade pin        - No prostatic adenocarcinoma is identified  B  Prostate, RCZ, core needle biopsies:       - Focal high-grade pin        - No prostatic adenocarcinoma is identified       C  Prostate, BROOKS, core needle biopsies:       - Prostatic adenocarcinoma, [place] score 4+3=7, Prognostic Grade Group III, discontinuously involving 2 of 3 cores, (90%, 70%)       - 60% Mario pattern 4        - No perineural invasion, lymphovascular invasion or extraprostatic extension is identified  D  Prostate, LCZ, core needle biopsies:       - Prostatic adenocarcinoma, [place] score 4+3=7, Prognostic Grade Group III, involving 2 of 3 core biopsies, (80%, <5%)  - 80% [place] pattern 4        - Intraductal carcinoma is identified        - No perineural invasion, lymphovascular invasion or extraprostatic extension is identified  1/20/2021 Surgery    Prostatectomy    Final Diagnosis   A  Urinary Bladder, Anterior bladder neck, Excision:  - Cystitis cystica and benign prostatic glands  B  Urinary Bladder, Posterior bladder neck, Excision:  - Cystitis cystica and benign prostatic glands  C  Urinary Bladder, Posterior bladder neck, Excision:  - Cystitis cystica  D  Urinary Bladder, Posterior bladder neck, Excision:  - Cystitis cystica  E  Urinary Bladder, Anterior bladder neck, Excision:  - Cystitis cystica  F  Urinary Bladder, Anterior bladder neck, Excision:  - Cystitis cystica  G  Lymph Node, Left pelvic lymph node, Excision:  - One reactive lymph node (0/1), negative for carcinoma, supported by CK-AE1/3 immunostain  H  Lymph Node, Right pelvic lymph node, Excision:  - Benign fibroadipose tissue  I  Urinary Bladder, Posterior bladder neck part 3, Excision:  - Cystitis cystica  J  Urinary Bladder, Posterior bladder neck part 3, Excision[de-identified]  - Cystitis cystica  K  Urinary Bladder, Anterior bladder neck, Excision:  - Cystitis cystica       L  Prostate, Prostatectomy:  - Prostatic adenocarcinoma, Mario score 4+3=7, Prognostic Grade Group 3   * Tumor quantitation: 20%   * 60% Mario pattern 4   * Intraductal carcinoma: Present   * Extraprostatic Extension: Present, focally at left posterior   * Resection margins: Negative for carcinoma   * Perineural invasion: Present   * Lymphovascular Invasion: Not Identified, supported by D2-40 and CD31 immunostain * Pathologic stage (AJCC, 8th ed ): pT3a, pN0, see synoptic report  M  Urinary Bladder, Anterior bladder neck, Excision:  - Benign fibroadipose tissue  Review of Systems:  Review of Systems   Constitutional: Negative  Negative for appetite change, fatigue, fever and unexpected weight change  HENT: Negative  Eyes: Negative  Respiratory: Negative  Negative for cough and shortness of breath  Cardiovascular: Negative  Gastrointestinal: Negative  Negative for abdominal pain, diarrhea and nausea  Endocrine: Negative  Genitourinary: Negative  Negative for dysuria  Nocturia x1  Occasional leaking with urgency  Musculoskeletal: Negative  Negative for gait problem  Skin: Negative  Allergic/Immunologic: Positive for environmental allergies (dust)  Neurological: Negative  Negative for dizziness, weakness, light-headedness and numbness  Hematological: Negative  Does not bruise/bleed easily  Psychiatric/Behavioral: Negative  Clinical Trial: no    IPSS Questionnaire (AUA-7): Over the past month    1)  How often have you had a sensation of not emptying your bladder completely after you finish urinating? 0 - Not at all   2)  How often have you had to urinate again less than two hours after you finished urinating? 0 - Not at all   3)  How often have you found you stopped and started again several times when you urinated? 0 - Not at all   4) How difficult have you found it to postpone urination? 0 - Not at all   5) How often have you had a weak urinary stream?  0 - Not at all   6) How often have you had to push or strain to begin urination? 0 - Not at all   7) How many times did you most typically get up to urinate from the time you went to bed until the time you got up in the morning? 1 - 1 time   Total Score:  1       Pain assessment: 0    PFT: na    Imaging:No images are attached to the encounter       Prior Radiation: no    Teaching: NCI RT packet given, RT for prostate bed/pelvis reviewed with patient    MST: completed    Implantable Devices (Port, pacemaker, pain stimulator): no     Hip Replacement: no    Covid Vaccine Status: no    Health Maintenance   Topic Date Due    Hepatitis C Screening  Never done    Medicare Annual Wellness Visit (AWV)  Never done    COVID-19 Vaccine (1) Never done    Depression Screening  Never done    BMI: Followup Plan  Never done    DTaP,Tdap,and Td Vaccines (1 - Tdap) Never done    Colorectal Cancer Screening  Never done    Fall Risk  Never done    Pneumococcal Vaccine: 65+ Years (1 of 1 - PPSV23) Never done    Influenza Vaccine (1) Never done    BMI: Adult  12/09/2022    HIB Vaccine  Aged Out    Hepatitis B Vaccine  Aged Out    IPV Vaccine  Aged Out    Hepatitis A Vaccine  Aged Out    Meningococcal ACWY Vaccine  Aged Out    HPV Vaccine  Aged Out       Past Medical History:   Diagnosis Date    Anal fissure     Basal cell carcinoma (BCC) of face     Cancer ([address] )     Colon polyp     Elevated PSA     GERD (gastroesophageal reflux disease)     Prostate cancer [facility])        Past Surgical History:   Procedure Laterality Date    [facility]     EGD      HERNIA REPAIR      inguinal    AL BIOPSY OF PROSTATE,NEEDLE/PUNCH N/A 9/11/2020    Procedure: TRANSRECTAL NEEDLE BIOPSY PROSTATE (TRNBP);   Surgeon: Mayte Walker MD;  Location: [facility];  Service: Urology    AL EXC SKIN BENIG 0 6-1 CM FACE,FACIAL Right 4/6/2021    Procedure: FACIAL LESION EXCISION WITH FROZEN SECTION AND MULTI LAYER CLOSURE ;  Surgeon: Keya Sen MD;  Location:  MAIN OR;  Service: General    AL LAP,PROSTATECTOMY,RADICAL,W/NERVE SPARE,INCL ROBOTIC N/A 1/20/2021    Procedure: Cari PLASCENCIA ROBOTICS;  Surgeon: Mayte Walker MD;  Location: [facility] OR;  Service: Urology    SPERMATOCELECTOMY  02/2018    right spermatocelectomy and multiple lipoma removal    US GUIDED PROSTATE BIOPSY 2020       Family History   Family history unknown: Yes       Social History     Tobacco Use    Smoking status: Former Smoker     Packs/day: 1 00     Years: 3 00     Pack years: 3 00     Quit date: 1973     Years since quittin 0    Smokeless tobacco: Never Used   Vaping Use    Vaping Use: Never used   Substance Use Topics    Alcohol use:  Yes     Alcohol/week: 10 0 standard drinks     Types: 10 Cans of beer per week     Comment: Weekends    Drug use: Yes     Frequency: 2 0 times per week     Types: Marijuana     Comment: Weekends          Current Outpatient Medications:     Bioflavonoid Products (STEPHANY C PO), Take 1 tablet by mouth daily (Patient not taking: Reported on 2021), Disp: , Rfl:     docusate sodium (COLACE) 100 mg capsule, Take 1 capsule (100 mg total) by mouth 2 (two) times a day (Patient not taking: Reported on 3/4/2021), Disp: 30 capsule, Rfl: 0    oxybutynin (DITROPAN) 5 mg tablet, Take 1 tablet (5 mg total) by mouth 3 (three) times a day as needed (Bladder spasms, catheterization discomfort) (Patient not taking: Reported on 3/4/2021), Disp: 30 tablet, Rfl: 0    pantoprazole (PROTONIX) 40 mg tablet, TAKE 1 TABLET BY MOUTH EVERY DAY, Disp: 90 tablet, Rfl: 1    tadalafil (CIALIS) 5 MG tablet, Take 5 mg by mouth daily as needed for erectile dysfunction (Patient not taking: Reported on 2021), Disp: , Rfl:     Allergies   Allergen Reactions    Other      DUST    Tylenol [Acetaminophen]      RINGING IN EARS        Vitals:    22 0951   Height: 5' 11" (1 803 m)

## 2022-01-06 NOTE — PROGRESS NOTES
Consultation - Radiation Oncology      YXW:[id number] : 1955  Encounter: [id number]  Patient Information: [address]  Chief Complaint   Patient presents with    Consult     Cancer Staging  No matching staging information was found for the patient  History of Present Illness     Patient is a 77 y o male with [place] 4+3=7 prostate cancer  He presents today for radiation oncology consult  Referred by Dr Alanna Flowers      He presented with elevated PSA, noted to be elevated at just below 10 in May 2020  He had a prostate biopsy performed 2020 which revealed 4/12 cores positive with Mario 4+3=7  He opted to receive surgery  For preoperative planning he had a CT scan which showed a suspicious lesion in the bone of the pelvis   A follow-up bone scan was negative for metastatic disease       He had a prostatectomy 21  Pathology revealed Mario 4+3=7 prostate cancer  Small extra prostatic extension noted, no evidence of seminal vesicle invasion or bladder neck involvement, margins and lymph nodes were negative  He has been following with Dr Alanna Flowers  Postoperatively his PSA increased from 0 2, to 0 3, to 0 4       PSA's  20   9 84     Component      Latest Ref Rng & Units 10/13/2020 3/8/2021 2021 2021   PSA, Total      0 0 - 4 0 ng/mL 7 9 (H) 0 2 0 3 0 4      Component      Latest Ref Rng & Units 2021   PSA, Total      0 0 - 4 0 ng/mL 0 4         20 prostate biopsy     21 PROSTATECTOMY RADICAL LAPAROSCOPIC  W ROBOTICS   Operative Findings:  Challenging prostatectomy secondary to the recess nature of the prostate beneath the pubic bone   Multiple frozen sections obtained at bladder neck until no prostatic tissue identified   Bilateral non nerve spare approach secondary to tight pelvis   bilateral pelvic lymph node dissection      Pathology:   A   Urinary Bladder, Anterior bladder neck, Excision:  - Cystitis cystica and benign prostatic glands      B  Urinary Bladder, Posterior bladder neck, Excision:  - Cystitis cystica and benign prostatic glands       C  Urinary Bladder, Posterior bladder neck, Excision:  - Cystitis cystica      D  Urinary Bladder, Posterior bladder neck, Excision:  - Cystitis cystica      E  Urinary Bladder, Anterior bladder neck, Excision:  - Cystitis cystica      F  Urinary Bladder, Anterior bladder neck, Excision:  - Cystitis cystica      G  Lymph Node, Left pelvic lymph node, Excision:  - One reactive lymph node (0/1), negative for carcinoma, supported by CK-AE1/3 immunostain      H  Lymph Node, Right pelvic lymph node, Excision:  - Benign fibroadipose tissue      I  Urinary Bladder, Posterior bladder neck part 3, Excision:  - Cystitis cystica      J  Urinary Bladder, Posterior bladder neck part 3, Excision[de-identified]  - Cystitis cystica      K  Urinary Bladder, Anterior bladder neck, Excision:  - Cystitis cystica      L  Prostate, Prostatectomy:  - Prostatic adenocarcinoma, [place] score 4+3=7, Prognostic Grade Group 3   * Tumor quantitation: 20%   * 60% Mario pattern 4   * Intraductal carcinoma: Present   * Extraprostatic Extension: Present, focally at left posterior   * Resection margins: Negative for carcinoma   * Perineural invasion: Present   * Lymphovascular Invasion: Not Identified, supported by D2-40 and CD31 immunostain   * Pathologic stage (AJCC, 8th ed ): pT3a, pN0, see synoptic report      M   Urinary Bladder, Anterior bladder neck, Excision:  - Benign fibroadipose tissue      10/6/20 CT abdomen pelvis-   -14 mm mixed sclerotic and lytic lesion at the right iliac bone   No prior studies are available for comparison   Findings there is a suspicion for metastatic disease   A whole body bone scan is recommended for further evaluation    -No acute intra-abdominal abnormality   No free air or free fluid   -No intra-abdominal or pelvic lymphadenopathy      10/17/20 bone scan-    1   Healing fracture of the left 7th costochondral junction   No scintigraphic evidence of osseous metastasis   Continued CT follow-up recommended for right iliac bone lesion         12/9/21 Dr Jacob Russ- Discussed PSA 0 4  which is stable  He has prostate tissue left behind, question whether this is benign or cancerous  Refer to rad onc to discuss risk and benefits of RT  Hold ADT at this time  F/u 3 months with PSA  He is dry and wears no pads   He struggles with erectile dysfunction  Zilphia Holiday has not been using his prescribed intracavernosal Tri Mix         Upcoming:   3/11/22 Dr Wesley Stafford   Oncology History   Prostate cancer [facility])   9/11/2020 Initial Diagnosis    Prostate cancer ([facility] [address] )     9/11/2020 Biopsy    Final Diagnosis   A  Prostate, RPZ, core needle biopsies:       - Focal high-grade pin        - No prostatic adenocarcinoma is identified  B  Prostate, RCZ, core needle biopsies:       - Focal high-grade pin        - No prostatic adenocarcinoma is identified  C  Prostate, BROOKS, core needle biopsies:       - Prostatic adenocarcinoma, [place] score 4+3=7, Prognostic Grade Group III, discontinuously involving 2 of 3 cores, (90%, 70%)       - 60% Mario pattern 4        - No perineural invasion, lymphovascular invasion or extraprostatic extension is identified  D  Prostate, LCZ, core needle biopsies:       - Prostatic adenocarcinoma, Mario score 4+3=7, Prognostic Grade Group III, involving 2 of 3 core biopsies, (80%, <5%)  - 80% Mario pattern 4        - Intraductal carcinoma is identified        - No perineural invasion, lymphovascular invasion or extraprostatic extension is identified  1/20/2021 Surgery    Prostatectomy    Final Diagnosis   A  Urinary Bladder, Anterior bladder neck, Excision:  - Cystitis cystica and benign prostatic glands  B  Urinary Bladder, Posterior bladder neck, Excision:  - Cystitis cystica and benign prostatic glands        C  Urinary Bladder, Posterior bladder neck, Excision:  - Cystitis cystica  D  Urinary Bladder, Posterior bladder neck, Excision:  - Cystitis cystica  E  Urinary Bladder, Anterior bladder neck, Excision:  - Cystitis cystica  F  Urinary Bladder, Anterior bladder neck, Excision:  - Cystitis cystica  G  Lymph Node, Left pelvic lymph node, Excision:  - One reactive lymph node (0/1), negative for carcinoma, supported by CK-AE1/3 immunostain  H  Lymph Node, Right pelvic lymph node, Excision:  - Benign fibroadipose tissue  I  Urinary Bladder, Posterior bladder neck part 3, Excision:  - Cystitis cystica  J  Urinary Bladder, Posterior bladder neck part 3, Excision[de-identified]  - Cystitis cystica  K  Urinary Bladder, Anterior bladder neck, Excision:  - Cystitis cystica  L  Prostate, Prostatectomy:  - Prostatic adenocarcinoma, [place] score 4+3=7, Prognostic Grade Group 3   * Tumor quantitation: 20%   * 60% Mario pattern 4   * Intraductal carcinoma: Present   * Extraprostatic Extension: Present, focally at left posterior   * Resection margins: Negative for carcinoma   * Perineural invasion: Present   * Lymphovascular Invasion: Not Identified, supported by D2-40 and CD31 immunostain   * Pathologic stage (AJCC, 8th ed ): pT3a, pN0, see synoptic report  M  Urinary Bladder, Anterior bladder neck, Excision:  - Benign fibroadipose tissue  Past Medical History:   Diagnosis Date    Anal fissure     Basal cell carcinoma (BCC)     Basal cell carcinoma (BCC) of face     Cancer (HCC)     Colon polyp     Elevated PSA     GERD (gastroesophageal reflux disease)     Prostate cancer [facility])      Past Surgical History:   Procedure Laterality Date    [facility]  [facility]     EGD      HERNIA REPAIR      inguinal    MO BIOPSY OF PROSTATE,NEEDLE/PUNCH N/A 9/11/2020    Procedure: TRANSRECTAL NEEDLE BIOPSY PROSTATE (TRNBP);   Surgeon: Ryan Hatfield MD;  Location: [facility];  Service: Urology    MO 2 [facility] 0  6-1 CM FACE,FACIAL Right 2021    Procedure: FACIAL LESION EXCISION WITH FROZEN SECTION AND MULTI LAYER CLOSURE ;  Surgeon: Leisa Thomas MD;  Location:  MAIN OR;  Service: General    RI LAP,PROSTATECTOMY,RADICAL,W/NERVE SPARE,INCL ROBOTIC N/A 2021    Procedure: PROSTATECTOMY RADICAL LAPAROSCOPIC  W ROBOTICS;  Surgeon: Dakota Jensen MD;  Location:  MAIN OR;  Service: Urology    SPERMATOCELECTOMY  2018    right spermatocelectomy and multiple lipoma removal    US GUIDED PROSTATE BIOPSY  2020       Family History   Problem Relation Age of Onset    Uterine cancer Sister        Social History   Social History     Substance and Sexual Activity   Alcohol Use Yes    Alcohol/week: 10 0 standard drinks    Types: 10 Cans of beer per week    Comment: Weekends     Social History     Substance and Sexual Activity   Drug Use Yes    Frequency: 2 0 times per week    Types: Marijuana    Comment: Weekends     Social History     Tobacco Use   Smoking Status Former Smoker    Packs/day: 1 00    Years: 3 00    Pack years: 3 00    Quit date: 80    Years since quittin 0   Smokeless Tobacco Never Used         Meds/Allergies     Current Outpatient Medications:     Bioflavonoid Products (STEPHANY C PO), Take 1 tablet by mouth daily  , Disp: , Rfl:     pantoprazole (PROTONIX) 40 mg tablet, TAKE 1 TABLET BY MOUTH EVERY DAY, Disp: 90 tablet, Rfl: 1    docusate sodium (COLACE) 100 mg capsule, Take 1 capsule (100 mg total) by mouth 2 (two) times a day (Patient not taking: Reported on 3/4/2021), Disp: 30 capsule, Rfl: 0    oxybutynin (DITROPAN) 5 mg tablet, Take 1 tablet (5 mg total) by mouth 3 (three) times a day as needed (Bladder spasms, catheterization discomfort) (Patient not taking: Reported on 3/4/2021), Disp: 30 tablet, Rfl: 0    tadalafil (CIALIS) 5 MG tablet, Take 5 mg by mouth daily as needed for erectile dysfunction (Patient not taking: Reported on 2021), Disp: , Rfl:   Allergies Allergen Reactions    Other      DUST    Tylenol [Acetaminophen]      RINGING IN EARS         Review of Systems   Constitutional: Negative  Negative for appetite change, fatigue, fever and unexpected weight change  HENT: Negative  Eyes: Negative  Respiratory: Negative  Negative for cough and shortness of breath  Cardiovascular: Negative  Gastrointestinal: Negative  Negative for abdominal pain, diarrhea and nausea  Endocrine: Negative  Genitourinary: Negative  Negative for dysuria  Nocturia x1  Occasional leaking with urgency  Musculoskeletal: Negative  Negative for gait problem  Skin: Negative  Allergic/Immunologic: Positive for environmental allergies (dust)  Neurological: Negative  Negative for dizziness, weakness, light-headedness and numbness  Hematological: Negative  Does not bruise/bleed easily  Psychiatric/Behavioral: Negative           Clinical Trial: no     IPSS Questionnaire (AUA-7): Over the past month     1)  How often have you had a sensation of not emptying your bladder completely after you finish urinating? 0 - Not at all   2)  How often have you had to urinate again less than two hours after you finished urinating? 0 - Not at all   3)  How often have you found you stopped and started again several times when you urinated? 0 - Not at all   4) How difficult have you found it to postpone urination? 0 - Not at all   5) How often have you had a weak urinary stream?  0 - Not at all   6) How often have you had to push or strain to begin urination? 0 - Not at all   7) How many times did you most typically get up to urinate from the time you went to bed until the time you got up in the morning?   1 - 1 time   Total Score:  1           OBJECTIVE:   /80 (BP Location: Left arm, Patient Position: Sitting)   Pulse 78   Temp 98 °F (36 7 °C) (Temporal)   Resp 16   Ht 5' 11" (1 803 m)   Wt 91 4 kg (201 lb 6 4 oz)   SpO2 96%   BMI 28 09 kg/m²   Pain Assessment:  0  Performance Status: ECOG/Zubrod/WHO: 0 - Asymptomatic    Physical Exam  Vitals reviewed  Constitutional:       Appearance: He is well-developed  HENT:      Head: Normocephalic  Eyes:      Pupils: Pupils are equal, round, and reactive to light  Cardiovascular:      Rate and Rhythm: Normal rate and regular rhythm  Heart sounds: Normal heart sounds  Pulmonary:      Effort: Pulmonary effort is normal       Breath sounds: Normal breath sounds  No wheezing  Chest:      Chest wall: No tenderness  Abdominal:      General: Bowel sounds are normal  There is no distension  Palpations: Abdomen is soft  There is no mass  Tenderness: There is no abdominal tenderness  Musculoskeletal:         General: Normal range of motion  Cervical back: Normal range of motion and neck supple  Lymphadenopathy:      Cervical: No cervical adenopathy  Skin:     General: Skin is warm  Findings: No erythema or rash  Neurological:      Mental Status: He is alert  Cranial Nerves: No cranial nerve deficit  Coordination: Coordination normal    Psychiatric:         Behavior: Behavior normal            RESULTS  Lab Results    Chemistry        Component Value Date/Time    K 4 4 01/21/2021 0521     01/21/2021 0521    CO2 25 01/21/2021 0521    BUN 13 01/21/2021 0521    CREATININE 1 01 01/21/2021 0521        Component Value Date/Time    CALCIUM 8 3 01/21/2021 0521            Lab Results   Component Value Date    WBC 18 16 (H) 01/21/2021    HGB 12 2 01/21/2021    HCT 38 1 01/21/2021    MCV 92 01/21/2021     01/21/2021         Imaging Studies  No results found        Pathology:  SPECIMEN   Procedure   Radical prostatectomy    Prostate Size       Prostate Weight (g)   50 g   Prostate Greatest Dimension (Centimeters)   5 7 cm   Additional Dimension (Centimeters)   3 8 cm       3 5 cm   TUMOR   Histologic Type   Acinar adenocarcinoma    Histologic Grade       Grade Group and Mario Score   Grade group 3 ([place] Score 4 + 3 = 7)    Percentage of Pattern 4   60 %   Intraductal Carcinoma (IDC)   Present    Tumor Quantitation   Estimated percentage of prostate involved by tumor: 20 %   Extraprostatic Extension (EPE)   Present, focal    Location of Extraprostatic Extension   Left posterior    Urinary Bladder Neck Invasion   Not identified    Seminal Vesicle Invasion   Not identified    Treatment Effect   No known presurgical therapy    Lymphovascular Invasion   Not Identified    Perineural Invasion   Present    MARGINS     Margins   Uninvolved by invasive carcinoma    LYMPH NODES   Number of Lymph Nodes Involved   0    Number of Lymph Nodes Examined   1    PATHOLOGIC STAGE CLASSIFICATION (pTNM, AJCC 8th Edition)   Primary Tumor (pT)   pT3a    Regional Lymph Nodes (pN)   pN0      ASSESSMENT  1  Prostate cancer [facility])  Ambulatory referral to Radiation Oncology     Cancer Staging  No matching staging information was found for the patient  PLAN/DISCUSSION  No orders of the defined types were placed in this encounter  Yordy Luna is a 77y o  year old male with Mario 7 (4+3) prostate carcinoma status post prostatectomy 1 year ago  His stage at that time was T3a N0  His margins were negative  His postoperative PSA level was detectable at 0 2  He has had rising PSAs most recently 0 4  We discussed salvage radiation therapy to the prostate bed  We discussed the indication with rising PSA  Ideally this should be considered when PSA is less than 0 5  He understands the target would be to the prostate bed where he may harbor microscopic disease  I reviewed with him the procedure with CT simulation as well as treatment course  We discussed RapidArc technique as well as IGRT  Possible side effects both acute and long-term reviewed with him  This could include but not limited to fatigue, urinary frequency, urgency, enteritis, proctitis, cystitis    He has had some sexual dysfunction however states that he is not sexually active with his partner currently  He has no incontinence  At this point he prefers to observe as his PSA from August in December remains stable at 0 4  He will follow-up with Urology  I have recommended that he return should he continue to have progressive rise in his PSA  Samuel David MD  1/6/2022,11:31 AM      Portions of the record may have been created with voice recognition software  Occasional wrong word or "sound a like" substitutions may have occurred due to the inherent limitations of voice recognition software  Read the chart carefully and recognize, using context, where substitutions have occurred

## 2022-01-06 NOTE — LETTER
2022     Ken Dempsey [address]  [address] [facility] [address]    Patient: Marta Vasquez   YOB: 1955   Date of Visit: 2022       Dear Dr Alanna Flowers:    Thank you for referring Marta Vasquez to me for evaluation  Below are my notes for this consultation  If you have questions, please do not hesitate to call me  I look forward to following your patient along with you  Sincerely,        Austin Lambert MD        CC: No Recipients  Austin Lambert MD  2022 11:39 AM  Sign when Signing Visit  Consultation - Radiation Oncology      [facility]:[id number] : 1955  Encounter: [id number]  Patient Information: [address] [place] [address]  Chief Complaint   Patient presents with    Consult     Cancer Staging  No matching staging information was found for the patient  History of Present Illness     Patient is a 77 y o male with Mario 4+3=7 prostate cancer  He presents today for radiation oncology consult  Referred by Dr Alanna Flowers      He presented with elevated PSA, noted to be elevated at just below 10 in May 2020  He had a prostate biopsy performed 2020 which revealed 4/12 cores positive with [place] 4+3=7  He opted to receive surgery  For preoperative planning he had a CT scan which showed a suspicious lesion in the bone of the pelvis   A follow-up bone scan was negative for metastatic disease       He had a prostatectomy 21  Pathology revealed Mario 4+3=7 prostate cancer  Small extra prostatic extension noted, no evidence of seminal vesicle invasion or bladder neck involvement, margins and lymph nodes were negative  He has been following with Dr Alanna Flowers   Postoperatively his PSA increased from 0 2, to 0 3, to 0 4       PSA's  20   9 84     Component      Latest Ref Rng & Units 10/13/2020 3/8/2021 2021 2021   PSA, Total      0 0 - 4 0 ng/mL 7 9 (H) 0 2 0 3 0 4      Component      Latest Ref Rng & Units 2021   PSA, Total 0 0 - 4 0 ng/mL 0 4         9/11/20 prostate biopsy     1/20/21 PROSTATECTOMY RADICAL LAPAROSCOPIC  W ROBOTICS   Operative Findings:  Challenging prostatectomy secondary to the recess nature of the prostate beneath the pubic bone   Multiple frozen sections obtained at bladder neck until no prostatic tissue identified   Bilateral non nerve spare approach secondary to tight pelvis   bilateral pelvic lymph node dissection      Pathology:   A  Urinary Bladder, Anterior bladder neck, Excision:  - Cystitis cystica and benign prostatic glands      B  Urinary Bladder, Posterior bladder neck, Excision:  - Cystitis cystica and benign prostatic glands       C  Urinary Bladder, Posterior bladder neck, Excision:  - Cystitis cystica      D  Urinary Bladder, Posterior bladder neck, Excision:  - Cystitis cystica      E  Urinary Bladder, Anterior bladder neck, Excision:  - Cystitis cystica      F  Urinary Bladder, Anterior bladder neck, Excision:  - Cystitis cystica      G  Lymph Node, Left pelvic lymph node, Excision:  - One reactive lymph node (0/1), negative for carcinoma, supported by CK-AE1/3 immunostain      H  Lymph Node, Right pelvic lymph node, Excision:  - Benign fibroadipose tissue      I  Urinary Bladder, Posterior bladder neck part 3, Excision:  - Cystitis cystica      J  Urinary Bladder, Posterior bladder neck part 3, Excision[de-identified]  - Cystitis cystica      K  Urinary Bladder, Anterior bladder neck, Excision:  - Cystitis cystica      L  Prostate, Prostatectomy:  - Prostatic adenocarcinoma, [place] score 4+3=7, Prognostic Grade Group 3   * Tumor quantitation: 20%   * 60% [place] pattern 4   * Intraductal carcinoma: Present   * Extraprostatic Extension: Present, focally at left posterior   * Resection margins: Negative for carcinoma   * Perineural invasion: Present   * Lymphovascular Invasion: Not Identified, supported by D2-40 and CD31 immunostain   * Pathologic stage (AJCC, 8th ed ): pT3a, pN0, see synoptic report      M  Urinary Bladder, Anterior bladder neck, Excision:  - Benign fibroadipose tissue      10/6/20 CT abdomen pelvis-   -14 mm mixed sclerotic and lytic lesion at the right iliac bone   No prior studies are available for comparison   Findings there is a suspicion for metastatic disease   A whole body bone scan is recommended for further evaluation    -No acute intra-abdominal abnormality   No free air or free fluid   -No intra-abdominal or pelvic lymphadenopathy      10/17/20 bone scan-    1   Healing fracture of the left 7th costochondral junction   No scintigraphic evidence of osseous metastasis   Continued CT follow-up recommended for right iliac bone lesion         12/9/21 Dr Tadeo Keller- Discussed PSA 0 4  which is stable  He has prostate tissue left behind, question whether this is benign or cancerous  Refer to rad onc to discuss risk and benefits of RT  Hold ADT at this time  F/u 3 months with PSA  He is dry and wears no pads   He struggles with erectile dysfunction  Harriettanali Boyd has not been using his prescribed intracavernosal Tri Mix         Upcoming:   3/11/22 Dr Isaias Abdullahi   Oncology History   Prostate cancer [facility])   9/11/2020 Initial Diagnosis    Prostate cancer ([facility] [address] )     9/11/2020 Biopsy    Final Diagnosis   A  Prostate, RPZ, core needle biopsies:       - Focal high-grade pin        - No prostatic adenocarcinoma is identified  B  Prostate, RCZ, core needle biopsies:       - Focal high-grade pin        - No prostatic adenocarcinoma is identified  C  Prostate, BROOKS, core needle biopsies:       - Prostatic adenocarcinoma, [place] score 4+3=7, Prognostic Grade Group III, discontinuously involving 2 of 3 cores, (90%, 70%)       - 60% [place] pattern 4        - No perineural invasion, lymphovascular invasion or extraprostatic extension is identified            D  Prostate, LCZ, core needle biopsies:       - Prostatic adenocarcinoma, Mario score 4+3=7, Prognostic Grade Group III, involving 2 of 3 core biopsies, (80%, <5%)  - 80% Mario pattern 4        - Intraductal carcinoma is identified        - No perineural invasion, lymphovascular invasion or extraprostatic extension is identified  1/20/2021 Surgery    Prostatectomy    Final Diagnosis   A  Urinary Bladder, Anterior bladder neck, Excision:  - Cystitis cystica and benign prostatic glands  B  Urinary Bladder, Posterior bladder neck, Excision:  - Cystitis cystica and benign prostatic glands  C  Urinary Bladder, Posterior bladder neck, Excision:  - Cystitis cystica  D  Urinary Bladder, Posterior bladder neck, Excision:  - Cystitis cystica  E  Urinary Bladder, Anterior bladder neck, Excision:  - Cystitis cystica  F  Urinary Bladder, Anterior bladder neck, Excision:  - Cystitis cystica  G  Lymph Node, Left pelvic lymph node, Excision:  - One reactive lymph node (0/1), negative for carcinoma, supported by CK-AE1/3 immunostain  H  Lymph Node, Right pelvic lymph node, Excision:  - Benign fibroadipose tissue  I  Urinary Bladder, Posterior bladder neck part 3, Excision:  - Cystitis cystica  J  Urinary Bladder, Posterior bladder neck part 3, Excision[de-identified]  - Cystitis cystica  K  Urinary Bladder, Anterior bladder neck, Excision:  - Cystitis cystica  L  Prostate, Prostatectomy:  - Prostatic adenocarcinoma, Mario score 4+3=7, Prognostic Grade Group 3   * Tumor quantitation: 20%   * 60% Mario pattern 4   * Intraductal carcinoma: Present   * Extraprostatic Extension: Present, focally at left posterior   * Resection margins: Negative for carcinoma   * Perineural invasion: Present   * Lymphovascular Invasion: Not Identified, supported by D2-40 and CD31 immunostain   * Pathologic stage (AJCC, 8th ed ): pT3a, pN0, see synoptic report  M  Urinary Bladder, Anterior bladder neck, Excision:  - Benign fibroadipose tissue                Past Medical History:   Diagnosis Date    Anal fissure     Basal cell carcinoma (BCC)     Basal cell carcinoma (BCC) of face     Cancer (HCC)     Colon polyp     Elevated PSA     GERD (gastroesophageal reflux disease)     Prostate cancer [facility])      Past Surgical History:   Procedure Laterality Date    [facility]COPY  2020    [facility]     EGD      HERNIA REPAIR      inguinal    MD BIOPSY OF PROSTATE,NEEDLE/PUNCH N/A 2020    Procedure: TRANSRECTAL NEEDLE BIOPSY PROSTATE (TRNBP);   Surgeon: Nidia Medley MD;  Location: [facility];  Service: Urology    MD EXC SKIN BENIG 0 6-1 CM FACE,FACIAL Right 2021    Procedure: FACIAL LESION EXCISION WITH FROZEN SECTION AND MULTI LAYER CLOSURE ;  Surgeon: Raymond Xiao MD;  Location: [facility];  Service: General    MD LAP,PROSTATECTOMY,RADICAL,W/NERVE SPARE,INCL ROBOTIC N/A 2021    Procedure: Patrick De Guzman  W ROBOTICS;  Surgeon: Nidia Medley MD;  Location: [facility];  Service: Urology    SPERMATOCELECTOMY  2018    right spermatocelectomy and multiple lipoma removal    US GUIDED PROSTATE BIOPSY  2020       Family History   Problem Relation Age of Onset    Uterine cancer Sister        Social History   Social History     Substance and Sexual Activity   Alcohol Use Yes    Alcohol/week: 10 0 standard drinks    Types: 10 Cans of beer per week    Comment: Weekends     Social History     Substance and Sexual Activity   Drug Use Yes    Frequency: 2 0 times per week    Types: Marijuana    Comment: Weekends     Social History     Tobacco Use   Smoking Status Former Smoker    Packs/day: 1 00    Years: 3 00    Pack years: 3 00    Quit date: 80    Years since quittin 0   Smokeless Tobacco Never Used         Meds/Allergies     Current Outpatient Medications:     Bioflavonoid Products (STEPHANY C PO), Take 1 tablet by mouth daily  , Disp: , Rfl:     pantoprazole (PROTONIX) 40 mg tablet, TAKE 1 TABLET BY MOUTH EVERY DAY, Disp: 90 tablet, Rfl: 1    docusate sodium (COLACE) 100 mg capsule, Take 1 capsule (100 mg total) by mouth 2 (two) times a day (Patient not taking: Reported on 3/4/2021), Disp: 30 capsule, Rfl: 0    oxybutynin (DITROPAN) 5 mg tablet, Take 1 tablet (5 mg total) by mouth 3 (three) times a day as needed (Bladder spasms, catheterization discomfort) (Patient not taking: Reported on 3/4/2021), Disp: 30 tablet, Rfl: 0    tadalafil (CIALIS) 5 MG tablet, Take 5 mg by mouth daily as needed for erectile dysfunction (Patient not taking: Reported on 6/28/2021), Disp: , Rfl:   Allergies   Allergen Reactions    Other      DUST    Tylenol [Acetaminophen]      RINGING IN EARS         Review of Systems   Constitutional: Negative  Negative for appetite change, fatigue, fever and unexpected weight change  HENT: Negative  Eyes: Negative  Respiratory: Negative  Negative for cough and shortness of breath  Cardiovascular: Negative  Gastrointestinal: Negative  Negative for abdominal pain, diarrhea and nausea  Endocrine: Negative  Genitourinary: Negative  Negative for dysuria  Nocturia x1  Occasional leaking with urgency  Musculoskeletal: Negative  Negative for gait problem  Skin: Negative  Allergic/Immunologic: Positive for environmental allergies (dust)  Neurological: Negative  Negative for dizziness, weakness, light-headedness and numbness  Hematological: Negative  Does not bruise/bleed easily  Psychiatric/Behavioral: Negative           Clinical Trial: no     IPSS Questionnaire (AUA-7): Over the past month     1)  How often have you had a sensation of not emptying your bladder completely after you finish urinating? 0 - Not at all   2)  How often have you had to urinate again less than two hours after you finished urinating? 0 - Not at all   3)  How often have you found you stopped and started again several times when you urinated? 0 - Not at all   4) How difficult have you found it to postpone urination?   0 - Not at all   5) How often have you had a weak urinary stream?  0 - Not at all   6) How often have you had to push or strain to begin urination? 0 - Not at all   7) How many times did you most typically get up to urinate from the time you went to bed until the time you got up in the morning? 1 - 1 time   Total Score:  1           OBJECTIVE:   /80 (BP Location: Left arm, Patient Position: Sitting)   Pulse 78   Temp 98 °F (36 7 °C) (Temporal)   Resp 16   Ht 5' 11" (1 803 m)   Wt 91 4 kg (201 lb 6 4 oz)   SpO2 96%   BMI 28 09 kg/m²   Pain Assessment:  0  Performance Status: ECOG/Zubrod/WHO: 0 - Asymptomatic    Physical Exam  Vitals reviewed  Constitutional:       Appearance: He is well-developed  HENT:      Head: Normocephalic  Eyes:      Pupils: Pupils are equal, round, and reactive to light  Cardiovascular:      Rate and Rhythm: Normal rate and regular rhythm  Heart sounds: Normal heart sounds  Pulmonary:      Effort: Pulmonary effort is normal       Breath sounds: Normal breath sounds  No wheezing  Chest:      Chest wall: No tenderness  Abdominal:      General: Bowel sounds are normal  There is no distension  Palpations: Abdomen is soft  There is no mass  Tenderness: There is no abdominal tenderness  Musculoskeletal:         General: Normal range of motion  Cervical back: Normal range of motion and neck supple  Lymphadenopathy:      Cervical: No cervical adenopathy  Skin:     General: Skin is warm  Findings: No erythema or rash  Neurological:      Mental Status: He is alert  Cranial Nerves: No cranial nerve deficit        Coordination: Coordination normal    Psychiatric:         Behavior: Behavior normal            RESULTS  Lab Results    Chemistry        Component Value Date/Time    K 4 4 01/21/2021 0521     01/21/2021 0521    CO2 25 01/21/2021 0521    BUN 13 01/21/2021 0521    CREATININE 1 01 01/21/2021 0521        Component Value Date/Time    CALCIUM 8 3 01/21/2021 0521 Lab Results   Component Value Date    WBC 18 16 (H) 01/21/2021    HGB 12 2 01/21/2021    HCT 38 1 01/21/2021    MCV 92 01/21/2021     01/21/2021         Imaging Studies  No results found  Pathology:  SPECIMEN   Procedure   Radical prostatectomy    Prostate Size       Prostate Weight (g)   50 g   Prostate Greatest Dimension (Centimeters)   5 7 cm   Additional Dimension (Centimeters)   3 8 cm       3 5 cm   TUMOR   Histologic Type   Acinar adenocarcinoma    Histologic Grade       Grade Group and Forsan Score   Grade group 3 (Forsan Score 4 + 3 = 7)    Percentage of Pattern 4   60 %   Intraductal Carcinoma (IDC)   Present    Tumor Quantitation   Estimated percentage of prostate involved by tumor: 20 %   Extraprostatic Extension (EPE)   Present, focal    Location of Extraprostatic Extension   Left posterior    Urinary Bladder Neck Invasion   Not identified    Seminal Vesicle Invasion   Not identified    Treatment Effect   No known presurgical therapy    Lymphovascular Invasion   Not Identified    Perineural Invasion   Present    MARGINS     Margins   Uninvolved by invasive carcinoma    LYMPH NODES   Number of Lymph Nodes Involved   0    Number of Lymph Nodes Examined   1    PATHOLOGIC STAGE CLASSIFICATION (pTNM, AJCC 8th Edition)   Primary Tumor (pT)   pT3a    Regional Lymph Nodes (pN)   pN0      ASSESSMENT  1  Prostate cancer [facility])  Ambulatory referral to Radiation Oncology     Cancer Staging  No matching staging information was found for the patient  PLAN/DISCUSSION  No orders of the defined types were placed in this encounter  Diane Donaldson is a 77y o  year old male with Forsan 7 (4+3) prostate carcinoma status post prostatectomy 1 year ago  His stage at that time was T3a N0  His margins were negative  His postoperative PSA level was detectable at 0 2  He has had rising PSAs most recently 0 4  We discussed salvage radiation therapy to the prostate bed    We discussed the indication with rising PSA  Ideally this should be considered when PSA is less than 0 5  He understands the target would be to the prostate bed where he may harbor microscopic disease  I reviewed with him the procedure with CT simulation as well as treatment course  We discussed RapidArc technique as well as IGRT  Possible side effects both acute and long-term reviewed with him  This could include but not limited to fatigue, urinary frequency, urgency, enteritis, proctitis, cystitis  He has had some sexual dysfunction however states that he is not sexually active with his partner currently  He has no incontinence  At this point he prefers to observe as his PSA from August in December remains stable at 0 4  He will follow-up with Urology  I have recommended that he return should he continue to have progressive rise in his PSA  Odette Andres MD  1/6/2022,11:31 AM      Portions of the record may have been created with voice recognition software  Occasional wrong word or "sound a like" substitutions may have occurred due to the inherent limitations of voice recognition software  Read the chart carefully and recognize, using context, where substitutions have occurred

## 2022-01-10 DIAGNOSIS — Z11.59 SCREENING FOR VIRAL DISEASE: Primary | ICD-10-CM

## 2022-01-10 PROCEDURE — U0005 INFEC AGEN DETEC AMPLI PROBE: HCPCS | Performed by: INTERNAL MEDICINE

## 2022-01-10 PROCEDURE — U0003 INFECTIOUS AGENT DETECTION BY NUCLEIC ACID (DNA OR RNA); SEVERE ACUTE RESPIRATORY SYNDROME CORONAVIRUS 2 (SARS-COV-2) (CORONAVIRUS DISEASE [COVID-19]), AMPLIFIED PROBE TECHNIQUE, MAKING USE OF HIGH THROUGHPUT TECHNOLOGIES AS DESCRIBED BY CMS-2020-01-R: HCPCS | Performed by: INTERNAL MEDICINE

## 2022-01-12 ENCOUNTER — TELEPHONE (OUTPATIENT)
Dept: OTHER | Facility: OTHER | Age: 67
End: 2022-01-12

## 2022-01-12 NOTE — TELEPHONE ENCOUNTER
Spoke with patient and let him know his COVID test is positive  Patient states he is feeling better and feeling well  Spoke with him about quarantine time and advised he call back with any questions or concerns

## 2022-02-10 ENCOUNTER — HOSPITAL ENCOUNTER (OUTPATIENT)
Dept: RADIOLOGY | Facility: HOSPITAL | Age: 67
Discharge: HOME | End: 2022-02-10
Payer: COMMERCIAL

## 2022-02-10 DIAGNOSIS — R07.9 CHEST PAIN, UNSPECIFIED TYPE: ICD-10-CM

## 2022-02-10 PROCEDURE — 71101 X-RAY EXAM UNILAT RIBS/CHEST: CPT

## 2022-03-23 ENCOUNTER — TELEPHONE (OUTPATIENT)
Dept: UROLOGY | Facility: CLINIC | Age: 67
End: 2022-03-23

## 2022-03-23 ENCOUNTER — APPOINTMENT (OUTPATIENT)
Dept: LAB | Facility: HOSPITAL | Age: 67
End: 2022-03-23
Attending: UROLOGY
Payer: COMMERCIAL

## 2022-03-23 DIAGNOSIS — C61 PROSTATE CANCER (HCC): ICD-10-CM

## 2022-03-23 LAB — PSA SERPL-MCNC: 0.6 NG/ML (ref 0–4)

## 2022-03-23 PROCEDURE — 84153 ASSAY OF PSA TOTAL: CPT

## 2022-03-23 NOTE — TELEPHONE ENCOUNTER
ELAINEI:  Patient managed by Dr Enriquez [facility] [place]) patient called in stating he is unable to keep 3/25/22 3:45pm appt  Patient stated he will get his psa and asked if it can be reviewed  Patient rescheduled for 6/14/22 with FT, pending the results of psa    Patient can be reached at [phone]

## 2022-06-14 ENCOUNTER — TELEPHONE (OUTPATIENT)
Dept: UROLOGY | Facility: AMBULATORY SURGERY CENTER | Age: 67
End: 2022-06-14

## 2022-06-14 NOTE — TELEPHONE ENCOUNTER
----- Message from Robi Xavier MD sent at 6/14/2022  7:46 AM EDT -----  Tish Denver did not show for his appt today at 0730  He is s/p RALP and his PSA is rising  I have referred him to [facility] and he needs to go back and see them to discuss XRT  Please call patient and try to reschedule with me to discuss  Let him know his PSA is now 0 6 and it should be undetectable  Thank you      FT

## 2022-06-14 NOTE — TELEPHONE ENCOUNTER
LM that he missed his appointment this morning with Dr Michoacano Peters  - He referred him to Radiation oncology    He would like you to r/s appointment with him (Dr Michoacano Peters)

## 2022-06-14 NOTE — TELEPHONE ENCOUNTER
----- Message from Eliel Banegas MD sent at 6/14/2022  7:46 AM EDT -----  Stephie De Souza did not show for his appt today at 0730  He is s/p RALP and his PSA is rising  I have referred him to [facility] and he needs to go back and see them to discuss XRT  Please call patient and try to reschedule with me to discuss  Let him know his PSA is now 0 6 and it should be undetectable  Thank you      FT

## 2022-06-15 NOTE — TELEPHONE ENCOUNTER
Patient of Dr Jackie Pickard called back to see if he should have his PSA checked  He feels there's no need for him to come back for an appointment if nothing has changed      Patient can be reached at [phone]

## 2022-06-15 NOTE — TELEPHONE ENCOUNTER
Spoke with patient  He was upset that he has to come in as he does not want to pursue radiation as he did his research and it "burns your insides"  He stated that his PSA is undetectable at 0 4  I advised that it has been slowly rising and is now 0 6 and undetectable is <0 1  He said he will go for a PSA tomorrow morning and come in for 11:45

## 2022-06-15 NOTE — TELEPHONE ENCOUNTER
His last PSA from 3/23/2022 ws 0 6  Per Dr Cindy Graves, patient should be rescheduled for office visit to discuss rising PSA  OK to order repeat PSA now

## 2022-06-16 ENCOUNTER — OFFICE VISIT (OUTPATIENT)
Dept: UROLOGY | Facility: AMBULATORY SURGERY CENTER | Age: 67
End: 2022-06-16
Payer: COMMERCIAL

## 2022-06-16 ENCOUNTER — TELEPHONE (OUTPATIENT)
Dept: UROLOGY | Facility: AMBULATORY SURGERY CENTER | Age: 67
End: 2022-06-16

## 2022-06-16 ENCOUNTER — APPOINTMENT (OUTPATIENT)
Dept: LAB | Facility: HOSPITAL | Age: 67
End: 2022-06-16
Payer: COMMERCIAL

## 2022-06-16 VITALS
BODY MASS INDEX: 28.42 KG/M2 | HEART RATE: 78 BPM | SYSTOLIC BLOOD PRESSURE: 140 MMHG | HEIGHT: 71 IN | DIASTOLIC BLOOD PRESSURE: 82 MMHG | WEIGHT: 203 LBS

## 2022-06-16 DIAGNOSIS — C61 PROSTATE CANCER (HCC): Primary | ICD-10-CM

## 2022-06-16 DIAGNOSIS — C61 PROSTATE CANCER (HCC): ICD-10-CM

## 2022-06-16 LAB — PSA SERPL-MCNC: 0.6 NG/ML (ref 0–4)

## 2022-06-16 PROCEDURE — 84153 ASSAY OF PSA TOTAL: CPT

## 2022-06-16 PROCEDURE — 99215 OFFICE O/P EST HI 40 MIN: CPT | Performed by: UROLOGY

## 2022-06-16 NOTE — PROGRESS NOTES
6/16/2022    Rae Trejo  1955  [id number]        Assessment  History of Mario 4+3=7/10 prostate cancer, status post robot assisted laparoscopic prostatectomy, detectable postoperative PSA    Discussion  I had a lengthy discussion with Josie Yusuf the office today  Based on his pathology with intraductal carcinoma and a rising PSA post prostatectomy, I strongly recommend a referral back to Radiation Oncology to discuss salvage radiation therapy  In the interim I have ordered a PSMA CT-PET scan to see if there is activity in the region of the prostatic bed  I would hold on androgen deprivation therapy until the results of the PSMA scan and consultation with Radiology have been obtained  Ideally he can receive salvage radiation therapy potentially without hormones so that androgen deprivation therapy could be saved if needed in the future  History of Present Illness  79 y o  male with a history of Mario 7 prostate cancer diagnosed on a biopsy performed in September of 2020  Mario 4 + 3 disease was identified  His PSA at that time was noted to be just below 10  Four of 12 cores were positive  His pre prostatectomy PSA was 7 9  He underwent a robot assisted laparoscopic prostatectomy performed in January of 2021  Her pathology revealed  [place] 4 + 3 disease with a focally positive area of extracapsular extension  All lymph nodes were negative  All surgical margins were negative  Intraductal carcinoma was present  He also had what appeared to be cystitis cystica at the bladder neck and multiple frozen section showed initial benign prostate glands at the bladder neck on biopsy  His 1st postoperative PSA was 0 2  His PSA has been slowly rising since that time  In March of 2022 was 0 6  He did a repeat PSA today which is still pending at the time of this dictation  In the past he was evaluated by Radiation Oncology to discuss adjuvant radiation therapy    He returns to the office today for additional discussion  AUA Symptom Score      Review of Systems  Review of Systems   Constitutional: Negative  HENT: Negative  Eyes: Negative  Respiratory: Negative  Cardiovascular: Negative  Gastrointestinal: Negative  Endocrine: Negative  Genitourinary:        Per HPI   Musculoskeletal: Negative  Skin: Negative  Allergic/Immunologic: Negative  Neurological: Negative  Hematological: Negative  Psychiatric/Behavioral: Negative  Past Medical History  Past Medical History:   Diagnosis Date    Anal fissure     Basal cell carcinoma (BCC)     Basal cell carcinoma (BCC) of face     Cancer (HCC)     Colon polyp     Elevated PSA     GERD (gastroesophageal reflux disease)     Prostate cancer [facility])        Past Social History  Past Surgical History:   Procedure Laterality Date    [facility]  05/[facility]    [facility]     EGD      HERNIA REPAIR      inguinal    DC BIOPSY OF PROSTATE,NEEDLE/PUNCH N/A 9/11/2020    Procedure: TRANSRECTAL NEEDLE BIOPSY PROSTATE (TRNBP);   Surgeon: Diane Strickland MD;  [facility];  Service: Urology    DC EXC SKIN BENIG 0 6-1 CM FACE,FACIAL Right 4/6/2021    Procedure: FACIAL LESION EXCISION WITH FROZEN SECTION AND MULTI LAYER CLOSURE ;  Surgeon: Eliane Mina MD;  Location:  MAIN OR;  Service: General    DC LAP,PROSTATECTOMY,RADICAL,W/NERVE SPARE,INCL ROBOTIC N/A 1/20/2021    Procedure: Branden Cardona  W ROBOTICS;  Surgeon: Diane Strickland MD;  [facility];  Service: Urology    SPERMATOCELECTOMY  02/2018    right spermatocelectomy and multiple lipoma removal    US GUIDED PROSTATE BIOPSY  9/11/2020       Past Family History  Family History   Problem Relation Age of Onset    Uterine cancer Sister        Past Social history  Social History     Socioeconomic History    Marital status: Single     Spouse name: Not on file    Number of children: Not on file    Years of education: Not on file   Adrianna Connelly education level: Not on file   Occupational History    Occupation: self employed   Tobacco Use    Smoking status: Former Smoker     Packs/day: 1 00     Years: 3 00     Pack years: 3 00     Quit date:      Years since quittin 4    Smokeless tobacco: Never Used   Vaping Use    Vaping Use: Never used   Substance and Sexual Activity    Alcohol use: Yes     Alcohol/week: 10 0 standard drinks     Types: 10 Cans of beer per week     Comment: Weekends    Drug use: Yes     Frequency: 2 0 times per week     Types: Marijuana     Comment: Weekends    Sexual activity: Yes     Partners: Female   Other Topics Concern    Not on file   Social History Narrative    Most recent tobacco use screenin2018    Live alone or with others: with others    Marital status: Single    Occupation: self employed    Are you currently employed:  Yes    Alcohol intake: Occasional     Social Determinants of Health     Financial Resource Strain: Not on file   Food Insecurity: Not on file   Transportation Needs: Not on file   Physical Activity: Not on file   Stress: Not on file   Social Connections: Not on file   Intimate Partner Violence: Not on file   Housing Stability: Not on file       Current Medications  Current Outpatient Medications   Medication Sig Dispense Refill    Bioflavonoid Products (STEPHANY C PO) Take 1 tablet by mouth daily   (Patient not taking: Reported on 2022)      docusate sodium (COLACE) 100 mg capsule Take 1 capsule (100 mg total) by mouth 2 (two) times a day (Patient not taking: No sig reported) 30 capsule 0    oxybutynin (DITROPAN) 5 mg tablet Take 1 tablet (5 mg total) by mouth 3 (three) times a day as needed (Bladder spasms, catheterization discomfort) (Patient not taking: No sig reported) 30 tablet 0    pantoprazole (PROTONIX) 40 mg tablet TAKE 1 TABLET BY MOUTH EVERY DAY (Patient not taking: Reported on 2022) 90 tablet 1    tadalafil (CIALIS) 5 MG tablet Take 5 mg by mouth daily as needed for erectile dysfunction (Patient not taking: No sig reported)       No current facility-administered medications for this visit  Allergies  Allergies   Allergen Reactions    Other      DUST    Tylenol [Acetaminophen]      RINGING IN EARS       Past Medical History, Social History, Family History, medications and allergies were reviewed  Vitals  Vitals:    06/16/22 1147   BP: 140/82   Pulse: 78   Weight: 92 1 kg (203 lb)   Height: 5' 11" (1 803 m)       Physical Exam  Physical Exam  On examination he is in no acute distress    Gait normal   Affect nor    Results  Lab Results   Component Value Date    PSA 0 6 03/23/2022    PSA 0 4 12/08/2021    PSA 0 4 08/19/2021     Lab Results   Component Value Date    CALCIUM 8 3 01/21/2021    K 4 4 01/21/2021    CO2 25 01/21/2021     01/21/2021    BUN 13 01/21/2021    CREATININE 1 01 01/21/2021     Lab Results   Component Value Date    WBC 18 16 (H) 01/21/2021    HGB 12 2 01/21/2021    HCT 38 1 01/21/2021    MCV 92 01/21/2021     01/21/2021         Office Urine Dip  No results found for this or any previous visit (from the past 1 hour(s)) ]

## 2022-06-16 NOTE — LETTER
June 16, 2022     Carmela Gaston MD  [address] [facility] [address] [facility]    Patient: Roxie Abbott   YOB: 1955   Date of Visit: 6/16/2022       Dear Dr Huey Antony: Thank you for referring Roxie Abbott to me for evaluation  Below are my notes for this consultation  If you have questions, please do not hesitate to call me  I look forward to following your patient along with you  Sincerely,        Breanna Potter MD        CC: MD Breanna Chen MD  6/16/2022  1:03 PM  Sign when Signing Visit  6/16/2022    Roxie Abbott  1955  [id number]        Assessment  History of [place] 4+3=7/10 prostate cancer, status post robot assisted laparoscopic prostatectomy, detectable postoperative PSA    Discussion  I had a lengthy discussion with Davis Thakkar the office today  Based on his pathology with intraductal carcinoma and a rising PSA post prostatectomy, I strongly recommend a referral back to Radiation Oncology to discuss salvage radiation therapy  In the interim I have ordered a PSMA CT-PET scan to see if there is activity in the region of the prostatic bed  I would hold on androgen deprivation therapy until the results of the PSMA scan and consultation with Radiology have been obtained  Ideally he can receive salvage radiation therapy potentially without hormones so that androgen deprivation therapy could be saved if needed in the future  History of Present Illness  79 y o  male with a history of Mario 7 prostate cancer diagnosed on a biopsy performed in September of 2020  Mario 4 + 3 disease was identified  His PSA at that time was noted to be just below 10  Four of 12 cores were positive  His pre prostatectomy PSA was 7 9  He underwent a robot assisted laparoscopic prostatectomy performed in January of 2021  Her pathology revealed  [place] 4 + 3 disease with a focally positive area of extracapsular extension    All lymph nodes were negative  All surgical margins were negative  Intraductal carcinoma was present  He also had what appeared to be cystitis cystica at the bladder neck and multiple frozen section showed initial benign prostate glands at the bladder neck on biopsy  His 1st postoperative PSA was 0 2  His PSA has been slowly rising since that time  In March of 2022 was 0 6  He did a repeat PSA today which is still pending at the time of this dictation  In the past he was evaluated by Radiation Oncology to discuss adjuvant radiation therapy  He returns to the office today for additional discussion  AUA Symptom Score      Review of Systems  Review of Systems   Constitutional: Negative  HENT: Negative  Eyes: Negative  Respiratory: Negative  Cardiovascular: Negative  Gastrointestinal: Negative  Endocrine: Negative  Genitourinary:        Per HPI   Musculoskeletal: Negative  Skin: Negative  Allergic/Immunologic: Negative  Neurological: Negative  Hematological: Negative  Psychiatric/Behavioral: Negative  Past Medical History  Past Medical History:   Diagnosis Date    Anal fissure     Basal cell carcinoma (BCC)     Basal cell carcinoma (BCC) of face     Cancer (HCC)     Colon polyp     Elevated PSA     GERD (gastroesophageal reflux disease)     Prostate cancer [facility])        Past Social History  Past Surgical History:   Procedure Laterality Date    [facility]/2020    [facility]     EGD      HERNIA REPAIR      inguinal    ID BIOPSY OF PROSTATE,NEEDLE/PUNCH N/A 9/11/2020    Procedure: TRANSRECTAL NEEDLE BIOPSY PROSTATE (TRNBP);   Surgeon: Javier Bowman MD;  Location:  MAIN OR;  Service: Urology    ID EXC SKIN BENIG 0 6-1 CM FACE,FACIAL Right 4/6/2021    Procedure: FACIAL LESION EXCISION WITH FROZEN SECTION AND MULTI LAYER CLOSURE ;  Surgeon: Joey Ferguson MD;  Location:  MAIN OR;  Service: General    ID LAP,PROSTATECTOMY,RADICAL,W/NERVE SPARE,INCL ROBOTIC N/A 2021    Procedure: PROSTATECTOMY RADICAL LAPAROSCOPIC  W ROBOTICS;  Surgeon: Violette Murdock MD;  [facility];  Service: Urology    SPERMATOCELECTOMY  2018    right spermatocelectomy and multiple lipoma removal    US GUIDED PROSTATE BIOPSY  2020       Past Family History  Family History   Problem Relation Age of Onset    Uterine cancer Sister        Past Social history  Social History     Socioeconomic History    Marital status: Single     Spouse name: Not on file    Number of children: Not on file    Years of education: Not on file    Highest education level: Not on file   Occupational History    Occupation: self employed   Tobacco Use    Smoking status: Former Smoker     Packs/day: 1 00     Years: 3 00     Pack years: 3 00     Quit date:      Years since quittin 4    Smokeless tobacco: Never Used   Vaping Use    Vaping Use: Never used   Substance and Sexual Activity    Alcohol use: Yes     Alcohol/week: 10 0 standard drinks     Types: 10 Cans of beer per week     Comment: Weekends    Drug use: Yes     Frequency: 2 0 times per week     Types: Marijuana     Comment: Weekends    Sexual activity: Yes     Partners: Female   Other Topics Concern    Not on file   Social History Narrative    Most recent tobacco use screenin2018    Live alone or with others: with others    Marital status: Single    Occupation: self employed    Are you currently employed:  Yes    Alcohol intake: Occasional     Social Determinants of Health     Financial Resource Strain: Not on file   Food Insecurity: Not on file   Transportation Needs: Not on file   Physical Activity: Not on file   Stress: Not on file   Social Connections: Not on file   Intimate Partner Violence: Not on file   Housing Stability: Not on file       Current Medications  Current Outpatient Medications   Medication Sig Dispense Refill    Bioflavonoid Products (STEPHANY C PO) Take 1 tablet by mouth daily   (Patient not taking: Reported on 6/16/2022)      docusate sodium (COLACE) 100 mg capsule Take 1 capsule (100 mg total) by mouth 2 (two) times a day (Patient not taking: No sig reported) 30 capsule 0    oxybutynin (DITROPAN) 5 mg tablet Take 1 tablet (5 mg total) by mouth 3 (three) times a day as needed (Bladder spasms, catheterization discomfort) (Patient not taking: No sig reported) 30 tablet 0    pantoprazole (PROTONIX) 40 mg tablet TAKE 1 TABLET BY MOUTH EVERY DAY (Patient not taking: Reported on 6/16/2022) 90 tablet 1    tadalafil (CIALIS) 5 MG tablet Take 5 mg by mouth daily as needed for erectile dysfunction (Patient not taking: No sig reported)       No current facility-administered medications for this visit  Allergies  Allergies   Allergen Reactions    Other      DUST    Tylenol [Acetaminophen]      RINGING IN EARS       Past Medical History, Social History, Family History, medications and allergies were reviewed  Vitals  Vitals:    06/16/22 1147   BP: 140/82   Pulse: 78   Weight: 92 1 kg (203 lb)   Height: 5' 11" (1 803 m)       Physical Exam  Physical Exam  On examination he is in no acute distress    Gait normal   Affect nor    Results  Lab Results   Component Value Date    PSA 0 6 03/23/2022    PSA 0 4 12/08/2021    PSA 0 4 08/19/2021     Lab Results   Component Value Date    CALCIUM 8 3 01/21/2021    K 4 4 01/21/2021    CO2 25 01/21/2021     01/21/2021    BUN 13 01/21/2021    CREATININE 1 01 01/21/2021     Lab Results   Component Value Date    WBC 18 16 (H) 01/21/2021    HGB 12 2 01/21/2021    HCT 38 1 01/21/2021    MCV 92 01/21/2021     01/21/2021         Office Urine Dip  No results found for this or any previous visit (from the past 1 hour(s)) ]

## 2022-06-16 NOTE — TELEPHONE ENCOUNTER
Spoke to Karo sosa ([phone]) from [address] [place] [address], they will call the pt to sched testing, sched pt a f/u w/ft only after (4w f/u)

## 2022-06-20 NOTE — TELEPHONE ENCOUNTER
Pt seen by Dr Cassi Aceves on 06/16/22, he needs a 4w f/u w/ft only NM PET CT ptv (Anne Shelton 06/29/22) pt has a consult with Radiation on 07/07/22   Please advise on appt

## 2022-06-24 NOTE — TELEPHONE ENCOUNTER
LMOM for pt tentatively scheduled pt for 8/11/22 at 1030am with FT in [place]  Per Rasheed Martin might be opening a day at the end of July and will call pt to come in sooner  Office number provided

## 2022-06-29 ENCOUNTER — HOSPITAL ENCOUNTER (OUTPATIENT)
Dept: RADIOLOGY | Age: 67
Discharge: HOME | End: 2022-06-29
Payer: COMMERCIAL

## 2022-06-29 DIAGNOSIS — C61 PROSTATE CANCER (HCC): ICD-10-CM

## 2022-06-29 PROCEDURE — 78815 PET IMAGE W/CT SKULL-THIGH: CPT

## 2022-06-29 PROCEDURE — G1004 CDSM NDSC: HCPCS

## 2022-06-29 PROCEDURE — A9595 HB PIFLUFOLASTAT F-18, DIAGNOSTIC, 1 MILLICURIE: HCPCS

## 2022-06-30 ENCOUNTER — TELEPHONE (OUTPATIENT)
Dept: UROLOGY | Facility: AMBULATORY SURGERY CENTER | Age: 67
End: 2022-06-30

## 2022-06-30 NOTE — TELEPHONE ENCOUNTER
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

## 2022-06-30 NOTE — TELEPHONE ENCOUNTER
Radiology Test Results - Radiology Calling with report update    Pt under care of: Marsha    Significant Findings - Please review  PET SCAN done 06/29/22

## 2022-07-05 ENCOUNTER — TELEPHONE (OUTPATIENT)
Dept: OTHER | Facility: HOSPITAL | Age: 67
End: 2022-07-05

## 2022-07-05 NOTE — TELEPHONE ENCOUNTER
Patient called in wanting to speak with "Dr Milady Odom nurse"     Patient was very agitated  Would not give me a message to pass along  Patient then hung up on me       Please call patient back at [phone]

## 2022-07-05 NOTE — TELEPHONE ENCOUNTER
Spoke with patient  He stated that he had talked with the provider at radiation and he would like to hold off on radiation treatment until he talks with Dr Dolores Uriostegui as his PSA level has not gone up in 9 months  He is a seasonal  and stated that he cannot miss radiation, and he also cannot miss work either  Moved appt up to 7/21 around patient's schedule

## 2022-07-05 NOTE — TELEPHONE ENCOUNTER
Eboni Rouse called this morning stating he doesn't know why he needs to see Dr Blankenship [place]ing on 7/7  He had his Pet Scan done and just wants the results  He said "my PSA is 0 6 and I will not be starting radiation treatments until it hits 1 0 and besides I'm a  and will not do anything until at least November"  We didn't get to finish the conversation as he told me he was getting another call that he needed to take  He wants to know if he really needs to keep this appt  I would like to call him back today with an answer  Thank you

## 2022-07-21 ENCOUNTER — TELEPHONE (OUTPATIENT)
Dept: HEMATOLOGY ONCOLOGY | Facility: CLINIC | Age: 67
End: 2022-07-21

## 2022-07-21 ENCOUNTER — OFFICE VISIT (OUTPATIENT)
Dept: UROLOGY | Facility: AMBULATORY SURGERY CENTER | Age: 67
End: 2022-07-21
Payer: COMMERCIAL

## 2022-07-21 VITALS
WEIGHT: 195 LBS | OXYGEN SATURATION: 97 % | HEART RATE: 67 BPM | BODY MASS INDEX: 27.2 KG/M2 | DIASTOLIC BLOOD PRESSURE: 86 MMHG | SYSTOLIC BLOOD PRESSURE: 122 MMHG

## 2022-07-21 DIAGNOSIS — C61 PROSTATE CANCER (HCC): Primary | ICD-10-CM

## 2022-07-21 PROCEDURE — 99215 OFFICE O/P EST HI 40 MIN: CPT | Performed by: UROLOGY

## 2022-07-21 NOTE — PROGRESS NOTES
7/21/2022    [facility]  1955  [id number]        Assessment  [place] 7 prostate cancer status post robot assisted laparoscopic prostatectomy, PSA recurrence postoperatively (0 6), PSMA CT-PET scan concerning for left iliac adenopathy as well as a neck/mediastinal mass      Discussion  I had a very lengthy discussion with Marcello Morales in the office today concerning his postoperative PSA of 0 6  His postoperative PSA jesus was noted to be 0 2  We discussed reviewed his PSMA CT-PET scan  This is concerning for iliac lymph nodes on the left side  Discussed reviewed his pathology which showed negative surgical margins and negative lymph node although he did have extracapsular extension at the time of surgery in January of 2021  At this time I am strongly advocating for wide field external beam radiation therapy to treat the iliac lymph nodes  He is extremely hesitant and skeptical about radiation  I recommended a referral back to Dr Lupe Rodriguez  He also wishes to entertain oral androgen deprivation therapy and I have made a referral to Medical Oncology  With regards to the neck/mediastinal mass I have ordered a CT scan of the neck/chest/abdomen/pelvis  A repeat PSA level has been ordered as well  He will return in follow-up after the above have been completed  We discussed that radiation therapy would offer the best chance for cure of his prostate cancer and that Lupron and oral ADT will not cure his cancer but simply keep it in check for some period of time  History of Present Illness  79 y o  male with a history of Mario 7 prostate cancer  He underwent robot assisted laparoscopic prostatectomy in January 2021  All surgical margins were negative  A single lymph node was removed and was negative for malignancy  His postoperative PSA jesus was 0 2  His PSA has been slowly rising since January of 2021 and most recently is 0 6  This prompted a CT PSMA PET scan    He returns in follow-up today which shows activity within the left iliac region highly suspicious for adenopathy  There is also question of a mediastinal/neck mass and even a question of a small lesion in the calvarium but this is likely benign  He returns in follow-up today to discuss the results of the CT PET scan and to again to review his PSA of 0 6  He is dry and wears no pads  He struggles with erections but states these are starting to improve  AUA Symptom Score      Review of Systems  Review of Systems   Constitutional: Negative  HENT: Negative  Eyes: Negative  Respiratory: Negative  Cardiovascular: Negative  Gastrointestinal: Negative  Endocrine: Negative  Genitourinary:        Per HPI   Musculoskeletal: Negative  Skin: Negative  Allergic/Immunologic: Negative  Neurological: Negative  Hematological: Negative  Psychiatric/Behavioral: Negative  Past Medical History  Past Medical History:   Diagnosis Date    Anal fissure     Basal cell carcinoma (BCC)     Basal cell carcinoma (BCC) of face     Cancer (HCC)     Colon polyp     Elevated PSA     GERD (gastroesophageal reflux disease)     Prostate cancer [facility])        Past Social History  Past Surgical History:   Procedure Laterality Date    [facility]  05/2020    [facility]     EGD      HERNIA REPAIR      inguinal    ND BIOPSY OF PROSTATE,NEEDLE/PUNCH N/A 9/11/2020    Procedure: TRANSRECTAL NEEDLE BIOPSY PROSTATE (TRNBP);   Surgeon: Jorge A Orozco MD;  [facility];  Service: Urology    ND EXC SKIN BENIG 0 6-1 CM FACE,FACIAL Right 4/6/2021    Procedure: FACIAL LESION EXCISION WITH FROZEN SECTION AND MULTI LAYER CLOSURE ;  Surgeon: James Abbasi MD;  Location:  MAIN OR;  Service: General    ND LAP,PROSTATECTOMY,RADICAL,W/NERVE SPARE,INCL ROBOTIC N/A 1/20/2021    Procedure: Nilda PLASCENCIA ROBOTICS;  Surgeon: Jorge A Orozco MD;  [facility];  Service: Urology    SPERMATOCELECTOMY  02/2018 right spermatocelectomy and multiple lipoma removal    US GUIDED PROSTATE BIOPSY  2020       Past Family History  Family History   Problem Relation Age of Onset    Uterine cancer Sister        Past Social history  Social History     Socioeconomic History    Marital status: Single     Spouse name: Not on file    Number of children: Not on file    Years of education: Not on file    Highest education level: Not on file   Occupational History    Occupation: self employed   Tobacco Use    Smoking status: Former Smoker     Packs/day: 1 00     Years: 3 00     Pack years: 3 00     Quit date:      Years since quittin 5    Smokeless tobacco: Never Used   Vaping Use    Vaping Use: Never used   Substance and Sexual Activity    Alcohol use: Yes     Alcohol/week: 10 0 standard drinks     Types: 10 Cans of beer per week     Comment: Weekends    Drug use: Yes     Frequency: 2 0 times per week     Types: Marijuana     Comment: Weekends    Sexual activity: Yes     Partners: Female   Other Topics Concern    Not on file   Social History Narrative    Most recent tobacco use screenin2018    Live alone or with others: with others    Marital status: Single    Occupation: self employed    Are you currently employed:  Yes    Alcohol intake: Occasional     Social Determinants of Health     Financial Resource Strain: Not on file   Food Insecurity: Not on file   Transportation Needs: Not on file   Physical Activity: Not on file   Stress: Not on file   Social Connections: Not on file   Intimate Partner Violence: Not on file   Housing Stability: Not on file       Current Medications  Current Outpatient Medications   Medication Sig Dispense Refill    Bioflavonoid Products (STEPHANY C PO) Take 1 tablet by mouth daily   (Patient not taking: No sig reported)      docusate sodium (COLACE) 100 mg capsule Take 1 capsule (100 mg total) by mouth 2 (two) times a day (Patient not taking: No sig reported) 30 capsule 0    oxybutynin (DITROPAN) 5 mg tablet Take 1 tablet (5 mg total) by mouth 3 (three) times a day as needed (Bladder spasms, catheterization discomfort) (Patient not taking: No sig reported) 30 tablet 0    pantoprazole (PROTONIX) 40 mg tablet TAKE 1 TABLET BY MOUTH EVERY DAY (Patient not taking: No sig reported) 90 tablet 1    tadalafil (CIALIS) 5 MG tablet Take 5 mg by mouth daily as needed for erectile dysfunction (Patient not taking: No sig reported)       No current facility-administered medications for this visit  Allergies  Allergies   Allergen Reactions    Other      DUST    Tylenol [Acetaminophen]      RINGING IN EARS       Past Medical History, Social History, Family History, medications and allergies were reviewed  Vitals  Vitals:    07/21/22 0837   BP: 122/86   BP Location: Left arm   Patient Position: Sitting   Cuff Size: Adult   Pulse: 67   SpO2: 97%   Weight: 88 5 kg (195 lb)       Physical Exam  Physical Exam  On examination he is no acute distress    Gait normal   Affect normal     Results  Lab Results   Component Value Date    PSA 0 6 06/16/2022    PSA 0 6 03/23/2022    PSA 0 4 12/08/2021     Lab Results   Component Value Date    CALCIUM 8 3 01/21/2021    K 4 4 01/21/2021    CO2 25 01/21/2021     01/21/2021    BUN 13 01/21/2021    CREATININE 1 01 01/21/2021     Lab Results   Component Value Date    WBC 18 16 (H) 01/21/2021    HGB 12 2 01/21/2021    HCT 38 1 01/21/2021    MCV 92 01/21/2021     01/21/2021         Office Urine Dip  No results found for this or any previous visit (from the past 1 hour(s)) ]

## 2022-07-21 NOTE — LETTER
July 21, 2022     Dulce Becerra MD  [address] [facility] [address]    Patient: Ruben Koo   YOB: 1955   Date of Visit: 7/21/2022       Dear Dr Harjeet Casillas: Thank you for referring Ruben Koo to me for evaluation  Below are my notes for this consultation  If you have questions, please do not hesitate to call me  I look forward to following your patient along with you  Sincerely,        Santos Elkins MD        CC: Francie Greening, MD Johanne Session, RN Sarrah Adolphus, MD Ulysses Melbourne, MD Lucillie Given, MD  7/21/2022 10:12 AM  Sign when Signing Visit  7/21/2022    Ruben Koo  1955  [id number]        Assessment  [place] 7 prostate cancer status post robot assisted laparoscopic prostatectomy, PSA recurrence postoperatively (0 6), PSMA CT-PET scan concerning for left iliac adenopathy as well as a neck/mediastinal mass      Discussion  I had a very lengthy discussion with Srinivasa Wise in the office today concerning his postoperative PSA of 0 6  His postoperative PSA jesus was noted to be 0 2  We discussed reviewed his PSMA CT-PET scan  This is concerning for iliac lymph nodes on the left side  Discussed reviewed his pathology which showed negative surgical margins and negative lymph node although he did have extracapsular extension at the time of surgery in January of 2021  At this time I am strongly advocating for wide field external beam radiation therapy to treat the iliac lymph nodes  He is extremely hesitant and skeptical about radiation  I recommended a referral back to Dr Patricia Gregory  He also wishes to entertain oral androgen deprivation therapy and I have made a referral to Medical Oncology  With regards to the neck/mediastinal mass I have ordered a CT scan of the neck/chest/abdomen/pelvis  A repeat PSA level has been ordered as well  He will return in follow-up after the above have been completed    We discussed that radiation therapy would offer the best chance for cure of his prostate cancer and that Lupron and oral ADT will not cure his cancer but simply keep it in check for some period of time  History of Present Illness  79 y o  male with a history of Mario 7 prostate cancer  He underwent robot assisted laparoscopic prostatectomy in January 2021  All surgical margins were negative  A single lymph node was removed and was negative for malignancy  His postoperative PSA jesus was 0 2  His PSA has been slowly rising since January of 2021 and most recently is 0 6  This prompted a CT PSMA PET scan  He returns in follow-up today which shows activity within the left iliac region highly suspicious for adenopathy  There is also question of a mediastinal/neck mass and even a question of a small lesion in the calvarium but this is likely benign  He returns in follow-up today to discuss the results of the CT PET scan and to again to review his PSA of 0 6  He is dry and wears no pads  He struggles with erections but states these are starting to improve  AUA Symptom Score      Review of Systems  Review of Systems   Constitutional: Negative  HENT: Negative  Eyes: Negative  Respiratory: Negative  Cardiovascular: Negative  Gastrointestinal: Negative  Endocrine: Negative  Genitourinary:        Per HPI   Musculoskeletal: Negative  Skin: Negative  Allergic/Immunologic: Negative  Neurological: Negative  Hematological: Negative  Psychiatric/Behavioral: Negative            Past Medical History  Past Medical History:   Diagnosis Date    Anal fissure     Basal cell carcinoma (BCC)     Basal cell carcinoma (BCC) of face     Cancer (HCC)     Colon polyp     Elevated PSA     GERD (gastroesophageal reflux disease)     Prostate cancer [facility])        Past Social History  Past Surgical History:   Procedure Laterality Date    [facility]     EGD      HERNIA REPAIR      inguinal    NE BIOPSY OF PROSTATE,NEEDLE/PUNCH N/A 2020    Procedure: TRANSRECTAL NEEDLE BIOPSY PROSTATE (TRNBP); Surgeon: Beck Ware MD;  [facility];  Service: Urology    HI EXC SKIN BENIG 0 6-1 CM FACE,FACIAL Right 2021    Procedure: FACIAL LESION EXCISION WITH FROZEN SECTION AND MULTI LAYER CLOSURE ;  Surgeon: Sarah Guerrier MD;  Location: [facility];  Service: General    HI LAP,PROSTATECTOMY,RADICAL,W/NERVE SPARE,INCL ROBOTIC N/A 2021    Procedure: [place]  W ROBOTICS;  Surgeon: Beck Ware MD;  [facility];  Service: Urology    SPERMATOCELECTOMY  2018    right spermatocelectomy and multiple lipoma removal    US GUIDED PROSTATE BIOPSY  2020       Past Family History  Family History   Problem Relation Age of Onset    Uterine cancer Sister        Past Social history  Social History     Socioeconomic History    Marital status: Single     Spouse name: Not on file    Number of children: Not on file    Years of education: Not on file    Highest education level: Not on file   Occupational History    Occupation: self employed   Tobacco Use    Smoking status: Former Smoker     Packs/day: 1 00     Years: 3 00     Pack years: 3 00     Quit date:      Years since quittin 5    Smokeless tobacco: Never Used   Vaping Use    Vaping Use: Never used   Substance and Sexual Activity    Alcohol use: Yes     Alcohol/week: 10 0 standard drinks     Types: 10 Cans of beer per week     Comment: Weekends    Drug use: Yes     Frequency: 2 0 times per week     Types: Marijuana     Comment: Weekends    Sexual activity: Yes     Partners: Female   Other Topics Concern    Not on file   Social History Narrative    Most recent tobacco use screenin2018    Live alone or with others: with others    Marital status: Single    Occupation: self employed    Are you currently employed:  Yes    Alcohol intake: Occasional     Social Determinants of Health     Financial Resource Strain: Not on file   Food Insecurity: Not on file   Transportation Needs: Not on file   Physical Activity: Not on file   Stress: Not on file   Social Connections: Not on file   Intimate Partner Violence: Not on file   Housing Stability: Not on file       Current Medications  Current Outpatient Medications   Medication Sig Dispense Refill    Bioflavonoid Products (STEPHANY C PO) Take 1 tablet by mouth daily   (Patient not taking: No sig reported)      docusate sodium (COLACE) 100 mg capsule Take 1 capsule (100 mg total) by mouth 2 (two) times a day (Patient not taking: No sig reported) 30 capsule 0    oxybutynin (DITROPAN) 5 mg tablet Take 1 tablet (5 mg total) by mouth 3 (three) times a day as needed (Bladder spasms, catheterization discomfort) (Patient not taking: No sig reported) 30 tablet 0    pantoprazole (PROTONIX) 40 mg tablet TAKE 1 TABLET BY MOUTH EVERY DAY (Patient not taking: No sig reported) 90 tablet 1    tadalafil (CIALIS) 5 MG tablet Take 5 mg by mouth daily as needed for erectile dysfunction (Patient not taking: No sig reported)       No current facility-administered medications for this visit  Allergies  Allergies   Allergen Reactions    Other      DUST    Tylenol [Acetaminophen]      RINGING IN EARS       Past Medical History, Social History, Family History, medications and allergies were reviewed  Vitals  Vitals:    07/21/22 0837   BP: 122/86   BP Location: Left arm   Patient Position: Sitting   Cuff Size: Adult   Pulse: 67   SpO2: 97%   Weight: 88 5 kg (195 lb)       Physical Exam  Physical Exam  On examination he is no acute distress    Gait normal   Affect normal     Results  Lab Results   Component Value Date    PSA 0 6 06/16/2022    PSA 0 6 03/23/2022    PSA 0 4 12/08/2021     Lab Results   Component Value Date    CALCIUM 8 3 01/21/2021    K 4 4 01/21/2021    CO2 25 01/21/2021     01/21/2021    BUN 13 01/21/2021    CREATININE 1 01 01/21/2021     Lab Results Component Value Date    WBC 18 16 (H) 01/21/2021    HGB 12 2 01/21/2021    HCT 38 1 01/21/2021    MCV 92 01/21/2021     01/21/2021         Office Urine Dip  No results found for this or any previous visit (from the past 1 hour(s)) ]

## 2022-07-21 NOTE — TELEPHONE ENCOUNTER
----- Message from Kiet Simpson MD sent at 2022 12:54 PM EDT -----  With MP or Me 6-8 weeks  ----- Message -----  From: Tressa Machado MD  Sent: 2022   9:30 AM EDT  To: Filipe Stratton MD, Kiet iSmpson MD, #    Betzy Starkey had prostatectomy in   Post - op PSA now 0 6  PSMA scan is + for L sided LN  I am recommending XRT but he is very hesitant  He wants to discuss XRT as well as ADT with med onc  He would like to have appts at [facility] if possible and please schedule earlier in the day  Thank you      FT

## 2022-07-22 ENCOUNTER — TELEPHONE (OUTPATIENT)
Dept: HEMATOLOGY ONCOLOGY | Facility: CLINIC | Age: 67
End: 2022-07-22

## 2022-07-22 ENCOUNTER — PATIENT OUTREACH (OUTPATIENT)
Dept: HEMATOLOGY ONCOLOGY | Facility: CLINIC | Age: 67
End: 2022-07-22

## 2022-07-22 NOTE — TELEPHONE ENCOUNTER
Received call from patient regarding scheduling a consult with Dr Cassandra Aponte  Patient is confused as to why he needs to see Dr Cassandra Aponte  We discussed this in length  Patient is not interested in seeing Dr Cassandra Aponte at this time  He does not want chemotherapy and "does not want to waste the money to see a doctor for something he does not want"  Patient states he will call back if he changes his mind

## 2022-07-26 ENCOUNTER — TELEPHONE (OUTPATIENT)
Dept: SURGICAL ONCOLOGY | Facility: CLINIC | Age: 67
End: 2022-07-26

## 2022-07-26 ENCOUNTER — PATIENT OUTREACH (OUTPATIENT)
Dept: HEMATOLOGY ONCOLOGY | Facility: CLINIC | Age: 67
End: 2022-07-26

## 2022-07-26 NOTE — PROGRESS NOTES
Intake received, chart reviewed  Pathology completed: 1/20/21  Imaging completed: 6/29/22  All records needed are in patients chart  No further action required of Care Coordination team needed at this time

## 2022-07-27 ENCOUNTER — TELEPHONE (OUTPATIENT)
Dept: HEMATOLOGY ONCOLOGY | Facility: CLINIC | Age: 67
End: 2022-07-27

## 2022-07-27 ENCOUNTER — TELEPHONE (OUTPATIENT)
Dept: OTHER | Facility: HOSPITAL | Age: 67
End: 2022-07-27

## 2022-07-27 ENCOUNTER — PATIENT OUTREACH (OUTPATIENT)
Dept: HEMATOLOGY ONCOLOGY | Facility: CLINIC | Age: 67
End: 2022-07-27

## 2022-07-27 DIAGNOSIS — C61 PROSTATE CANCER (HCC): Primary | ICD-10-CM

## 2022-07-27 DIAGNOSIS — R97.20 ELEVATED PSA: ICD-10-CM

## 2022-07-27 NOTE — TELEPHONE ENCOUNTER
Pt called and does not want to meet with radiation  He has researched and discussed with others and would like to discuss other options with Dr María Garcia in September as scheduled  Scheduled to see Dr Nilda Bhakta 9/9/2022 and Dr María Garcia 9/19/22  Should Dr Nilda Bhakta visit be moved to after consult with Dr María Garcia? Thank you!

## 2022-07-27 NOTE — TELEPHONE ENCOUNTER
Pt scheduled to see Dr Sumi Putnam 9/12/2022  Please move Dr Belkis Charlton visit until after he sees Dr Sumi Putnam  Reviewed with Dr Belkis Charlton  Thank you!

## 2022-07-27 NOTE — TELEPHONE ENCOUNTER
I left a vm for patient on 7/22 with an appt date and time to see Dr Blade Guerra  I called and left him another  asking him to please return my call as I would like to know if he was keeping the appt  He did not return my call  I reached out to [facility] [employer]  She left vm's and he returned her call stating he was not keeping the appt as he wants to see Dr Rosie Hayes first in September

## 2022-07-27 NOTE — PROGRESS NOTES
Outreach to pt about scheduling Medical Oncology visit  Pt states he does not want to meet with Medical oncology and he did not agree to this  States his PSA is barely elevated and thinks this is not necessary  Reinforced these are Dr Citlali Rodriguez recommendations, however, he adamantly declined      Dr Amie Cason made aware

## 2022-07-27 NOTE — PROGRESS NOTES
Left message on cell phone and home phone for pt stating I was calling to make sure he was aware of Radiation Oncology appt as they have been trying to reach him  Provided appt information and requested call back to confirm with either myself or [facility]  Pt called back and states he does not want to meet with radiation  He has researched and discussed with others and does not want radiation  Agreeable to meeting with Dr Oscar Russ

## 2022-07-28 ENCOUNTER — PATIENT OUTREACH (OUTPATIENT)
Dept: HEMATOLOGY ONCOLOGY | Facility: CLINIC | Age: 67
End: 2022-07-28

## 2022-07-28 NOTE — PROGRESS NOTES
Left voicemail for pt with appt change to 9/23/22 with Dr Ana De Guzman    Requested call back with any questions/concerns

## 2022-08-11 ENCOUNTER — PATIENT OUTREACH (OUTPATIENT)
Dept: HEMATOLOGY ONCOLOGY | Facility: CLINIC | Age: 67
End: 2022-08-11

## 2022-08-11 DIAGNOSIS — C61 PROSTATE CANCER (HCC): Primary | ICD-10-CM

## 2022-08-11 NOTE — PROGRESS NOTES
Call to pt to remind him to get labs drawn prior to his visit with Dr Sherman Jimenez since appt had been moved up    Requested call back with any questions/concerns

## 2022-08-18 ENCOUNTER — APPOINTMENT (OUTPATIENT)
Dept: LAB | Facility: HOSPITAL | Age: 67
End: 2022-08-18
Attending: UROLOGY
Payer: COMMERCIAL

## 2022-08-18 DIAGNOSIS — R97.20 ELEVATED PSA: ICD-10-CM

## 2022-08-18 DIAGNOSIS — C61 PROSTATE CANCER (HCC): ICD-10-CM

## 2022-08-18 LAB
ALBUMIN SERPL BCP-MCNC: 4.7 G/DL (ref 3.5–5)
ALP SERPL-CCNC: 72 U/L (ref 34–104)
ALT SERPL W P-5'-P-CCNC: 21 U/L (ref 7–52)
ANION GAP SERPL CALCULATED.3IONS-SCNC: 10 MMOL/L (ref 4–13)
AST SERPL W P-5'-P-CCNC: 23 U/L (ref 13–39)
BASOPHILS # BLD AUTO: 0.05 THOUSANDS/ΜL (ref 0–0.1)
BASOPHILS NFR BLD AUTO: 1 % (ref 0–1)
BILIRUB SERPL-MCNC: 0.97 MG/DL (ref 0.2–1)
BUN SERPL-MCNC: 18 MG/DL (ref 5–25)
CALCIUM SERPL-MCNC: 10 MG/DL (ref 8.4–10.2)
CHLORIDE SERPL-SCNC: 103 MMOL/L (ref 96–108)
CHOLEST SERPL-MCNC: 187 MG/DL
CO2 SERPL-SCNC: 26 MMOL/L (ref 21–32)
CREAT SERPL-MCNC: 1.24 MG/DL (ref 0.6–1.3)
EOSINOPHIL # BLD AUTO: 0.12 THOUSAND/ΜL (ref 0–0.61)
EOSINOPHIL NFR BLD AUTO: 1 % (ref 0–6)
ERYTHROCYTE [DISTWIDTH] IN BLOOD BY AUTOMATED COUNT: 13.4 % (ref 11.6–15.1)
GFR SERPL CREATININE-BSD FRML MDRD: 59 ML/MIN/1.73SQ M
GLUCOSE P FAST SERPL-MCNC: 84 MG/DL (ref 65–99)
HCT VFR BLD AUTO: 47.1 % (ref 36.5–49.3)
HDLC SERPL-MCNC: 55 MG/DL
HGB BLD-MCNC: 15.6 G/DL (ref 12–17)
IMM GRANULOCYTES # BLD AUTO: 0.04 THOUSAND/UL (ref 0–0.2)
IMM GRANULOCYTES NFR BLD AUTO: 0 % (ref 0–2)
LDLC SERPL CALC-MCNC: 111 MG/DL (ref 0–100)
LYMPHOCYTES # BLD AUTO: 2.01 THOUSANDS/ΜL (ref 0.6–4.47)
LYMPHOCYTES NFR BLD AUTO: 21 % (ref 14–44)
MCH RBC QN AUTO: 29.8 PG (ref 26.8–34.3)
MCHC RBC AUTO-ENTMCNC: 33.1 G/DL (ref 31.4–37.4)
MCV RBC AUTO: 90 FL (ref 82–98)
MONOCYTES # BLD AUTO: 0.73 THOUSAND/ΜL (ref 0.17–1.22)
MONOCYTES NFR BLD AUTO: 8 % (ref 4–12)
NEUTROPHILS # BLD AUTO: 6.46 THOUSANDS/ΜL (ref 1.85–7.62)
NEUTS SEG NFR BLD AUTO: 69 % (ref 43–75)
NONHDLC SERPL-MCNC: 132 MG/DL
NRBC BLD AUTO-RTO: 0 /100 WBCS
PLATELET # BLD AUTO: 233 THOUSANDS/UL (ref 149–390)
PMV BLD AUTO: 10.9 FL (ref 8.9–12.7)
POTASSIUM SERPL-SCNC: 4.4 MMOL/L (ref 3.5–5.3)
PROT SERPL-MCNC: 7.4 G/DL (ref 6.4–8.4)
PSA SERPL-MCNC: 0.9 NG/ML (ref 0–4)
RBC # BLD AUTO: 5.24 MILLION/UL (ref 3.88–5.62)
SODIUM SERPL-SCNC: 139 MMOL/L (ref 135–147)
TRIGL SERPL-MCNC: 103 MG/DL
WBC # BLD AUTO: 9.41 THOUSAND/UL (ref 4.31–10.16)

## 2022-08-18 PROCEDURE — 36415 COLL VENOUS BLD VENIPUNCTURE: CPT

## 2022-08-18 PROCEDURE — 85025 COMPLETE CBC W/AUTO DIFF WBC: CPT

## 2022-08-18 PROCEDURE — 80053 COMPREHEN METABOLIC PANEL: CPT

## 2022-08-18 PROCEDURE — 80061 LIPID PANEL: CPT

## 2022-08-18 PROCEDURE — 84153 ASSAY OF PSA TOTAL: CPT

## 2022-08-19 ENCOUNTER — DOCUMENTATION (OUTPATIENT)
Dept: HEMATOLOGY ONCOLOGY | Facility: CLINIC | Age: 67
End: 2022-08-19

## 2022-08-19 ENCOUNTER — PATIENT OUTREACH (OUTPATIENT)
Dept: HEMATOLOGY ONCOLOGY | Facility: CLINIC | Age: 67
End: 2022-08-19

## 2022-08-19 ENCOUNTER — CONSULT (OUTPATIENT)
Dept: HEMATOLOGY ONCOLOGY | Facility: CLINIC | Age: 67
End: 2022-08-19
Payer: COMMERCIAL

## 2022-08-19 VITALS
HEIGHT: 71 IN | DIASTOLIC BLOOD PRESSURE: 90 MMHG | BODY MASS INDEX: 27.3 KG/M2 | OXYGEN SATURATION: 98 % | SYSTOLIC BLOOD PRESSURE: 120 MMHG | RESPIRATION RATE: 16 BRPM | TEMPERATURE: 95.7 F | WEIGHT: 195 LBS | HEART RATE: 65 BPM

## 2022-08-19 DIAGNOSIS — C61 PROSTATE CANCER (HCC): Primary | ICD-10-CM

## 2022-08-19 PROCEDURE — 99205 OFFICE O/P NEW HI 60 MIN: CPT | Performed by: INTERNAL MEDICINE

## 2022-08-19 RX ORDER — PREDNISONE 1 MG/1
5 TABLET ORAL 2 TIMES DAILY WITH MEALS
Qty: 60 TABLET | Refills: 11 | Status: SHIPPED | OUTPATIENT
Start: 2022-08-19

## 2022-08-19 NOTE — PROGRESS NOTES
Left voicemail for pt stating I was calling to review his appt with Dr Katia Tillman  Noted that they discussed the oral treatment and also radiation and wanted to offer assistance with getting scheduled  Requested call back

## 2022-08-19 NOTE — PROGRESS NOTES
Oncology Consult Note  Jonathan Sneed 79 y o  male MRN: [id number]  Unit/Bed#:  Encounter: [id number]      Presenting Complaint:  Metastatic prostate cancer castration sensitive    History of Presenting Illness:  66-year-old  male with past medical history of prostate cancer status post total prostatectomy in 2021 Mario score 4+3=7, grade group 3 with extra prostatic extension, perineural extension no evidence of lymphovascular invasion, 1-lymph node stage II (pT3a, pN0) when he presented with PSA of 7 9    After surgery PSA went down to jesus of 0 2 he declined adjuvant Lupron    Subsequent PSA went up to 0 6 on 2022, PSMA scan showed avid lymph nodes in the left hemipelvis suspicious for metastatic disease, mild uptake in the small mass posterior to the upper trachea in the lower neck, 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    His case discussed in the tumor conference and to start the patient on Lupron plus androgen inhibitors and possible radiation oncology consult    He denies any headache blurred vision nausea vomiting diarrhea constipation dysuria hematuria melena hematochezia weight loss or bone pain    His sister diagnosed with uterus cancer, his mother  with GI cancer, his father had bladder cancer    He drinks on the weekend, he smokes weed  Review of Systems - As stated in the HPI otherwise the fourteen point review of systems was negative      Past Medical History:   Diagnosis Date    Anal fissure     Basal cell carcinoma (BCC)     Basal cell carcinoma (BCC) of face     Cancer (HCC)     Colon polyp     Elevated PSA     GERD (gastroesophageal reflux disease)     Prostate cancer (HCC)        Social History     Socioeconomic History    Marital status: Single     Spouse name: None    Number of children: None    Years of education: None    Highest education level: None   Occupational History    Occupation: self employed   Tobacco Use    Smoking status: Former Smoker Packs/day: 1 00     Years: 3 00     Pack years: 3 00     Quit date: 80     Years since quittin 6    Smokeless tobacco: Never Used   Vaping Use    Vaping Use: Never used   Substance and Sexual Activity    Alcohol use: Yes     Alcohol/week: 10 0 standard drinks     Types: 10 Cans of beer per week     Comment: Weekends    Drug use: Yes     Frequency: 2 0 times per week     Types: Marijuana     Comment: Weekends    Sexual activity: Yes     Partners: Female   Other Topics Concern    None   Social History Narrative    Most recent tobacco use screenin2018    Live alone or with others: with others    Marital status: Single    Occupation: self employed    Are you currently employed: Yes    Alcohol intake: Occasional     Social Determinants of Health     Financial Resource Strain: Not on file   Food Insecurity: Not on file   Transportation Needs: Not on file   Physical Activity: Not on file   Stress: Not on file   Social Connections: Not on file   Intimate Partner Violence: Not on file   Housing Stability: Not on file       Family History   Problem Relation Age of Onset    Uterine cancer Sister        Allergies   Allergen Reactions    Other      DUST    Tylenol [Acetaminophen]      RINGING IN EARS       No current outpatient medications on file  /90 (BP Location: Left arm, Patient Position: Sitting, Cuff Size: Adult)   Pulse 65   Temp (!) 95 7 °F (35 4 °C) (Tympanic)   Resp 16   Ht 5' 11" (1 803 m)   Wt 88 5 kg (195 lb)   SpO2 98%   BMI 27 20 kg/m²       General Appearance:    Alert, oriented        Eyes:    PERRL   Ears:    Normal external ear canals, both ears   Nose:   Nares normal, septum midline   Throat:   Mucosa moist  Pharynx without injection      Neck:   Supple       Lungs:     Clear to auscultation bilaterally   Chest Wall:    No tenderness or deformity    Heart:    Regular rate and rhythm       Abdomen:     Soft, non-tender, bowel sounds +, no organomegaly Extremities:   Extremities no cyanosis or edema       Skin:   no rash or icterus      Lymph nodes:   Cervical, supraclavicular, and axillary nodes normal   Neurologic:   CNII-XII intact, normal strength, sensation and reflexes     Throughout               Recent Results (from the past 48 hour(s))   Comprehensive metabolic panel    Collection Time: 08/18/22  2:52 PM   Result Value Ref Range    Sodium 139 135 - 147 mmol/L    Potassium 4 4 3 5 - 5 3 mmol/L    Chloride 103 96 - 108 mmol/L    CO2 26 21 - 32 mmol/L    ANION GAP 10 4 - 13 mmol/L    BUN 18 5 - 25 mg/dL    Creatinine 1 24 0 60 - 1 30 mg/dL    Glucose, Fasting 84 65 - 99 mg/dL    Calcium 10 0 8 4 - 10 2 mg/dL    AST 23 13 - 39 U/L    ALT 21 7 - 52 U/L    Alkaline Phosphatase 72 34 - 104 U/L    Total Protein 7 4 6 4 - 8 4 g/dL    Albumin 4 7 3 5 - 5 0 g/dL    Total Bilirubin 0 97 0 20 - 1 00 mg/dL    eGFR 59 ml/min/1 73sq m   Lipid panel    Collection Time: 08/18/22  2:52 PM   Result Value Ref Range    Cholesterol 187 See Comment mg/dL    Triglycerides 103 See Comment mg/dL    HDL, Direct 55 >=40 mg/dL    LDL Calculated 111 (H) 0 - 100 mg/dL    Non-HDL-Chol (CHOL-HDL) 132 mg/dl   PSA Total, Diagnostic    Collection Time: 08/18/22  2:52 PM   Result Value Ref Range    PSA, Diagnostic 0 9 0 0 - 4 0 ng/mL   CBC and differential    Collection Time: 08/18/22  2:52 PM   Result Value Ref Range    WBC 9 41 4 31 - 10 16 Thousand/uL    RBC 5 24 3 88 - 5 62 Million/uL    Hemoglobin 15 6 12 0 - 17 0 g/dL    Hematocrit 47 1 36 5 - 49 3 %    MCV 90 82 - 98 fL    MCH 29 8 26 8 - 34 3 pg    MCHC 33 1 31 4 - 37 4 g/dL    RDW 13 4 11 6 - 15 1 %    MPV 10 9 8 9 - 12 7 fL    Platelets 927 063 - 357 Thousands/uL    nRBC 0 /100 WBCs    Neutrophils Relative 69 43 - 75 %    Immat GRANS % 0 0 - 2 %    Lymphocytes Relative 21 14 - 44 %    Monocytes Relative 8 4 - 12 %    Eosinophils Relative 1 0 - 6 %    Basophils Relative 1 0 - 1 %    Neutrophils Absolute 6 46 1 85 - 7 62 Thousands/µL Immature Grans Absolute 0 04 0 00 - 0 20 Thousand/uL    Lymphocytes Absolute 2 01 0 60 - 4 47 Thousands/µL    Monocytes Absolute 0 73 0 17 - 1 22 Thousand/µL    Eosinophils Absolute 0 12 0 00 - 0 61 Thousand/µL    Basophils Absolute 0 05 0 00 - 0 10 Thousands/µL         No results found  ECOG :0      Assessment and plan:    71-year-old  male who was diagnosed with Mario score 3+4=7 grade 3 prostate cancer, PSA 7 9 in January 2021 status post robotic prostatectomy, with positive perineural invasion and capsular invasion, he declined Lupron adjuvant therapy or radiation therapy    PSA went down to jesus of 0 2 however with gradual increase in value to 0 6 in June 2022    PSMA scan showed uptake in the left iliac area highly suspicious for metastatic disease, uptake in the upper trachea area, he told me he had EGD without any significant finding except for Preston's esophagus    Patient scheduled for CT scan of the neck to assess the upper retrotracheal lymph node    1  Patient to start Lupron by Urology    2  Patient to start on abiraterone 250 mg 4 tablets every day with prednisone 5 mg p o  b i d , side effects such as fatigue, nausea, vomiting, elevated liver enzymes, gynecomastia, skin rash etc   told he agreed he signed the consent    3  Patient to follow-up with radiation oncology for oligo metastases radiation therapy  4  Strong family history of cancer with sister with uterus cancer, his mother with cancer of GI origin, father with bladder cancer, will send the patient for genetic counselor    5   Follow-up with PSA every 2 month initially, CBC, CMP

## 2022-08-19 NOTE — PROGRESS NOTES
Zytiga 1000mg daily (250mg tabs)      Help Desk- [phone]    ID- [id number]  BIN: [id number] / DEVORA: Marty Gregorio    SUBMITTED VIA COVERMYMEDS:  CASE# [id number]    8/23/22  APPROVED

## 2022-08-22 NOTE — PROGRESS NOTES
8-19-22  [facility] new oral chemo start- ZYTIGA// Auth is required    8-22-22  Completed jj application for free drug  Forwarded to provider office for signature  8-23-22  vd copay amt of $ [phone] 64 // Call placed to patient to discuss copay assistance/free drug  No response left message for return call      8-24-22  Rcvd return call from patient  Discussed free drug program  Timeframe, documents required    Patient portion rcvd with required documents    Application faxed to [facility]

## 2022-08-24 ENCOUNTER — PATIENT OUTREACH (OUTPATIENT)
Dept: HEMATOLOGY ONCOLOGY | Facility: CLINIC | Age: 67
End: 2022-08-24

## 2022-08-24 ENCOUNTER — TELEPHONE (OUTPATIENT)
Dept: GENETICS | Facility: CLINIC | Age: 67
End: 2022-08-24

## 2022-08-24 NOTE — TELEPHONE ENCOUNTER
I called Ronny to schedule a new patient appointment with the Cancer Risk and Genetics Program       Outcome:   Spoke with patient, genetics appointment schedule for 9/9 at 9:30 via televisit  Patient requested I mail him a copy of his appointment information and brochure; placed in mail  Pt has a hx of prostate cancer, basal cell carcinoma, hx of colon polyps  I was not able to collect a family hx as patient was in the middle of closing a job

## 2022-08-24 NOTE — PROGRESS NOTES
Received message from pt stating he has questions and requesting call back  Incoming call from pt  States Dr Chelsea Perry has ordered oral ADT which he is dealing with [employer] about getting funding  Agreeable to starting Lupron, however, declined seeing RadOnc until he sees how the cancer responds to the new treatments  Reinforced RadOnc again is advised due to rising PSA  He said he will wait  Requesting to be scheduled for Lupron for an appt at the very end of the day

## 2022-08-29 ENCOUNTER — TELEPHONE (OUTPATIENT)
Dept: HEMATOLOGY ONCOLOGY | Facility: MEDICAL CENTER | Age: 67
End: 2022-08-29

## 2022-08-29 NOTE — TELEPHONE ENCOUNTER
Received call from pt stating he had a voicemail from [employer] asking him to call back, but he couldn't understand the phone number  Message sent to oral chemo team to see if they have this number

## 2022-08-30 DIAGNOSIS — C61 PROSTATE CANCER (HCC): Primary | ICD-10-CM

## 2022-08-30 RX ORDER — ABIRATERONE ACETATE 250 MG/1
1000 TABLET ORAL DAILY
Qty: 120 TABLET | Refills: 3 | Status: SHIPPED | OUTPATIENT
Start: 2022-08-30

## 2022-08-31 ENCOUNTER — TELEPHONE (OUTPATIENT)
Dept: UROLOGY | Facility: MEDICAL CENTER | Age: 67
End: 2022-08-31

## 2022-08-31 NOTE — TELEPHONE ENCOUNTER
I called Parish [facility] ([phone]) to check benefits  Spoke with [employer]  She stated that NO authorization is required for either Lupron/Eligard or Firmagon  Office stock is okay to use  Reference#: [id number]

## 2022-09-02 ENCOUNTER — PATIENT OUTREACH (OUTPATIENT)
Dept: HEMATOLOGY ONCOLOGY | Facility: CLINIC | Age: 67
End: 2022-09-02

## 2022-09-02 ENCOUNTER — TELEPHONE (OUTPATIENT)
Dept: HEMATOLOGY ONCOLOGY | Facility: CLINIC | Age: 67
End: 2022-09-02

## 2022-09-02 NOTE — PROGRESS NOTES
Received message from pt requesting call back  Returned call to pt  He is inquiring about Lupron injection scheduling  Reinforced multi-modal approach per Dr Tadeo Keller and reinforced recommendations of XRT  with potential for cure  Pt states he has reviewed information on oral medication and is  declining Oral ADT but agreeable to XRT and he reached out to Dr Cas Mckenna to get scheduled  Reinforced all recommendations with multi-modal approach with potential for cure and pt very concerned about side effects or oral treatment  Explained that everyone tolerates it differently and if he were to start and not tolerate it can be discontinued  Pt has received the medication but will wait to further discuss with Dr Tadeo Keller on 9/23/22

## 2022-09-02 NOTE — TELEPHONE ENCOUNTER
Pt is now declining Oral ADT but agreeable to XRT and he reached out to Dr Elsie Alves to get scheduled  Reinforced all recommendations with multi-modal approach with potential for cure and pt very concerned about side effects or oral treatment  Explained that everyone tolerates it differently and if he were to start and not tolerate it can be discontinued  Pt has received the medication but will wait to further discuss taking it with Dr Eddie Rodney on 9/23/22  Do you want to do Lupron with 9/23/22 visit or bring him in sooner? Thank you!

## 2022-09-07 ENCOUNTER — TELEPHONE (OUTPATIENT)
Dept: HEMATOLOGY ONCOLOGY | Facility: CLINIC | Age: 67
End: 2022-09-07

## 2022-09-07 NOTE — TELEPHONE ENCOUNTER
Pt is seeing Dr Tomas Lomeli with [facility] on 9/22/2022  Dr Freda West 9/23/2022    Will pt receive Lupron at that visit or does he need to brought in prior? Thank you!

## 2022-09-08 ENCOUNTER — DOCUMENTATION (OUTPATIENT)
Dept: HEMATOLOGY ONCOLOGY | Facility: CLINIC | Age: 67
End: 2022-09-08

## 2022-09-08 NOTE — PROGRESS NOTES
Received Fax from [facility] asking for additional information on Tumor Marker Panel  Call placed to [facility], spoke with Derrick colindres who stated they are having technical difficulties as their system is down and they cannot see any cases  She asked I call back later  Addend 9/8/22 1:01    Spoke with [employer] who verified pending denial is for Genomic testing     Urgent e-mail sent to [facility] from [facility] to review pending denial

## 2022-09-09 ENCOUNTER — DOCUMENTATION (OUTPATIENT)
Dept: GENETICS | Facility: CLINIC | Age: 67
End: 2022-09-09

## 2022-09-09 ENCOUNTER — CLINICAL SUPPORT (OUTPATIENT)
Dept: GENETICS | Facility: CLINIC | Age: 67
End: 2022-09-09

## 2022-09-09 DIAGNOSIS — Z80.0 FAMILY HISTORY OF STOMACH CANCER: ICD-10-CM

## 2022-09-09 DIAGNOSIS — Z80.49 FAMILY HISTORY OF UTERINE CANCER: ICD-10-CM

## 2022-09-09 DIAGNOSIS — C61 PROSTATE CANCER (HCC): Primary | ICD-10-CM

## 2022-09-09 PROCEDURE — NC001 PR NO CHARGE: Performed by: GENETIC COUNSELOR, MS

## 2022-09-09 NOTE — LETTER
2022     Eliud Parkinson MD  [address] [place] [address]    Patient: Ken Clancy  YOB: 1955  Date of Visit: 2022      Dear Dr Sudarshan Michele: Thank you for referring Ken Clancy to me for evaluation  Below are my notes for this consultation  If you have questions, please do not hesitate to call me  I look forward to following your patient along with you  Sincerely,        Vanessa Urrutia GC        CC: No Recipients        Pre-Test Genetic Counseling Consult Note    Patient Name: Ken Clancy   /Age: 1955/67 y o  Referring Provider: Eliud Parkinson MD    Date of Service: 2022  Genetic Counselor: Dulce Oviedo MS, [facility]  Interpretation Services: None  Location: Telephone consult   Length of Visit: 61 minutes      Leesa Juares was referred to the [address] [place] [address] and Genetic Assessment Program due to his personal history of metastatic prostate cancer and family history of cancer  He presents today to discuss the possibility of a hereditary cancer syndrome, options for genetic testing, and implications for him and his family       Cancer History and Treatment:      Personal History: Personal history of BCC and metastatic prostate cancer (castration sensitive) diagnosed at age 77    Diagnosed with Mario score 3+4=7 grade 3 prostate cancer, PSA 7 9 in 2021 status post robotic prostatectomy, with positive perineural invasion and capsular invasion, he declined Lupron adjuvant therapy       PSMA scan showed uptake in the left iliac area highly suspicious for metastatic disease, uptake in the upper trachea area, he told me he had EGD without any significant finding except for Preston's esophagus    Screening Hx:     Colon:  Colonoscopy: Most recent colonoscopy in 2019; hx of some polyps     Medical and Surgical History  Pertinent surgical history:   Past Surgical History:   Procedure Laterality Date    [facility]  2020    [facility]     EGD  HERNIA REPAIR      inguinal    [place] BIOPSY OF PROSTATE,NEEDLE/PUNCH N/A 9/11/2020    Procedure: TRANSRECTAL NEEDLE BIOPSY PROSTATE (TRNBP); Surgeon: Beck Ware MD;  [facility];  Service: Urology    ID EXC SKIN BENIG 0 6-1 CM FACE,FACIAL Right 4/6/2021    Procedure: FACIAL LESION EXCISION WITH FROZEN SECTION AND MULTI LAYER CLOSURE ;  Surgeon: Sarah Guerrier MD;  Location:  MAIN OR;  Service: General    ID LAP,PROSTATECTOMY,RADICAL,W/NERVE SPARE,INCL ROBOTIC N/A 1/20/2021    Procedure: [place] [facility]  W ROBOTICS;  Surgeon: Beck Ware MD;  [facility];  Service: Urology    SPERMATOCELECTOMY  02/2018    right spermatocelectomy and multiple lipoma removal    US GUIDED PROSTATE BIOPSY  9/11/2020      Pertinent medical history:  Past Medical History:   Diagnosis Date    Anal fissure     Basal cell carcinoma (BCC)     Basal cell carcinoma (BCC) of face     [facility] [address]     Colon polyp     Elevated PSA     GERD (gastroesophageal reflux disease)     Prostate cancer ([facility] [address] [facility]        Other History:  Height:   Ht Readings from Last 1 Encounters:   08/19/22 5' 11" (1 803 m)     Weight:   Wt Readings from Last 1 Encounters:   08/19/22 88 5 kg (195 lb)     Relevant Family History           Please refer to the scanned pedigree in the Media Tab for a complete family history     *All history is reported as provided by the patient; records are not available for review, except where indicated  Assessment:  We discussed sporadic and hereditary cancer  Genetic testing is indicated for Ronny based on the following criteria: Meets NCCN V2 2022 Testing Criteria for Prostate Cancer Susceptibility Genes: Personal history of metastatic prostate cancer     We briefly discussed Cardona syndrome as Betzy Starkey has several relatives with a Cardona-syndrome related cancer including a sister with uterine, father with bladder and paternal grandfather with stomach    His mother also had a GI cancer  There are other relatives including paternal aunts and possibly maternal cousins but Pan Rosenberg does not know the type of cancer for these relatives  The risks, benefits, and limitations of genetic testing were reviewed with the patient, as well as genetic discrimination laws, and possible test results (positive, negative, variants of uncertain significance) and their clinical implications  If positive for a mutation, options for managing cancer risk including increased surveillance, chemoprevention, and in some cases prophylactic surgery were discussed  Panestefany Rosenberg was informed that if a hereditary cancer syndrome was identified in him, first degree relatives (parents, siblings, and children) have a chance of also inheriting the condition  Genetic testing would allow for predictive genetic testing in other relatives, who may also be at risk depending on their degree of relation  Plan: Patient decided to proceed with testing and provided consent  Summary:     Sample Collection:  A blood kit was mailed home to the patient    Genetic Testing Preformed: Doris Ramírez + RNA (36 genes): APC, CONSTANTINE, AXIN2 BARD1, BRCA1, BRCA2, BRIP1, BMPR1A, CDH1, CDK4, CDKN2A, CHEK2, DICER1, EPCAM, GREM1, HOXB13, MLH1, MSH2, MSH3, MSH6, MUTYH, NBN, NF1, NTHL1, PALB2, PMS2, POLD1, POLE, PTEN, RAD51C, RAD51D, RECQL SMAD4, SMARCA4, STK11, TP53    Results take approximately 2-3 weeks to complete once test is started  We will contact Pan Rosenberg once results are available  Additional recommendations for surveillance/medical management will be made pending genetic test results

## 2022-09-09 NOTE — PROGRESS NOTES
Pre-Test Genetic Counseling Consult Note    Patient Name: Priscila Mcgowan   /Age: 1955/67 y o  Referring Provider: Preet Mejias MD    Date of Service: 2022  Genetic Counselor: Vandana El MS, [facility]  Interpretation Services: None  Location: Telephone consult   Length of Visit: 61 minutes      Afia Brewster was referred to the [address] [place] [address] and Genetic Assessment Program due to his personal history of metastatic prostate cancer and family history of cancer  He presents today to discuss the possibility of a hereditary cancer syndrome, options for genetic testing, and implications for him and his family  Cancer History and Treatment:      Personal History: Personal history of BCC and metastatic prostate cancer (castration sensitive) diagnosed at age 77    Diagnosed with Mario score 3+4=7 grade 3 prostate cancer, PSA 7 9 in 2021 status post robotic prostatectomy, with positive perineural invasion and capsular invasion, he declined Lupron adjuvant therapy       PSMA scan showed uptake in the left iliac area highly suspicious for metastatic disease, uptake in the upper trachea area, he told me he had EGD without any significant finding except for Preston's esophagus    Screening Hx:     Colon:  Colonoscopy: Most recent colonoscopy in 2019; hx of some polyps     Medical and Surgical History  Pertinent surgical history:   Past Surgical History:   Procedure Laterality Date    [facility]  2020    [facility]     EGD      HERNIA REPAIR      inguinal    [place] BIOPSY OF PROSTATE,NEEDLE/PUNCH N/A 2020    Procedure: TRANSRECTAL NEEDLE BIOPSY PROSTATE (TRNBP);   Surgeon: Houston Orr MD;  [facility];  Service: Urology    [place] EXC SKIN BENIG 0 6-1 CM FACE,FACIAL Right 2021    Procedure: FACIAL LESION EXCISION WITH FROZEN SECTION AND MULTI LAYER CLOSURE ;  Surgeon: Sue Koehler MD;  Location:  MAIN OR;  Service: General    [place] [address] ROBOTIC N/A 1/20/2021    Procedure: PROSTATECTOMY RADICAL LAPAROSCOPIC  W ROBOTICS;  Surgeon: Omid Gonsalves MD;  [facility];  Service: Urology    SPERMATOCELECTOMY  02/2018    right spermatocelectomy and multiple lipoma removal    US GUIDED PROSTATE BIOPSY  9/11/2020      Pertinent medical history:  Past Medical History:   Diagnosis Date    Anal fissure     Basal cell carcinoma (BCC)     Basal cell carcinoma (BCC) of face     Cancer [facility] [address]     Colon polyp     Elevated PSA     GERD (gastroesophageal reflux disease)     Prostate cancer ([facility] [address] [facility]        Other History:  Height:   Ht Readings from Last 1 Encounters:   08/19/22 5' 11" (1 803 m)     Weight:   Wt Readings from Last 1 Encounters:   08/19/22 88 5 kg (195 lb)     Relevant Family History           Please refer to the scanned pedigree in the Media Tab for a complete family history     *All history is reported as provided by the patient; records are not available for review, except where indicated  Assessment:  We discussed sporadic and hereditary cancer  Genetic testing is indicated for Ronny based on the following criteria: Meets NCCN V2 2022 Testing Criteria for Prostate Cancer Susceptibility Genes: Personal history of metastatic prostate cancer     We briefly discussed Cardona syndrome as Delmy Patricio has several relatives with a Cardona-syndrome related cancer including a sister with uterine, father with bladder and paternal grandfather with stomach  His mother also had a GI cancer  There are other relatives including paternal aunts and possibly maternal cousins but Delmy Patricio does not know the type of cancer for these relatives  The risks, benefits, and limitations of genetic testing were reviewed with the patient, as well as genetic discrimination laws, and possible test results (positive, negative, variants of uncertain significance) and their clinical implications   If positive for a mutation, options for managing cancer risk including increased surveillance, chemoprevention, and in some cases prophylactic surgery were discussed  Cicero was informed that if a hereditary cancer syndrome was identified in him, first degree relatives (parents, siblings, and children) have a chance of also inheriting the condition  Genetic testing would allow for predictive genetic testing in other relatives, who may also be at risk depending on their degree of relation  Plan: Patient decided to proceed with testing and provided consent  Summary:     Sample Collection:  A blood kit was mailed home to the patient    Genetic Testing Preformed: Glorietta Phalen + RNA (36 genes): APC, CONSTANTINE, AXIN2 BARD1, BRCA1, BRCA2, BRIP1, BMPR1A, CDH1, CDK4, CDKN2A, CHEK2, DICER1, EPCAM, GREM1, HOXB13, MLH1, MSH2, MSH3, MSH6, MUTYH, NBN, NF1, NTHL1, PALB2, PMS2, POLD1, POLE, PTEN, RAD51C, RAD51D, RECQL SMAD4, SMARCA4, STK11, TP53    Results take approximately 2-3 weeks to complete once test is started  We will contact Cicero once results are available  Additional recommendations for surveillance/medical management will be made pending genetic test results

## 2022-09-13 ENCOUNTER — TELEPHONE (OUTPATIENT)
Dept: UROLOGY | Facility: AMBULATORY SURGERY CENTER | Age: 67
End: 2022-09-13

## 2022-09-13 NOTE — TELEPHONE ENCOUNTER
LM for patient to see if he can come in 9/22 in the morning in [facility] with Dr Alanna Flowers  He is to call back

## 2022-09-15 ENCOUNTER — PATIENT OUTREACH (OUTPATIENT)
Dept: HEMATOLOGY ONCOLOGY | Facility: CLINIC | Age: 67
End: 2022-09-15

## 2022-09-15 ENCOUNTER — TELEPHONE (OUTPATIENT)
Dept: HEMATOLOGY ONCOLOGY | Facility: CLINIC | Age: 67
End: 2022-09-15

## 2022-09-15 NOTE — PROGRESS NOTES
Left voicemail on mobile phone and home number for pt stating CT scan was rescheduled to tomorrow, 9/16/22 at 3pm at [facility]  If this appt does not work, please call to cancel [phone] and reschedule  The availability is limited and he may not be able to get in until October  Pt returned call and is agreeable to appt

## 2022-09-15 NOTE — PROGRESS NOTES
Incoming call from pt stating he received a call from the urology office about moving his appt from 9/23/22 to 9/22/22 in the morning, however, he will not be able to change his appt due to Dr David Parry that morning  Pt questioning if he needs PSA prior to appts  Informed him he had labs drawn on 8/18/2022 and will most likely not need repeated, but will confirm with Dr Galina Holt and call back        Returned call to pt and informed him he does not need repeat labs and message was sent to Clinical team making them aware that he is unable to change to appt date offered on 9/13/22

## 2022-09-16 ENCOUNTER — PATIENT OUTREACH (OUTPATIENT)
Dept: HEMATOLOGY ONCOLOGY | Facility: CLINIC | Age: 67
End: 2022-09-16

## 2022-09-16 NOTE — PROGRESS NOTES
Chart review completed for Tumor board prep and documentation for upcoming Working Group on 9/20/2022

## 2022-09-16 NOTE — PROGRESS NOTES
In-basket message received from Dr Danya Esquivel  to add pt to 9/20/22  tumor board  Chart Reviewed and tumor board prep completed

## 2022-09-16 NOTE — PROGRESS NOTES
Received message from pt stating he just got out of work and won't make CT scan at [facility] scheduled for 3pm   Stated," you will need to reschedule it "  Returned call to pt and informed him I will cancel appt, but he will need to call Central scheduling to reschedule so that it works with his schedule  Central scheduling number provided

## 2022-09-19 ENCOUNTER — PATIENT OUTREACH (OUTPATIENT)
Dept: HEMATOLOGY ONCOLOGY | Facility: CLINIC | Age: 67
End: 2022-09-19

## 2022-09-19 NOTE — PROGRESS NOTES
Received voicemail from pt requesting all back  Left message for pt returning his all and informed him that I saw he rescheduled TC rea for Wednesday and I will request a stat read so he has the results for his visit with Dr Liliane Pugh  Requested all with further questions/concerns

## 2022-09-21 ENCOUNTER — HOSPITAL ENCOUNTER (OUTPATIENT)
Dept: CT IMAGING | Facility: HOSPITAL | Age: 67
Discharge: HOME | End: 2022-09-21
Attending: UROLOGY
Payer: COMMERCIAL

## 2022-09-21 DIAGNOSIS — C61 PROSTATE CANCER (HCC): ICD-10-CM

## 2022-09-21 PROCEDURE — 70491 CT SOFT TISSUE NECK W/DYE: CPT

## 2022-09-21 PROCEDURE — G1004 CDSM NDSC: HCPCS

## 2022-09-21 PROCEDURE — 71260 CT THORAX DX C+: CPT

## 2022-09-21 PROCEDURE — 74177 CT ABD & PELVIS W/CONTRAST: CPT

## 2022-09-21 RX ADMIN — IOHEXOL 100 ML: 350 INJECTION, SOLUTION INTRAVENOUS at 16:51

## 2022-09-22 ENCOUNTER — CLINICAL SUPPORT (OUTPATIENT)
Dept: RADIATION ONCOLOGY | Facility: HOSPITAL | Age: 67
End: 2022-09-22
Attending: RADIOLOGY
Payer: COMMERCIAL

## 2022-09-22 VITALS
TEMPERATURE: 97.5 F | WEIGHT: 194 LBS | RESPIRATION RATE: 16 BRPM | HEART RATE: 67 BPM | HEIGHT: 71 IN | SYSTOLIC BLOOD PRESSURE: 120 MMHG | BODY MASS INDEX: 27.16 KG/M2 | DIASTOLIC BLOOD PRESSURE: 78 MMHG | OXYGEN SATURATION: 99 %

## 2022-09-22 DIAGNOSIS — C61 PROSTATE CANCER (HCC): Primary | ICD-10-CM

## 2022-09-22 DIAGNOSIS — C61 MALIGNANT NEOPLASM OF PROSTATE (HCC): ICD-10-CM

## 2022-09-22 PROCEDURE — 99205 OFFICE O/P NEW HI 60 MIN: CPT | Performed by: RADIOLOGY

## 2022-09-22 PROCEDURE — 99211 OFF/OP EST MAY X REQ PHY/QHP: CPT | Performed by: RADIOLOGY

## 2022-09-22 PROCEDURE — 77263 THER RADIOLOGY TX PLNG CPLX: CPT | Performed by: RADIOLOGY

## 2022-09-22 PROCEDURE — G0463 HOSPITAL OUTPT CLINIC VISIT: HCPCS | Performed by: RADIOLOGY

## 2022-09-22 RX ORDER — PANTOPRAZOLE SODIUM 40 MG/1
40 TABLET, DELAYED RELEASE ORAL DAILY PRN
COMMUNITY

## 2022-09-22 NOTE — PROGRESS NOTES
Yordy Luna 1955 is a 79 y o  male Patient is a 79 y o male with Mario 4+3=7 prostate cancer, s/p prostatectomy on 1/20/2021  Pathology revealed Clayton 4+3=7 prostate cancer  Small extra prostatic extension noted, no evidence of seminal vesicle invasion or bladder neck involvement, margins and lymph nodes were negative  Postoperatively his PSA was detectable at 0 2  He was seen for radiation consult on 1/6/2022 to discuss salvage radiation therapy to the prostate bed  At that time, his PSA had risen to 0 4  He declined radiation therapy and decided to continue monitoring his PSA  He has been following with Dr Charo Franco  He returns today to re-discuss radiation therapy options due to continued rise in his PSA's  He has been referred by Dr Charo Franco  Component PSA, Total   Latest Ref Rng & Units 0 0 - 4 0 ng/mL   10/13/2020 7 9 (H)   3/8/2021 0 2   5/13/2021 0 3   8/19/2021 0 4   12/8/2021 0 4   3/23/2022 0 6   6/16/2022 0 6   8/18/2022 0 9       6/29/22 NM PET CT skull base to mid thigh  IMPRESSION:   1  Two small PSMA avid lymph nodes in the left hemipelvis suspicious for metastasis  2   Tiny focus of radiotracer uptake in the right parietal occipital calvarium  No definite CT correlate  While incidental benign bone lesions such as hemangiomas or fibrous dysplasia can demonstrate mild uptake, early metastasis is not excluded  This can be reassessed on follow-up PSMA PET  3   Incidentally noted mild radiotracer uptake in a small mass just posterior to the upper trachea in the lower neck  This is unlikely to represent prostate cancer metastasis given the location but an incidental neoplasm should be considered  This could likely be assessed with upper endoscopy versus further imaged with dedicated contrast-enhanced MRI or CT of the neck        7/21/22 Urology, Dr Charo Franco  PSA recurrence postoperatively (0 6), PSMA CT-PET scan concerning for left iliac adenopathy as well as a neck/mediastinal mass  Strongly advocating for wide field external beam radiation therapy to treat the iliac lymph nodes  He is extremely hesitant and skeptical about radiation  Refer back to Rad Onc  He wishes to entertain oral androgen deprivation therapy,referral to Medical Oncology  CT of the neck, C/A/P ordered in regards to the neck/mediastinal mass      22 Hem Onc consult, Dr Emily Hernández: start Lupron by Urology  Start on abiraterone 250 mg 4 tablets every day with prednisone 5 mg p o  b i d  PSA every 2 month initially, CBC, CMP       22 CT soft tissue neck - pending    22 CT chest, abdomen, pelvis - pending      Patient states he's decided not to start abiraterone and prednisone after doing some research  He reports that now he is more in favor of doing radiation therapy  Patient reports that his insurance carrier has denied genetic testing  Upcomin[facility] Urology, Dr Enriquez Kidney  10/21/22 Hem HAIR Goyal-C      Oncology History   Prostate cancer [facility])   2020 Initial Diagnosis    Prostate cancer ([facility] [address] )     2020 Biopsy    Final Diagnosis   A  Prostate, RPZ, core needle biopsies:       - Focal high-grade pin        - No prostatic adenocarcinoma is identified  B  Prostate, RCZ, core needle biopsies:       - Focal high-grade pin        - No prostatic adenocarcinoma is identified  C  Prostate, BROOKS, core needle biopsies:       - Prostatic adenocarcinoma, Mario score 4+3=7, Prognostic Grade Group III, discontinuously involving 2 of 3 cores, (90%, 70%)       - 60% Stacy pattern 4        - No perineural invasion, lymphovascular invasion or extraprostatic extension is identified  D  Prostate, LCZ, core needle biopsies:       - Prostatic adenocarcinoma, Mario score 4+3=7, Prognostic Grade Group III, involving 2 of 3 core biopsies, (80%, <5%)         - 80% Mario pattern 4        - Intraductal carcinoma is identified        - No perineural invasion, lymphovascular invasion or extraprostatic extension is identified  1/20/2021 Surgery    Prostatectomy    Final Diagnosis   A  Urinary Bladder, Anterior bladder neck, Excision:  - Cystitis cystica and benign prostatic glands  B  Urinary Bladder, Posterior bladder neck, Excision:  - Cystitis cystica and benign prostatic glands  C  Urinary Bladder, Posterior bladder neck, Excision:  - Cystitis cystica  D  Urinary Bladder, Posterior bladder neck, Excision:  - Cystitis cystica  E  Urinary Bladder, Anterior bladder neck, Excision:  - Cystitis cystica  F  Urinary Bladder, Anterior bladder neck, Excision:  - Cystitis cystica  G  Lymph Node, Left pelvic lymph node, Excision:  - One reactive lymph node (0/1), negative for carcinoma, supported by CK-AE1/3 immunostain  H  Lymph Node, Right pelvic lymph node, Excision:  - Benign fibroadipose tissue  I  Urinary Bladder, Posterior bladder neck part 3, Excision:  - Cystitis cystica  J  Urinary Bladder, Posterior bladder neck part 3, Excision[de-identified]  - Cystitis cystica  K  Urinary Bladder, Anterior bladder neck, Excision:  - Cystitis cystica  L  Prostate, Prostatectomy:  - Prostatic adenocarcinoma, Mario score 4+3=7, Prognostic Grade Group 3   * Tumor quantitation: 20%   * 60% Vici pattern 4   * Intraductal carcinoma: Present   * Extraprostatic Extension: Present, focally at left posterior   * Resection margins: Negative for carcinoma   * Perineural invasion: Present   * Lymphovascular Invasion: Not Identified, supported by D2-40 and CD31 immunostain   * Pathologic stage (AJCC, 8th ed ): pT3a, pN0, see synoptic report  M  Urinary Bladder, Anterior bladder neck, Excision:  - Benign fibroadipose tissue  Review of Systems:  Review of Systems   Constitutional: Positive for fatigue (naps daily in the afternoon)  Negative for appetite change, fever and unexpected weight change  HENT: Negative  Eyes: Negative      Respiratory: Negative  Negative for cough and shortness of breath  Cardiovascular: Negative  Gastrointestinal: Negative  Negative for abdominal pain, blood in stool, diarrhea and nausea  Endocrine: Negative  Genitourinary: Negative  Negative for dysuria, hematuria and urgency  Nocturia x0-1  Occasional leaking with urgency  Musculoskeletal: Negative  Negative for gait problem  Skin: Negative  Allergic/Immunologic: Positive for environmental allergies (dust)  Neurological: Negative  Negative for dizziness, weakness, light-headedness and numbness  Hematological: Negative  Does not bruise/bleed easily  Psychiatric/Behavioral: Negative  Clinical Trial: no    IPSS Questionnaire (AUA-7): Over the past month    1)  How often have you had a sensation of not emptying your bladder completely after you finish urinating? 0 - Not at all   2)  How often have you had to urinate again less than two hours after you finished urinating? 0 - Not at all   3)  How often have you found you stopped and started again several times when you urinated? 0 - Not at all   4) How difficult have you found it to postpone urination? 0 - Not at all   5) How often have you had a weak urinary stream?  0 - Not at all   6) How often have you had to push or strain to begin urination? 0 - Not at all   7) How many times did you most typically get up to urinate from the time you went to bed until the time you got up in the morning?   1 - 1 time   Total Score:  1       Pain assessment: 0    PFT: n/a    Prior Radiation: No    Teaching:     MST: completed    Implantable Devices (Port, pacemaker, pain stimulator): No    Hip Replacement: No    Covid Vaccine Status: No    Health Maintenance   Topic Date Due    Hepatitis C Screening  Never done    Medicare Annual Wellness Visit (AWV)  Never done    COVID-19 Vaccine (1) Never done    BMI: Followup Plan  Never done    Colorectal Cancer Screening  Never done    Fall Risk  Never done    Pneumococcal Vaccine: 65+ Years (1 - PCV) Never done    Influenza Vaccine (1) 2022    Depression Screening  2023    BMI: Adult  2023    HIB Vaccine  Aged Out    Hepatitis B Vaccine  Aged Out    IPV Vaccine  Aged Out    Hepatitis A Vaccine  Aged Out    Meningococcal ACWY Vaccine  Aged Out    HPV Vaccine  Aged Out       Past Medical History:   Diagnosis Date    Anal fissure     Basal cell carcinoma (BCC)     Basal cell carcinoma (BCC) of face     Cancer ([address] )     Colon polyp     Elevated PSA     GERD (gastroesophageal reflux disease)     Prostate cancer [facility])        Past Surgical History:   Procedure Laterality Date    [facility]  [facility]     EGD      HERNIA REPAIR      inguinal    FL BIOPSY OF PROSTATE,NEEDLE/PUNCH N/A 2020    Procedure: TRANSRECTAL NEEDLE BIOPSY PROSTATE (TRNBP); Surgeon: Leydi Segovia MD;  Location: [facility];  Service: Urology    FL EXC SKIN BENIG 0 6-1 CM FACE,FACIAL Right 2021    Procedure: FACIAL LESION EXCISION WITH FROZEN SECTION AND MULTI LAYER CLOSURE ;  Surgeon: Cheo Holley MD;  Location: [facility];  Service: General    FL LAP,PROSTATECTOMY,RADICAL,W/NERVE SPARE,INCL ROBOTIC N/A 2021    Procedure: Son Dimas  W ROBOTICS;  Surgeon: Leydi Segovia MD;  [facility];  Service: Urology    SPERMATOCELECTOMY  2018    right spermatocelectomy and multiple lipoma removal    US GUIDED PROSTATE BIOPSY  2020       Family History   Problem Relation Age of Onset    Uterine cancer Sister        Social History     Tobacco Use    Smoking status: Former Smoker     Packs/day: 1 00     Years: 3 00     Pack years: 3 00     Quit date:      Years since quittin 7    Smokeless tobacco: Never Used   Vaping Use    Vaping Use: Never used   Substance Use Topics    Alcohol use:  Yes     Alcohol/week: 10 0 standard drinks     Types: 10 Cans of beer per week     Comment: Weekends    Drug use: Yes     Frequency: 2 0 times per week     Types: Marijuana     Comment: Weekends          Current Outpatient Medications:     abiraterone (ZYTIGA) 250 mg tablet, Take 4 tablets (1,000 mg total) by mouth daily, Disp: 120 tablet, Rfl: 3    predniSONE 5 mg tablet, Take 1 tablet (5 mg total) by mouth 2 (two) times a day with meals, Disp: 60 tablet, Rfl: 11  No current facility-administered medications for this visit  Allergies   Allergen Reactions    Other      DUST    Tylenol [Acetaminophen]      RINGING IN EARS        There were no vitals filed for this visit

## 2022-09-22 NOTE — PROGRESS NOTES
Consultation - Radiation Oncology      FDJ:[id number] : 1955  Encounter: [id number]  Patient Information: [address]  Chief Complaint   Patient presents with    Consult     Cancer Staging  No matching staging information was found for the patient  History of Present Illness   Diane Donaldson 1955 is a 79 y o  male Patient is a 79 y o male with Mario 4+3=7 prostate cancer, s/p prostatectomy on 2021  Pathology revealed [place] 4+3=7 prostate cancer  Small extra prostatic extension noted, no evidence of seminal vesicle invasion or bladder neck involvement, margins and lymph nodes were negative  Postoperatively his PSA was detectable at 0 2       He was seen for radiation consult on 2022 to discuss salvage radiation therapy to the prostate bed  At that time, his PSA had risen to 0 4  He declined radiation therapy and decided to continue monitoring his PSA  He has been following with Dr Isabell Sage       He returns today to re-discuss radiation therapy options due to continued rise in his PSA's  He has been referred by Dr Isabell Sage       Component PSA, Total   Latest Ref Rng & Units 0 0 - 4 0 ng/mL   10/13/2020 7 9 (H)   3/8/2021 0 2   2021 0 3   2021 0 4   2021 0 4   3/23/2022 0 6   2022 0 6   2022 0 9         22 NM PET CT skull base to mid thigh  IMPRESSION:   1   Two small PSMA avid lymph nodes in the left hemipelvis suspicious for metastasis  2  Ha [place] focus of radiotracer uptake in the right parietal occipital calvarium   No definite CT correlate  While incidental benign bone lesions such as hemangiomas or fibrous dysplasia can demonstrate mild uptake, early metastasis is not excluded  This can be reassessed on follow-up PSMA PET    3   Incidentally noted mild radiotracer uptake in a small mass just posterior to the upper trachea in the lower neck   This is unlikely to represent prostate cancer metastasis given the location but an incidental neoplasm should be considered   This could likely be assessed with upper endoscopy versus further imaged with dedicated contrast-enhanced MRI or CT of the neck         22 Urology, Dr Gaviota Dowell  PSA recurrence postoperatively (0 6), PSMA CT-PET scan concerning for left iliac adenopathy as well as a neck/mediastinal mass  Strongly advocating for wide field external beam radiation therapy to treat the iliac lymph nodes   He is extremely hesitant and skeptical about radiation  Refer back to Rad Onc  He wishes to entertain oral androgen deprivation therapy,referral to Medical Oncology  CT of the neck, C/A/P ordered in regards to the neck/mediastinal mass        22 Hem Onc consult, Dr Brooke Madden: start Lupron by Urology  Start on abiraterone 250 mg 4 tablets every day with prednisone 5 mg p o  b i d  PSA every 2 month initially, CBC, CMP         22 CT soft tissue neck - pending     22 CT chest, abdomen, pelvis - pending        Patient states he's decided not to start abiraterone and prednisone after doing some research  He reports that now he is more in favor of doing radiation therapy  Patient reports that his insurance carrier has denied genetic testing          Upcomin22 Urology, Dr Gaviota Dowell  10/21/22 Hem Mac Encarnacion PA-C        Historical Information   Oncology History   Prostate cancer [facility])   2020 Initial Diagnosis    Prostate cancer ([facility] [address] )     2020 Biopsy    Final Diagnosis   A  Prostate, RPZ, core needle biopsies:       - Focal high-grade pin        - No prostatic adenocarcinoma is identified  B  Prostate, RCZ, core needle biopsies:       - Focal high-grade pin        - No prostatic adenocarcinoma is identified       C  Prostate, BROOKS, core needle biopsies:       - Prostatic adenocarcinoma, [place] score 4+3=7, Prognostic Grade Group III, discontinuously involving 2 of 3 cores, (90%, 70%)       - 60% Mario pattern 4        - No perineural invasion, lymphovascular invasion or extraprostatic extension is identified  D  Prostate, LCZ, core needle biopsies:       - Prostatic adenocarcinoma, Mario score 4+3=7, Prognostic Grade Group III, involving 2 of 3 core biopsies, (80%, <5%)  - 80% Mario pattern 4        - Intraductal carcinoma is identified        - No perineural invasion, lymphovascular invasion or extraprostatic extension is identified  1/20/2021 Surgery    Prostatectomy    Final Diagnosis   A  Urinary Bladder, Anterior bladder neck, Excision:  - Cystitis cystica and benign prostatic glands  B  Urinary Bladder, Posterior bladder neck, Excision:  - Cystitis cystica and benign prostatic glands  C  Urinary Bladder, Posterior bladder neck, Excision:  - Cystitis cystica  D  Urinary Bladder, Posterior bladder neck, Excision:  - Cystitis cystica  E  Urinary Bladder, Anterior bladder neck, Excision:  - Cystitis cystica  F  Urinary Bladder, Anterior bladder neck, Excision:  - Cystitis cystica  G  Lymph Node, Left pelvic lymph node, Excision:  - One reactive lymph node (0/1), negative for carcinoma, supported by CK-AE1/3 immunostain  H  Lymph Node, Right pelvic lymph node, Excision:  - Benign fibroadipose tissue  I  Urinary Bladder, Posterior bladder neck part 3, Excision:  - Cystitis cystica  J  Urinary Bladder, Posterior bladder neck part 3, Excision[de-identified]  - Cystitis cystica  K  Urinary Bladder, Anterior bladder neck, Excision:  - Cystitis cystica       L  Prostate, Prostatectomy:  - Prostatic adenocarcinoma, Mario score 4+3=7, Prognostic Grade Group 3   * Tumor quantitation: 20%   * 60% Mario pattern 4   * Intraductal carcinoma: Present   * Extraprostatic Extension: Present, focally at left posterior   * Resection margins: Negative for carcinoma   * Perineural invasion: Present   * Lymphovascular Invasion: Not Identified, supported by D2-40 and CD31 immunostain   * Pathologic stage (AJCC, 8th ed ): pT3a, pN0, see synoptic report  M  Urinary Bladder, Anterior bladder neck, Excision:  - Benign fibroadipose tissue  Past Medical History:   Diagnosis Date    Anal fissure     Basal cell carcinoma (BCC)     Basal cell carcinoma (BCC) of face     Cancer (HCC)     Colon polyp     Elevated PSA     GERD (gastroesophageal reflux disease)     Prostate cancer [facility])      Past Surgical History:   Procedure Laterality Date    [facility]COPY  2020    [facility]     EGD      HERNIA REPAIR      inguinal    FL BIOPSY OF PROSTATE,NEEDLE/PUNCH N/A 2020    Procedure: TRANSRECTAL NEEDLE BIOPSY PROSTATE (TRNBP);   Surgeon: Yesica Marx MD;  [facility];  Service: Urology    FL EXC SKIN BENIG 0 6-1 CM FACE,FACIAL Right 2021    Procedure: FACIAL LESION EXCISION WITH FROZEN SECTION AND MULTI LAYER CLOSURE ;  Surgeon: Yaron Rojas MD;  Location:  MAIN OR;  Service: General    FL LAP,PROSTATECTOMY,RADICAL,W/NERVE SPARE,INCL ROBOTIC N/A 2021    Procedure: Annalise Horan  W ROBOTICS;  Surgeon: Yesica Marx MD;  [facility];  Service: Urology    SPERMATOCELECTOMY  2018    right spermatocelectomy and multiple lipoma removal    US GUIDED PROSTATE BIOPSY  2020       Family History   Problem Relation Age of Onset    Uterine cancer Sister        Social History   Social History     Substance and Sexual Activity   Alcohol Use Yes    Alcohol/week: 10 0 standard drinks    Types: 10 Cans of beer per week    Comment: Weekends     Social History     Substance and Sexual Activity   Drug Use Yes    Frequency: 2 0 times per week    Types: Marijuana    Comment: Weekends     Social History     Tobacco Use   Smoking Status Former Smoker    Packs/day: 1 00    Years: 3 00    Pack years: 3 00    Quit date: 80    Years since quittin 7   Smokeless Tobacco Never Used         Meds/Allergies     Current Outpatient Medications:    Bioflavonoid Products (STEPHANY-C PO), Take by mouth daily as needed, Disp: , Rfl:     pantoprazole (Protonix) 40 mg tablet, Take 40 mg by mouth daily as needed, Disp: , Rfl:     abiraterone (ZYTIGA) 250 mg tablet, Take 4 tablets (1,000 mg total) by mouth daily (Patient not taking: Reported on 9/22/2022), Disp: 120 tablet, Rfl: 3    predniSONE 5 mg tablet, Take 1 tablet (5 mg total) by mouth 2 (two) times a day with meals (Patient not taking: Reported on 9/22/2022), Disp: 60 tablet, Rfl: 11  No current facility-administered medications for this visit  Allergies   Allergen Reactions    Other      DUST    Tylenol [Acetaminophen]      RINGING IN EARS         Review of Systems   Constitutional: Positive for fatigue (naps daily in the afternoon)  Negative for appetite change, fever and unexpected weight change  HENT: Negative  Eyes: Negative  Respiratory: Negative  Negative for cough and shortness of breath  Cardiovascular: Negative  Gastrointestinal: Negative  Negative for abdominal pain, blood in stool, diarrhea and nausea  Endocrine: Negative  Genitourinary: Negative  Negative for dysuria, hematuria and urgency  Nocturia x0-1  Occasional leaking with urgency  Musculoskeletal: Negative  Negative for gait problem  Skin: Negative  Allergic/Immunologic: Positive for environmental allergies (dust)  Neurological: Negative  Negative for dizziness, weakness, light-headedness and numbness  Hematological: Negative  Does not bruise/bleed easily  Psychiatric/Behavioral: Negative           Clinical Trial: no     IPSS Questionnaire (AUA-7): Over the past month     1)  How often have you had a sensation of not emptying your bladder completely after you finish urinating? 0 - Not at all   2)  How often have you had to urinate again less than two hours after you finished urinating?  0 - Not at all   3)  How often have you found you stopped and started again several times when you urinated? 0 - Not at all   4) How difficult have you found it to postpone urination? 0 - Not at all   5) How often have you had a weak urinary stream?  0 - Not at all   6) How often have you had to push or strain to begin urination? 0 - Not at all   7) How many times did you most typically get up to urinate from the time you went to bed until the time you got up in the morning? 1 - 1 time   Total Score:  1            OBJECTIVE:   /78 (BP Location: Left arm, Patient Position: Sitting)   Pulse 67   Temp 97 5 °F (36 4 °C) (Temporal)   Resp 16   Ht 5' 11" (1 803 m)   Wt 88 kg (194 lb)   SpO2 99%   BMI 27 06 kg/m²   Pain Assessment:  0  Performance Status: ECOG/Zubrod/WHO: 0 - Asymptomatic    Physical Exam   He is conversing appropriately  Breathing is unlabored  Ambulating independently  RESULTS  Lab Results    Chemistry        Component Value Date/Time    K 4 4 08/18/2022 1452     08/18/2022 1452    CO2 26 08/18/2022 1452    BUN 18 08/18/2022 1452    CREATININE 1 24 08/18/2022 1452        Component Value Date/Time    CALCIUM 10 0 08/18/2022 1452    ALKPHOS 72 08/18/2022 1452    AST 23 08/18/2022 1452    ALT 21 08/18/2022 1452            Lab Results   Component Value Date    WBC 9 41 08/18/2022    HGB 15 6 08/18/2022    HCT 47 1 08/18/2022    MCV 90 08/18/2022     08/18/2022         Imaging Studies  No results found  ASSESSMENT  1  Prostate cancer ([facility] [address] )     2  Malignant neoplasm of prostate [facility])  Ambulatory Referral to Radiation Oncology     Cancer Staging  No matching staging information was found for the patient  PLAN/DISCUSSION  No orders of the defined types were placed in this encounter  Bryant Wilson is a 79y o  year old male with T3a N0 Mario 7 (4+3) prostate carcinoma status post prostatectomy on 1/20/2021  He did have extraprostatic extension noted with negative margins  Postoperatively his PSA has remained detectable    He was seen in the past and offered salvage radiation therapy which he declined  He now has had continued rise in his PSA most recently 0 9 last month  In June he underwent PSMA PET scan which revealed left pelvic adenopathy consistent with metastasis  There was a small focus in the right occipital calvarium recommended for follow-up  Of note there is also mild radio tracer uptake in the lesion posterior to the upper trachea  He has undergone follow-up CT scan of the neck and chest abdomen and pelvis yesterday however these results are still pending  We reviewed once again salvage radiation therapy to include the pelvis and prostate bed should the remainder of his imaging workup returned negative  Should there be continued mass noted posterior to his trachea this may need evaluation prior to proceeding with treatment  In the interim we discussed at the least he should initiate Lupron therapy as this will be is mainstay of treatment  We reviewed that should there be no other areas of metastasis outside of his pelvis radiation treatment to the pelvic lymph nodes and prostate bed would be recommended  We reviewed CT simulation procedure  Side effects both acute and long-term were also reviewed with him in detail  This can include but not limited to fatigue, frequent and urgent urination, dysuria, enteritis, sexual dysfunction  He did sign consent for proceeding with treatment however does not want to proceed until his follow-up with Dr Karina Pfeiffer and discussion of Lupron  Of note he also was seen by medical oncology who has recommended Abiraterone which he is declining  He also would like his assessment of his CT reports when available  Our office has requested from the radiology department to have these red prior to his appointment with Dr Karina Pfeiffer tomorrow    He will contact us with his decision after this appointment          Yanci Urrutia MD  9/22/2022,10:27 AM      Portions of the record may have been created with voice recognition software  Occasional wrong word or "sound a like" substitutions may have occurred due to the inherent limitations of voice recognition software  Read the chart carefully and recognize, using context, where substitutions have occurred

## 2022-09-23 ENCOUNTER — OFFICE VISIT (OUTPATIENT)
Dept: UROLOGY | Facility: MEDICAL CENTER | Age: 67
End: 2022-09-23
Payer: COMMERCIAL

## 2022-09-23 ENCOUNTER — PATIENT OUTREACH (OUTPATIENT)
Dept: HEMATOLOGY ONCOLOGY | Facility: CLINIC | Age: 67
End: 2022-09-23

## 2022-09-23 ENCOUNTER — TELEPHONE (OUTPATIENT)
Dept: UROLOGY | Facility: MEDICAL CENTER | Age: 67
End: 2022-09-23

## 2022-09-23 VITALS
HEART RATE: 68 BPM | HEIGHT: 71 IN | BODY MASS INDEX: 27.3 KG/M2 | SYSTOLIC BLOOD PRESSURE: 138 MMHG | WEIGHT: 195 LBS | DIASTOLIC BLOOD PRESSURE: 82 MMHG

## 2022-09-23 DIAGNOSIS — C61 PROSTATE CANCER (HCC): Primary | ICD-10-CM

## 2022-09-23 PROCEDURE — 99215 OFFICE O/P EST HI 40 MIN: CPT | Performed by: UROLOGY

## 2022-09-23 RX ORDER — OMEPRAZOLE 40 MG/1
40 CAPSULE, DELAYED RELEASE ORAL DAILY
COMMUNITY
Start: 2022-07-26

## 2022-09-23 NOTE — LETTER
September 23, 2022     Denisha Reynolds MD  [address] [facility] [address]    Patient: Brennon Bliss   YOB: 1955   Date of Visit: 9/23/2022       Dear Dr Vargas Salts: Thank you for referring Brennon Bliss to me for evaluation  Below are my notes for this consultation  If you have questions, please do not hesitate to call me  I look forward to following your patient along with you  Sincerely,        Germán Camejo MD        CC: MD Samson Davidson MD Clydia Jenny, RN  [place], [place]  Germán Camejo MD  9/23/2022  5:11 PM  Sign when Signing Visit  9/23/2022    Brennon Bliss  1955  [id number]        Assessment  [place] 7 prostate cancer status post robot assisted laparoscopic prostatectomy, negative surgical margins, rising PSA post prostatectomy, PSMA CT-PET scan concerning for pelvic lymphadenopathy positive for prostate cancer      Discussion  I had a lengthy discussion with Meenakshi Esqueda in the office today  First we reviewed the CT scan of the neck, chest, abdomen and pelvis  There is no identifiable pathology  We then readdress the PSMA CT-PET scan which shows activity in the pelvic sidewall concerning for lymph node metastasis  We discussed that his case was reviewed at Urology tumor Board and NCCN guidelines were reviewed  Androgen deprivation therapy with Lupron and abiraterone is recommended along with external beam radiation therapy  After lengthy discussion he is amenable to Lupron 6 month depot x1 and external beam radiation therapy  He does not wish to take the abiraterone  I explained that there is a survival benefit but stone upon patients who take Lupron along with oral ADT and receive radiation post prostatectomy  History of Present Illness  79 y o  male with a history of [place] 3+4=7/10 prostate cancer  He underwent robot assisted laparoscopic prostatectomy performed in January of 2021  His postoperative PSA jesus was 0 2    His PSA has been slowly rising since that time and is now as high as 0 9  A recent CT PSMA scan shows activity in the pelvic sidewall highly concerning for disease within the lymph nodes  He recently had a CT scan of the neck, chest, abdomen, and pelvis and fortunately the showed no evidence of disease  There is no evidence of osseous disease  He has been evaluated by Medical Oncology  He has been recommended to be on Lupron as well as abiraterone  He has been seen by radiation oncology and wide field pelvic radiation has been discussed and reviewed  All the findings were agreed upon at Urology tumor Board  He returns for follow-up today for additional discussion  AUA Symptom Score      Review of Systems  Review of Systems   Constitutional: Negative  HENT: Negative  Eyes: Negative  Respiratory: Negative  Cardiovascular: Negative  Gastrointestinal: Negative  Endocrine: Negative  Genitourinary:        Per HPI   Musculoskeletal: Negative  Skin: Negative  Allergic/Immunologic: Negative  Neurological: Negative  Hematological: Negative  Psychiatric/Behavioral: Negative  Past Medical History  Past Medical History:   Diagnosis Date    Anal fissure     Basal cell carcinoma (BCC)     Basal cell carcinoma (BCC) of face     Cancer (HCC)     Colon polyp     Elevated PSA     GERD (gastroesophageal reflux disease)     Prostate cancer [facility])        Past Social History  Past Surgical History:   Procedure Laterality Date    COL[facility]COPY  05/2020    [facility] [place]     EGD      HERNIA REPAIR      inguinal    DE BIOPSY OF PROSTATE,NEEDLE/PUNCH N/A 9/11/2020    Procedure: TRANSRECTAL NEEDLE BIOPSY PROSTATE (TRNBP);   Surgeon: Houston Orr MD;  Location:  MAIN OR;  Service: Urology    DE EXC SKIN BENIG 0 6-1 CM FACE,FACIAL Right 4/6/2021    Procedure: FACIAL LESION EXCISION WITH FROZEN SECTION AND MULTI LAYER CLOSURE ;  Surgeon: Sue Koehler MD;  Location:  MAIN OR; Service: General    IN LAP,PROSTATECTOMY,RADICAL,W/NERVE SPARE,INCL ROBOTIC N/A 2021    Procedure: PROSTATECTOMY RADICAL LAPAROSCOPIC  W ROBOTICS;  Surgeon: Silverio Macdonald MD;  Location: [facility];  Service: Urology    SPERMATOCELECTOMY  2018    right spermatocelectomy and multiple lipoma removal    US GUIDED PROSTATE BIOPSY  2020       Past Family History  Family History   Problem Relation Age of Onset    Uterine cancer Sister        Past Social history  Social History     Socioeconomic History    Marital status: Single     Spouse name: Not on file    Number of children: Not on file    Years of education: Not on file    Highest education level: Not on file   Occupational History    Occupation: self employed   Tobacco Use    Smoking status: Former Smoker     Packs/day: 1 00     Years: 3 00     Pack years: 3 00     Quit date:      Years since quittin 7    Smokeless tobacco: Never Used   Vaping Use    Vaping Use: Never used   Substance and Sexual Activity    Alcohol use: Yes     Alcohol/week: 10 0 standard drinks     Types: 10 Cans of beer per week     Comment: Weekends    Drug use: Yes     Frequency: 2 0 times per week     Types: Marijuana     Comment: Weekends    Sexual activity: Yes     Partners: Female   Other Topics Concern    Not on file   Social History Narrative    Most recent tobacco use screenin2018    Live alone or with others: with others    Marital status: Single    Occupation: self employed    Are you currently employed:  Yes    Alcohol intake: Occasional     Social Determinants of Health     Financial Resource Strain: Not on file   Food Insecurity: Not on file   Transportation Needs: Not on file   Physical Activity: Not on file   Stress: Not on file   Social Connections: Not on file   Intimate Partner Violence: Not on file   Housing Stability: Not on file       Current Medications  Current Outpatient Medications   Medication Sig Dispense Refill    abiraterone (ZYTIGA) 250 mg tablet Take 4 tablets (1,000 mg total) by mouth daily (Patient not taking: Reported on 9/22/2022) 120 tablet 3    Bioflavonoid Products (STEPHANY-C PO) Take by mouth daily as needed      pantoprazole (Protonix) 40 mg tablet Take 40 mg by mouth daily as needed      predniSONE 5 mg tablet Take 1 tablet (5 mg total) by mouth 2 (two) times a day with meals (Patient not taking: Reported on 9/22/2022) 60 tablet 11     No current facility-administered medications for this visit  Allergies  Allergies   Allergen Reactions    Other      DUST    Tylenol [Acetaminophen]      RINGING IN EARS       Past Medical History, Social History, Family History, medications and allergies were reviewed  Vitals  There were no vitals filed for this visit  Physical Exam  Physical Exam  On examination he is in no acute distress    Gait normal   Affect normal    Results  Lab Results   Component Value Date    PSA 0 9 08/18/2022    PSA 0 6 06/16/2022    PSA 0 6 03/23/2022     Lab Results   Component Value Date    CALCIUM 10 0 08/18/2022    K 4 4 08/18/2022    CO2 26 08/18/2022     08/18/2022    BUN 18 08/18/2022    CREATININE 1 24 08/18/2022     Lab Results   Component Value Date    WBC 9 41 08/18/2022    HGB 15 6 08/18/2022    HCT 47 1 08/18/2022    MCV 90 08/18/2022     08/18/2022         Office Urine Dip  No results found for this or any previous visit (from the past 1 hour(s)) ]

## 2022-09-23 NOTE — PROGRESS NOTES
Call to Radiology Reading room requesting read on scans from 9/21/22 for pt's visit with Dr Omid Fry at 3:45pm

## 2022-09-23 NOTE — TELEPHONE ENCOUNTER
As per Dr Torres Gains patient was seen on 9-:    Return for [address] 1-2 weeks with Celso Sarabia in [facility]

## 2022-09-23 NOTE — PROGRESS NOTES
9/23/2022    Miquel Pitch  1955  [id number]        Assessment  [place] 7 prostate cancer status post robot assisted laparoscopic prostatectomy, negative surgical margins, rising PSA post prostatectomy, PSMA CT-PET scan concerning for pelvic lymphadenopathy positive for prostate cancer      Discussion  I had a lengthy discussion with TRISTEN in the office today  First we reviewed the CT scan of the neck, chest, abdomen and pelvis  There is no identifiable pathology  We then readdress the PSMA CT-PET scan which shows activity in the pelvic sidewall concerning for lymph node metastasis  We discussed that his case was reviewed at Urology tumor Board and NCCN guidelines were reviewed  Androgen deprivation therapy with Lupron and abiraterone is recommended along with external beam radiation therapy  After lengthy discussion he is amenable to Lupron 6 month depot x1 and external beam radiation therapy  He does not wish to take the abiraterone  I explained that there is a survival benefit but stone upon patients who take Lupron along with oral ADT and receive radiation post prostatectomy  History of Present Illness  79 y o  male with a history of [place] 3+4=7/10 prostate cancer  He underwent robot assisted laparoscopic prostatectomy performed in January of 2021  His postoperative PSA jesus was 0 2  His PSA has been slowly rising since that time and is now as high as 0 9  A recent CT PSMA scan shows activity in the pelvic sidewall highly concerning for disease within the lymph nodes  He recently had a CT scan of the neck, chest, abdomen, and pelvis and fortunately the showed no evidence of disease  There is no evidence of osseous disease  He has been evaluated by Medical Oncology  He has been recommended to be on Lupron as well as abiraterone  He has been seen by radiation oncology and wide field pelvic radiation has been discussed and reviewed    All the findings were agreed upon at Urology tumor Board  He returns for follow-up today for additional discussion  AUA Symptom Score      Review of Systems  Review of Systems   Constitutional: Negative  HENT: Negative  Eyes: Negative  Respiratory: Negative  Cardiovascular: Negative  Gastrointestinal: Negative  Endocrine: Negative  Genitourinary:        Per HPI   Musculoskeletal: Negative  Skin: Negative  Allergic/Immunologic: Negative  Neurological: Negative  Hematological: Negative  Psychiatric/Behavioral: Negative  Past Medical History  Past Medical History:   Diagnosis Date    Anal fissure     Basal cell carcinoma (BCC)     Basal cell carcinoma (BCC) of face     Cancer (HCC)     Colon polyp     Elevated PSA     GERD (gastroesophageal reflux disease)     Prostate cancer [facility])        Past Social History  Past Surgical History:   Procedure Laterality Date    COLONOSCOPY  05/2020    [facility]     EGD      HERNIA REPAIR      inguinal    WI BIOPSY OF PROSTATE,NEEDLE/PUNCH N/A 9/11/2020    Procedure: TRANSRECTAL NEEDLE BIOPSY PROSTATE (TRNBP);   Surgeon: Moisés Kovacs MD;  Location:  MAIN OR;  Service: Urology    WI EXC SKIN BENIG 0 6-1 CM FACE,FACIAL Right 4/6/2021    Procedure: FACIAL LESION EXCISION WITH FROZEN SECTION AND MULTI LAYER CLOSURE ;  Surgeon: Amanda Eden MD;  Location:  MAIN OR;  Service: General    WI LAP,PROSTATECTOMY,RADICAL,W/NERVE SPARE,INCL ROBOTIC N/A 1/20/2021    Procedure: Cuco Ross;  Surgeon: Moisés Kovacs MD;  Location:  MAIN OR;  Service: Urology    SPERMATOCELECTOMY  02/2018    right spermatocelectomy and multiple lipoma removal    US GUIDED PROSTATE BIOPSY  9/11/2020       Past Family History  Family History   Problem Relation Age of Onset    Uterine cancer Sister        Past Social history  Social History     Socioeconomic History    Marital status: Single     Spouse name: Not on file    Number of children: Not on file    Years of education: Not on file    Highest education level: Not on file   Occupational History    Occupation: self employed   Tobacco Use    Smoking status: Former Smoker     Packs/day: 1 00     Years: 3 00     Pack years: 3 00     Quit date:      Years since quittin 7    Smokeless tobacco: Never Used   Vaping Use    Vaping Use: Never used   Substance and Sexual Activity    Alcohol use: Yes     Alcohol/week: 10 0 standard drinks     Types: 10 Cans of beer per week     Comment: Weekends    Drug use: Yes     Frequency: 2 0 times per week     Types: Marijuana     Comment: Weekends    Sexual activity: Yes     Partners: Female   Other Topics Concern    Not on file   Social History Narrative    Most recent tobacco use screenin2018    Live alone or with others: with others    Marital status: Single    Occupation: self employed    Are you currently employed: Yes    Alcohol intake: Occasional     Social Determinants of Health     Financial Resource Strain: Not on file   Food Insecurity: Not on file   Transportation Needs: Not on file   Physical Activity: Not on file   Stress: Not on file   Social Connections: Not on file   Intimate Partner Violence: Not on file   Housing Stability: Not on file       Current Medications  Current Outpatient Medications   Medication Sig Dispense Refill    abiraterone (ZYTIGA) 250 mg tablet Take 4 tablets (1,000 mg total) by mouth daily (Patient not taking: Reported on 2022) 120 tablet 3    Bioflavonoid Products (STEPHANY-C PO) Take by mouth daily as needed      pantoprazole (Protonix) 40 mg tablet Take 40 mg by mouth daily as needed      predniSONE 5 mg tablet Take 1 tablet (5 mg total) by mouth 2 (two) times a day with meals (Patient not taking: Reported on 2022) 60 tablet 11     No current facility-administered medications for this visit         Allergies  Allergies   Allergen Reactions    Other      DUST    Tylenol [Acetaminophen] RINGING IN EARS       Past Medical History, Social History, Family History, medications and allergies were reviewed  Vitals  There were no vitals filed for this visit  Physical Exam  Physical Exam  On examination he is in no acute distress    Gait normal   Affect normal    Results  Lab Results   Component Value Date    PSA 0 9 08/18/2022    PSA 0 6 06/16/2022    PSA 0 6 03/23/2022     Lab Results   Component Value Date    CALCIUM 10 0 08/18/2022    K 4 4 08/18/2022    CO2 26 08/18/2022     08/18/2022    BUN 18 08/18/2022    CREATININE 1 24 08/18/2022     Lab Results   Component Value Date    WBC 9 41 08/18/2022    HGB 15 6 08/18/2022    HCT 47 1 08/18/2022    MCV 90 08/18/2022     08/18/2022         Office Urine Dip  No results found for this or any previous visit (from the past 1 hour(s)) ]

## 2022-09-26 NOTE — TELEPHONE ENCOUNTER
LM for patient to call us back to schedule Lupron injection as per the note  Holding 9/30 at 1 in [place]  Please confirm with patient when he calls back

## 2022-09-27 NOTE — TELEPHONE ENCOUNTER
Called and LM for patient asking him to confirm 9/30 at 1 in the [place] office for his Lupron injection

## 2022-09-28 ENCOUNTER — PATIENT OUTREACH (OUTPATIENT)
Dept: HEMATOLOGY ONCOLOGY | Facility: CLINIC | Age: 67
End: 2022-09-28

## 2022-09-28 NOTE — PROGRESS NOTES
Received message from pt stating he is scheduled for his lupron on Friday  Returned call to pt  Thanked him for updating me on when his injection would be  Pt had questions regarding rechecking PSA and timing of follow-up  Informed him I would confirm with Dr Steffanie Valderrama but usually follow-up and PSA is about 2-3 months after completion of radiation  Pt states he wants to know if the radiation worked and explained that even when he completes the radiation it still continues working and that is why we don't check it right away  Informed him I would review timing with Dr Sina Gibson and call him back

## 2022-09-30 ENCOUNTER — PROCEDURE VISIT (OUTPATIENT)
Dept: UROLOGY | Facility: AMBULATORY SURGERY CENTER | Age: 67
End: 2022-09-30
Payer: COMMERCIAL

## 2022-09-30 ENCOUNTER — TELEPHONE (OUTPATIENT)
Dept: UROLOGY | Facility: AMBULATORY SURGERY CENTER | Age: 67
End: 2022-09-30

## 2022-09-30 VITALS
SYSTOLIC BLOOD PRESSURE: 124 MMHG | BODY MASS INDEX: 26.99 KG/M2 | WEIGHT: 192.8 LBS | HEIGHT: 71 IN | DIASTOLIC BLOOD PRESSURE: 62 MMHG | HEART RATE: 60 BPM

## 2022-09-30 DIAGNOSIS — C61 PROSTATE CANCER (HCC): Primary | ICD-10-CM

## 2022-09-30 PROCEDURE — 96402 CHEMO HORMON ANTINEOPL SQ/IM: CPT

## 2022-09-30 NOTE — PROGRESS NOTES
9/30/2022  Carline Zuñiga is a 79 y o  male  [id number]    Diagnosis:  Chief Complaint     Lupron Injection          Patient presents for Lupron injection managed by Dr Kaleigh Younger    Plan:  Follow up as scheduled with Dr Kaleigh Younger  Advised to contact the office in the meantime with any questions or concerns  Medication administration:    45 mg of Lupron injected to LUOQ with no issues      Administrations This Visit     leuprolide (LUPRON DEPOT 6 MONTH KIT) IM injection kit 45 mg     Admin Date  09/30/2022 Action  Given Dose  45 mg Route  Intramuscular Administered By  Theron Campos RN              Vitals:    09/30/22 1308   BP: 124/62   BP Location: Left arm   Patient Position: Sitting   Cuff Size: Adult   Pulse: 60   Weight: 87 5 kg (192 lb 12 8 oz)   Height: 5' 11" (1 803 m)           Theron Campos, JESSIN, RN

## 2022-09-30 NOTE — TELEPHONE ENCOUNTER
Lm for Antony Butler that he missed his appointment for [place] appointment - he needs to call back and reschedule

## 2022-10-03 ENCOUNTER — APPOINTMENT (OUTPATIENT)
Dept: RADIATION ONCOLOGY | Facility: HOSPITAL | Age: 67
End: 2022-10-03
Attending: RADIOLOGY

## 2022-10-10 ENCOUNTER — TELEPHONE (OUTPATIENT)
Dept: GENETICS | Facility: CLINIC | Age: 67
End: 2022-10-10

## 2022-10-10 NOTE — TELEPHONE ENCOUNTER
----- Message from Nicole Reeves sent at 10/10/2022  8:23 AM EDT -----  Regarding: discontinue letter  Kennedy Feliciano,    We have not received this patient's sample in a month  Can you please send a cancellation ltter? He does not have a mychart  Thanks!   Kacie

## 2022-10-14 ENCOUNTER — APPOINTMENT (OUTPATIENT)
Dept: RADIATION ONCOLOGY | Facility: HOSPITAL | Age: 67
End: 2022-10-14
Attending: RADIOLOGY

## 2022-10-17 ENCOUNTER — APPOINTMENT (OUTPATIENT)
Dept: RADIATION ONCOLOGY | Facility: HOSPITAL | Age: 67
End: 2022-10-17
Attending: RADIOLOGY

## 2022-10-18 ENCOUNTER — APPOINTMENT (OUTPATIENT)
Dept: RADIATION ONCOLOGY | Facility: HOSPITAL | Age: 67
End: 2022-10-18
Attending: RADIOLOGY

## 2022-10-19 ENCOUNTER — APPOINTMENT (OUTPATIENT)
Dept: RADIATION ONCOLOGY | Facility: HOSPITAL | Age: 67
End: 2022-10-19
Attending: RADIOLOGY

## 2022-10-20 ENCOUNTER — APPOINTMENT (OUTPATIENT)
Dept: RADIATION ONCOLOGY | Facility: HOSPITAL | Age: 67
End: 2022-10-20
Attending: RADIOLOGY

## 2022-10-20 DIAGNOSIS — C61 PROSTATE CANCER (HCC): Primary | ICD-10-CM

## 2022-10-21 ENCOUNTER — APPOINTMENT (OUTPATIENT)
Dept: RADIATION ONCOLOGY | Facility: HOSPITAL | Age: 67
End: 2022-10-21
Attending: RADIOLOGY

## 2022-10-24 ENCOUNTER — APPOINTMENT (OUTPATIENT)
Dept: RADIATION ONCOLOGY | Facility: HOSPITAL | Age: 67
End: 2022-10-24
Attending: RADIOLOGY

## 2022-10-25 ENCOUNTER — APPOINTMENT (OUTPATIENT)
Dept: RADIATION ONCOLOGY | Facility: HOSPITAL | Age: 67
End: 2022-10-25
Attending: RADIOLOGY

## 2022-10-26 ENCOUNTER — APPOINTMENT (OUTPATIENT)
Dept: RADIATION ONCOLOGY | Facility: HOSPITAL | Age: 67
End: 2022-10-26
Attending: RADIOLOGY

## 2022-10-26 ENCOUNTER — APPOINTMENT (OUTPATIENT)
Dept: LAB | Facility: HOSPITAL | Age: 67
End: 2022-10-26

## 2022-10-26 LAB
BASOPHILS # BLD AUTO: 0.04 THOUSANDS/ÂΜL (ref 0–0.1)
BASOPHILS NFR BLD AUTO: 1 % (ref 0–1)
EOSINOPHIL # BLD AUTO: 0.15 THOUSAND/ÂΜL (ref 0–0.61)
EOSINOPHIL NFR BLD AUTO: 2 % (ref 0–6)
ERYTHROCYTE [DISTWIDTH] IN BLOOD BY AUTOMATED COUNT: 12.7 % (ref 11.6–15.1)
HCT VFR BLD AUTO: 43.3 % (ref 36.5–49.3)
HGB BLD-MCNC: 14.9 G/DL (ref 12–17)
IMM GRANULOCYTES # BLD AUTO: 0.03 THOUSAND/UL (ref 0–0.2)
IMM GRANULOCYTES NFR BLD AUTO: 0 % (ref 0–2)
LYMPHOCYTES # BLD AUTO: 1.05 THOUSANDS/ÂΜL (ref 0.6–4.47)
LYMPHOCYTES NFR BLD AUTO: 16 % (ref 14–44)
MCH RBC QN AUTO: 30.7 PG (ref 26.8–34.3)
MCHC RBC AUTO-ENTMCNC: 34.4 G/DL (ref 31.4–37.4)
MCV RBC AUTO: 89 FL (ref 82–98)
MONOCYTES # BLD AUTO: 0.6 THOUSAND/ÂΜL (ref 0.17–1.22)
MONOCYTES NFR BLD AUTO: 9 % (ref 4–12)
NEUTROPHILS # BLD AUTO: 4.82 THOUSANDS/ÂΜL (ref 1.85–7.62)
NEUTS SEG NFR BLD AUTO: 72 % (ref 43–75)
NRBC BLD AUTO-RTO: 0 /100 WBCS
PLATELET # BLD AUTO: 207 THOUSANDS/UL (ref 149–390)
PMV BLD AUTO: 10.7 FL (ref 8.9–12.7)
RBC # BLD AUTO: 4.86 MILLION/UL (ref 3.88–5.62)
WBC # BLD AUTO: 6.69 THOUSAND/UL (ref 4.31–10.16)

## 2022-10-26 PROCEDURE — 36415 COLL VENOUS BLD VENIPUNCTURE: CPT

## 2022-10-26 PROCEDURE — 85025 COMPLETE CBC W/AUTO DIFF WBC: CPT

## 2022-10-27 ENCOUNTER — APPOINTMENT (OUTPATIENT)
Dept: RADIATION ONCOLOGY | Facility: HOSPITAL | Age: 67
End: 2022-10-27
Attending: RADIOLOGY

## 2022-10-28 ENCOUNTER — APPOINTMENT (OUTPATIENT)
Dept: RADIATION ONCOLOGY | Facility: HOSPITAL | Age: 67
End: 2022-10-28
Attending: RADIOLOGY

## 2022-10-31 ENCOUNTER — APPOINTMENT (OUTPATIENT)
Dept: RADIATION ONCOLOGY | Facility: HOSPITAL | Age: 67
End: 2022-10-31
Attending: RADIOLOGY

## 2022-11-01 ENCOUNTER — APPOINTMENT (OUTPATIENT)
Dept: RADIATION ONCOLOGY | Facility: HOSPITAL | Age: 67
End: 2022-11-01
Attending: RADIOLOGY

## 2022-11-02 ENCOUNTER — APPOINTMENT (OUTPATIENT)
Dept: RADIATION ONCOLOGY | Facility: HOSPITAL | Age: 67
End: 2022-11-02
Attending: RADIOLOGY

## 2022-11-03 ENCOUNTER — APPOINTMENT (OUTPATIENT)
Dept: RADIATION ONCOLOGY | Facility: HOSPITAL | Age: 67
End: 2022-11-03
Attending: RADIOLOGY

## 2022-11-04 ENCOUNTER — APPOINTMENT (OUTPATIENT)
Dept: RADIATION ONCOLOGY | Facility: HOSPITAL | Age: 67
End: 2022-11-04
Attending: RADIOLOGY

## 2022-11-07 ENCOUNTER — APPOINTMENT (OUTPATIENT)
Dept: RADIATION ONCOLOGY | Facility: HOSPITAL | Age: 67
End: 2022-11-07
Attending: RADIOLOGY

## 2022-11-08 ENCOUNTER — APPOINTMENT (OUTPATIENT)
Dept: RADIATION ONCOLOGY | Facility: HOSPITAL | Age: 67
End: 2022-11-08
Attending: RADIOLOGY

## 2022-11-09 ENCOUNTER — APPOINTMENT (OUTPATIENT)
Dept: RADIATION ONCOLOGY | Facility: HOSPITAL | Age: 67
End: 2022-11-09
Attending: RADIOLOGY

## 2022-11-09 ENCOUNTER — APPOINTMENT (OUTPATIENT)
Dept: LAB | Facility: HOSPITAL | Age: 67
End: 2022-11-09

## 2022-11-10 ENCOUNTER — APPOINTMENT (OUTPATIENT)
Dept: RADIATION ONCOLOGY | Facility: HOSPITAL | Age: 67
End: 2022-11-10
Attending: RADIOLOGY

## 2022-11-11 ENCOUNTER — APPOINTMENT (OUTPATIENT)
Dept: RADIATION ONCOLOGY | Facility: HOSPITAL | Age: 67
End: 2022-11-11
Attending: RADIOLOGY

## 2022-11-14 ENCOUNTER — APPOINTMENT (OUTPATIENT)
Dept: RADIATION ONCOLOGY | Facility: HOSPITAL | Age: 67
End: 2022-11-14
Attending: RADIOLOGY

## 2022-11-15 ENCOUNTER — APPOINTMENT (OUTPATIENT)
Dept: RADIATION ONCOLOGY | Facility: HOSPITAL | Age: 67
End: 2022-11-15
Attending: RADIOLOGY

## 2022-11-16 ENCOUNTER — APPOINTMENT (OUTPATIENT)
Dept: RADIATION ONCOLOGY | Facility: HOSPITAL | Age: 67
End: 2022-11-16
Attending: RADIOLOGY

## 2022-11-17 ENCOUNTER — APPOINTMENT (OUTPATIENT)
Dept: RADIATION ONCOLOGY | Facility: HOSPITAL | Age: 67
End: 2022-11-17
Attending: RADIOLOGY

## 2022-11-18 ENCOUNTER — APPOINTMENT (OUTPATIENT)
Dept: RADIATION ONCOLOGY | Facility: HOSPITAL | Age: 67
End: 2022-11-18
Attending: RADIOLOGY

## 2022-11-20 ENCOUNTER — APPOINTMENT (OUTPATIENT)
Dept: RADIATION ONCOLOGY | Facility: HOSPITAL | Age: 67
End: 2022-11-20
Attending: RADIOLOGY

## 2022-11-21 ENCOUNTER — APPOINTMENT (OUTPATIENT)
Dept: RADIATION ONCOLOGY | Facility: HOSPITAL | Age: 67
End: 2022-11-21
Attending: RADIOLOGY

## 2022-11-21 ENCOUNTER — APPOINTMENT (OUTPATIENT)
Dept: RADIATION ONCOLOGY | Facility: HOSPITAL | Age: 67
End: 2022-11-21

## 2022-11-22 ENCOUNTER — APPOINTMENT (OUTPATIENT)
Dept: RADIATION ONCOLOGY | Facility: HOSPITAL | Age: 67
End: 2022-11-22
Attending: RADIOLOGY

## 2022-11-23 ENCOUNTER — APPOINTMENT (OUTPATIENT)
Dept: RADIATION ONCOLOGY | Facility: HOSPITAL | Age: 67
End: 2022-11-23

## 2022-11-23 ENCOUNTER — APPOINTMENT (OUTPATIENT)
Dept: RADIATION ONCOLOGY | Facility: HOSPITAL | Age: 67
End: 2022-11-23
Attending: RADIOLOGY

## 2022-11-23 ENCOUNTER — PATIENT OUTREACH (OUTPATIENT)
Dept: HEMATOLOGY ONCOLOGY | Facility: CLINIC | Age: 67
End: 2022-11-23

## 2022-11-23 ENCOUNTER — APPOINTMENT (OUTPATIENT)
Dept: LAB | Facility: HOSPITAL | Age: 67
End: 2022-11-23

## 2022-11-23 NOTE — PROGRESS NOTES
Received message from pt stating there is an issue with his radiology appt and requesting call back  Returned call to pt and left voicemail stating I received his message but unable to make any changes to his radiation oncology appts and advised discussing when he goes for his next appt or to call    [phone]

## 2022-11-28 ENCOUNTER — APPOINTMENT (OUTPATIENT)
Dept: RADIATION ONCOLOGY | Facility: HOSPITAL | Age: 67
End: 2022-11-28
Attending: RADIOLOGY

## 2022-11-29 ENCOUNTER — APPOINTMENT (OUTPATIENT)
Dept: RADIATION ONCOLOGY | Facility: HOSPITAL | Age: 67
End: 2022-11-29
Attending: RADIOLOGY

## 2022-11-30 ENCOUNTER — APPOINTMENT (OUTPATIENT)
Dept: RADIATION ONCOLOGY | Facility: HOSPITAL | Age: 67
End: 2022-11-30
Attending: RADIOLOGY

## 2022-12-01 ENCOUNTER — APPOINTMENT (OUTPATIENT)
Dept: RADIATION ONCOLOGY | Facility: HOSPITAL | Age: 67
End: 2022-12-01
Attending: RADIOLOGY

## 2022-12-02 ENCOUNTER — APPOINTMENT (OUTPATIENT)
Dept: RADIATION ONCOLOGY | Facility: HOSPITAL | Age: 67
End: 2022-12-02
Attending: RADIOLOGY

## 2022-12-05 ENCOUNTER — APPOINTMENT (OUTPATIENT)
Dept: RADIATION ONCOLOGY | Facility: HOSPITAL | Age: 67
End: 2022-12-05
Attending: RADIOLOGY

## 2022-12-06 ENCOUNTER — APPOINTMENT (OUTPATIENT)
Dept: RADIATION ONCOLOGY | Facility: HOSPITAL | Age: 67
End: 2022-12-06
Attending: RADIOLOGY

## 2022-12-07 ENCOUNTER — APPOINTMENT (OUTPATIENT)
Dept: RADIATION ONCOLOGY | Facility: HOSPITAL | Age: 67
End: 2022-12-07
Attending: RADIOLOGY

## 2022-12-07 ENCOUNTER — APPOINTMENT (OUTPATIENT)
Dept: LAB | Facility: HOSPITAL | Age: 67
End: 2022-12-07

## 2022-12-08 ENCOUNTER — APPOINTMENT (OUTPATIENT)
Dept: RADIATION ONCOLOGY | Facility: HOSPITAL | Age: 67
End: 2022-12-08
Attending: RADIOLOGY

## 2022-12-09 ENCOUNTER — APPOINTMENT (OUTPATIENT)
Dept: RADIATION ONCOLOGY | Facility: HOSPITAL | Age: 67
End: 2022-12-09
Attending: RADIOLOGY

## 2022-12-12 ENCOUNTER — APPOINTMENT (OUTPATIENT)
Dept: RADIATION ONCOLOGY | Facility: HOSPITAL | Age: 67
End: 2022-12-12

## 2022-12-21 ENCOUNTER — PATIENT OUTREACH (OUTPATIENT)
Dept: HEMATOLOGY ONCOLOGY | Facility: CLINIC | Age: 67
End: 2022-12-21

## 2022-12-22 NOTE — PROGRESS NOTES
Received voicemail from pt asking what time his visit with Dr Nalini Farr is on Friday  Returned call to pt and left voicemail stating he does not have an appt on Frday with Dr Nalini Farr  His appt is Wednesday 12/28/22 at 3pm at the [facility] office  If this appt does not work, requested he call the urology office at [phone]

## 2022-12-27 ENCOUNTER — APPOINTMENT (OUTPATIENT)
Dept: LAB | Facility: HOSPITAL | Age: 67
End: 2022-12-27
Attending: UROLOGY

## 2022-12-27 DIAGNOSIS — C61 PROSTATE CANCER (HCC): ICD-10-CM

## 2022-12-27 LAB — PSA SERPL-MCNC: <0.1 NG/ML (ref 0–4)

## 2022-12-28 ENCOUNTER — OFFICE VISIT (OUTPATIENT)
Dept: UROLOGY | Facility: AMBULATORY SURGERY CENTER | Age: 67
End: 2022-12-28

## 2022-12-28 VITALS
WEIGHT: 204 LBS | BODY MASS INDEX: 28.56 KG/M2 | SYSTOLIC BLOOD PRESSURE: 140 MMHG | HEIGHT: 71 IN | DIASTOLIC BLOOD PRESSURE: 78 MMHG | RESPIRATION RATE: 18 BRPM

## 2022-12-28 DIAGNOSIS — C61 PROSTATE CANCER (HCC): Primary | ICD-10-CM

## 2022-12-28 DIAGNOSIS — N18.30 STAGE 3 CHRONIC KIDNEY DISEASE, UNSPECIFIED WHETHER STAGE 3A OR 3B CKD (HCC): ICD-10-CM

## 2022-12-28 NOTE — PROGRESS NOTES
12/28/2022    May Kat  1955  [id number]        Assessment  History [place] 7 prostate cancer status post robot-assisted laparoscopic prostatectomy, rising post prostatectomy PSA, PSMA CT PET scan concerning for pelvic lymphadenopathy, status post Lupron September 30, 2022, intimately asymptomatic left spermatocele      Discussion  I had a lengthy discussion in the office today with Zay Olivo regarding many issues  First we discussed that he has completed external beam radiation therapy as well as a single dose of 6-month depot of Lupron  We discussed that his PSA is now undetectable  We discussed that his undetectable PSA may in fact be from the Lupron  He understands that true postradiation PSA will be known after washout of the Lupron which was administered at the end of September 2022  Next, we discussed the abiraterone  He is still refusing to take this medication  We also discussed his small relatively asymptomatic left-sided pneumatocele  He is requesting that we coordinate excision of this spermatocele with Dr Arnold Sargent from [facility] who may be excising a small lesion on the patient's lip  The patient will contact us if he is planning to schedule surgery  Follow-up will be in March 2023 with his next PSA level  He will continue to follow with radiation oncology as well as medical oncology  History of Present Illness  79 y o  male with a history of an elevated PSA which led to a prostate biopsy revealing [place] 3+4 = 7/10 prostate cancer  In January 2021 he underwent a robot-assisted laparoscopic prostatectomy  His PSA jesus was 0 2  His PSA then deirdre as high as 0 9  This prompted a CT PSMA PET scan which revealed activity within the pelvic sidewall concerning for lymph node activity  He ultimately received a single 6-month dose of Lupron on September 30, 2022  He then went on to complete adjuvant external beam radiation therapy  He returns in follow-up today    He complains of hot flashes  His PSA fortunately is undetectable less than 0 1        AUA Symptom Score      Review of Systems  Review of Systems   Constitutional: Negative  HENT: Negative  Eyes: Negative  Respiratory: Negative  Cardiovascular: Negative  Gastrointestinal: Negative  Endocrine: Negative  Genitourinary:        Per HPI   Musculoskeletal: Negative  Skin: Negative  Allergic/Immunologic: Negative  Neurological: Negative  Hematological: Negative  Psychiatric/Behavioral: Negative  Past Medical History  Past Medical History:   Diagnosis Date   • Anal fissure    • Basal cell carcinoma (BCC)    • Basal cell carcinoma (BCC) of face    • Cancer [facility])    • Colon polyp    • Elevated PSA    • GERD (gastroesophageal reflux disease)    • Prostate cancer [facility])        Past Social History  Past Surgical History:   Procedure Laterality Date   • COLONOSCOPY  05/2020    [facility]    • EGD     • HERNIA REPAIR      inguinal   • HI EXC B9 LES MRGN XCP SK TG F/E/E/N/L/M 0 6-1  0CM Right 4/6/2021    Procedure: FACIAL LESION EXCISION WITH FROZEN SECTION AND MULTI LAYER CLOSURE ;  Surgeon: Vaibhav Smith MD;  Location:  MAIN OR;  Service: General   • HI LAPS SURG [id number] RPBIC RAD W/NRV SPARING ROBOT N/A 1/20/2021    Procedure: Agata Patel  W ROBOTICS;  Surgeon: Batool Castañeda MD;  Location: [facility] MA[facility] OR;  Service: Urology   • HI PROSTATE NEEDLE BIOPSY ANY APPROACH N/A 9/11/2020    Procedure: TRANSRECTAL NEEDLE BIOPSY PROSTATE (TRNBP);   Surgeon: Batool Castañeda MD;  Location: [facility];  Service: Urology   • SPERMATOCELECTOMY  02/2018    right spermatocelectomy and multiple lipoma removal   • US GUIDED PROSTATE BIOPSY  9/11/2020       Past Family History  Family History   Problem Relation Age of Onset   • Uterine cancer Sister        Past Social history  Social History     Socioeconomic History   • Marital status: Single     Spouse name: Not on file   • Number of children: Not on file   • Years of education: Not on file   • Highest education level: Not on file   Occupational History   • Occupation: self employed   Tobacco Use   • Smoking status: Former     Packs/day: 1 00     Years: 3 00     Pack years: 3 00     Types: Cigarettes     Quit date: 80     Years since quittin 0   • Smokeless tobacco: Never   Vaping Use   • Vaping Use: Never used   Substance and Sexual Activity   • Alcohol use: Yes     Alcohol/week: 10 0 standard drinks     Types: 10 Cans of beer per week     Comment: Weekends   • Drug use: Yes     Frequency: 2 0 times per week     Types: Marijuana     Comment: Weekends   • Sexual activity: Yes     Partners: Female   Other Topics Concern   • Not on file   Social History Narrative    Most recent tobacco use screenin2018    Live alone or with others: with others    Marital status: Single    Occupation: self employed    Are you currently employed: Yes    Alcohol intake: Occasional     Social Determinants of Health     Financial Resource Strain: Not on file   Food Insecurity: Not on file   Transportation Needs: Not on file   Physical Activity: Not on file   Stress: Not on file   Social Connections: Not on file   Intimate Partner Violence: Not on file   Housing Stability: Not on file       Current Medications  Current Outpatient Medications   Medication Sig Dispense Refill   • Bioflavonoid Products (STEPHANY-C PO) Take by mouth daily as needed     • omeprazole (PriLOSEC) 40 MG capsule Take 40 mg by mouth daily     • pantoprazole (PROTONIX) 40 mg tablet Take 40 mg by mouth daily as needed     • abiraterone (ZYTIGA) 250 mg tablet Take 4 tablets (1,000 mg total) by mouth daily (Patient not taking: Reported on 2022) 120 tablet 3   • predniSONE 5 mg tablet Take 1 tablet (5 mg total) by mouth 2 (two) times a day with meals (Patient not taking: Reported on 2022) 60 tablet 11     No current facility-administered medications for this visit  Allergies  Allergies   Allergen Reactions   • Other      DUST   • Tylenol [Acetaminophen]      RINGING IN EARS       Past Medical History, Social History, Family History, medications and allergies were reviewed  Vitals  Vitals:    12/28/22 1506   BP: 140/78   BP Location: Right arm   Patient Position: Sitting   Cuff Size: Standard   Resp: 18   Weight: 92 5 kg (204 lb)   Height: 5' 11" (1 803 m)       Physical Exam  Physical Exam  On examination he is in no acute distress  His abdomen is soft nontender nondistended  Incisions are well-healed  1 5 cm left spermatocele is appreciated on the head of the left epididymis  Skin is warm  Extremities without edema    Neurologic grossly intact and nonfocal   Gait normal   Affect normal       Results  Lab Results   Component Value Date    PSA <0 1 12/27/2022    PSA 0 9 08/18/2022    PSA 0 6 06/16/2022     Lab Results   Component Value Date    CALCIUM 10 0 08/18/2022    K 4 4 08/18/2022    CO2 26 08/18/2022     08/18/2022    BUN 18 08/18/2022    CREATININE 1 24 08/18/2022     Lab Results   Component Value Date    WBC 5 80 12/07/2022    HGB 14 5 12/07/2022    HCT 41 7 12/07/2022    MCV 88 12/07/2022     12/07/2022         Office Urine Dip  No results found for this or any previous visit (from the past 1 hour(s)) ]

## 2022-12-28 NOTE — LETTER
December 28, 2022     Ivan Koehler MD  [address] [address]    Patient: Jenise Baker   YOB: 1955   Date of Visit: 12/28/2022       Dear Dr Radha Armstrong: Thank you for referring Jenise Baker to me for evaluation  Below are my notes for this consultation  If you have questions, please do not hesitate to call me  I look forward to following your patient along with you  Sincerely,        Meggan Salazar MD        CC: Eulis Loma, MD Dyke Libman, RN Cleven Haley, MD Neva Captain, MD  12/28/2022  3:41 PM  Sign when Signing Visit  12/28/2022    Jenise Baker  1955  [id number]        Assessment  History Mario 7 prostate cancer status post robot-assisted laparoscopic prostatectomy, rising post prostatectomy PSA, PSMA CT PET scan concerning for pelvic lymphadenopathy, status post Lupron September 30, 2022, intimately asymptomatic left spermatocele      Discussion  I had a lengthy discussion in the office today with Nicol Watson regarding many issues  First we discussed that he has completed external beam radiation therapy as well as a single dose of 6-month depot of Lupron  We discussed that his PSA is now undetectable  We discussed that his undetectable PSA may in fact be from the Lupron  He understands that true postradiation PSA will be known after washout of the Lupron which was administered at the end of September 2022  Next, we discussed the abiraterone  He is still refusing to take this medication  We also discussed his small relatively asymptomatic left-sided pneumatocele  He is requesting that we coordinate excision of this spermatocele with Dr Shaila Anne from [facility] who may be excising a small lesion on the patient's lip  The patient will contact us if he is planning to schedule surgery  Follow-up will be in March 2023 with his next PSA level  He will continue to follow with radiation oncology as well as medical oncology          History of Present Illness  79 y o  male with a history of an elevated PSA which led to a prostate biopsy revealing [place] 3+4 = 7/10 prostate cancer  In January 2021 he underwent a robot-assisted laparoscopic prostatectomy  His PSA jesus was 0 2  His PSA then deirdre as high as 0 9  This prompted a CT PSMA PET scan which revealed activity within the pelvic sidewall concerning for lymph node activity  He ultimately received a single 6-month dose of Lupron on September 30, 2022  He then went on to complete adjuvant external beam radiation therapy  He returns in follow-up today  He complains of hot flashes  His PSA fortunately is undetectable less than 0 1        AUA Symptom Score      Review of Systems  Review of Systems   Constitutional: Negative  HENT: Negative  Eyes: Negative  Respiratory: Negative  Cardiovascular: Negative  Gastrointestinal: Negative  Endocrine: Negative  Genitourinary:        Per HPI   Musculoskeletal: Negative  Skin: Negative  Allergic/Immunologic: Negative  Neurological: Negative  Hematological: Negative  Psychiatric/Behavioral: Negative  Past Medical History  Past Medical History:   Diagnosis Date   • Anal fissure    • Basal cell carcinoma (BCC)    • Basal cell carcinoma (BCC) of face    • Cancer [facility])    • Colon polyp    • Elevated PSA    • GERD (gastroesophageal reflux disease)    • Prostate cancer [facility])        Past Social History  Past Surgical History:   Procedure Laterality Date   • COLONOSCOPY  05/2020    [facility]    • EGD     • HERNIA REPAIR      inguinal   • RI EXC B9 LES MRGN XCP SK TG F/E/E/N/L/M 0 6-1  0CM Right 4/6/2021    Procedure: FACIAL LESION EXCISION WITH FROZEN SECTION AND MULTI LAYER CLOSURE ;  Surgeon: Esa Shah MD;  Location:  MAIN OR;  Service: General   • RI LAPS SURG [id number] RPBIC RAD W/NRV SPARING ROBOT N/A 1/20/2021    Procedure: Danii PLASCENCIA ROBOTICS;  Surgeon: Bandar Deleon MD; Location: [facility] MAIN [facility];  Service: Urology   • SD PROSTATE NEEDLE BIOPSY ANY APPROACH N/A 2020    Procedure: TRANSRECTAL NEEDLE BIOPSY PROSTATE (TRNBP); Surgeon: Marion Heredia MD;  Location: [facility];  Service: Urology   • SPERMATOCELECTOMY  2018    right spermatocelectomy and multiple lipoma removal   • US GUIDED PROSTATE BIOPSY  2020       Past Family History  Family History   Problem Relation Age of Onset   • Uterine cancer Sister        Past Social history  Social History     Socioeconomic History   • Marital status: Single     Spouse name: Not on file   • Number of children: Not on file   • Years of education: Not on file   • Highest education level: Not on file   Occupational History   • Occupation: self employed   Tobacco Use   • Smoking status: Former     Packs/day: 1 00     Years: 3 00     Pack years: 3 00     Types: Cigarettes     Quit date:      Years since quittin 0   • Smokeless tobacco: Never   Vaping Use   • Vaping Use: Never used   Substance and Sexual Activity   • Alcohol use: Yes     Alcohol/week: 10 0 standard drinks     Types: 10 Cans of beer per week     Comment: Weekends   • Drug use: Yes     Frequency: 2 0 times per week     Types: Marijuana     Comment: Weekends   • Sexual activity: Yes     Partners: Female   Other Topics Concern   • Not on file   Social History Narrative    Most recent tobacco use screenin2018    Live alone or with others: with others    Marital status: Single    Occupation: self employed    Are you currently employed:  Yes    Alcohol intake: Occasional     Social Determinants of Health     Financial Resource Strain: Not on file   Food Insecurity: Not on file   Transportation Needs: Not on file   Physical Activity: Not on file   Stress: Not on file   Social Connections: Not on file   Intimate Partner Violence: Not on file   Housing Stability: Not on file       Current Medications  Current Outpatient Medications   Medication Sig Dispense Refill • Bioflavonoid Products (STEPHANY-C PO) Take by mouth daily as needed     • omeprazole (PriLOSEC) 40 MG capsule Take 40 mg by mouth daily     • pantoprazole (PROTONIX) 40 mg tablet Take 40 mg by mouth daily as needed     • abiraterone (ZYTIGA) 250 mg tablet Take 4 tablets (1,000 mg total) by mouth daily (Patient not taking: Reported on 9/22/2022) 120 tablet 3   • predniSONE 5 mg tablet Take 1 tablet (5 mg total) by mouth 2 (two) times a day with meals (Patient not taking: Reported on 9/22/2022) 60 tablet 11     No current facility-administered medications for this visit  Allergies  Allergies   Allergen Reactions   • Other      DUST   • Tylenol [Acetaminophen]      RINGING IN EARS       Past Medical History, Social History, Family History, medications and allergies were reviewed  Vitals  Vitals:    12/28/22 1506   BP: 140/78   BP Location: Right arm   Patient Position: Sitting   Cuff Size: Standard   Resp: 18   Weight: 92 5 kg (204 lb)   Height: 5' 11" (1 803 m)       Physical Exam  Physical Exam  On examination he is in no acute distress  His abdomen is soft nontender nondistended  Incisions are well-healed  1 5 cm left spermatocele is appreciated on the head of the left epididymis  Skin is warm  Extremities without edema    Neurologic grossly intact and nonfocal   Gait normal   Affect normal       Results  Lab Results   Component Value Date    PSA <0 1 12/27/2022    PSA 0 9 08/18/2022    PSA 0 6 06/16/2022     Lab Results   Component Value Date    CALCIUM 10 0 08/18/2022    K 4 4 08/18/2022    CO2 26 08/18/2022     08/18/2022    BUN 18 08/18/2022    CREATININE 1 24 08/18/2022     Lab Results   Component Value Date    WBC 5 80 12/07/2022    HGB 14 5 12/07/2022    HCT 41 7 12/07/2022    MCV 88 12/07/2022     12/07/2022         Office Urine Dip  No results found for this or any previous visit (from the past 1 hour(s)) ]

## 2023-01-05 ENCOUNTER — PROCEDURE VISIT (OUTPATIENT)
Dept: SURGERY | Facility: CLINIC | Age: 68
End: 2023-01-05

## 2023-01-05 VITALS
TEMPERATURE: 98.4 F | RESPIRATION RATE: 18 BRPM | HEART RATE: 72 BPM | BODY MASS INDEX: 29.2 KG/M2 | HEIGHT: 70 IN | DIASTOLIC BLOOD PRESSURE: 78 MMHG | OXYGEN SATURATION: 98 % | WEIGHT: 204 LBS | SYSTOLIC BLOOD PRESSURE: 122 MMHG

## 2023-01-05 DIAGNOSIS — L81.9 PIGMENTED SKIN LESION: Primary | ICD-10-CM

## 2023-01-05 NOTE — PATIENT INSTRUCTIONS
I told him not to shave the area for a week    I also asked him to call back in 2 weeks so we can go over the path report

## 2023-01-05 NOTE — PROGRESS NOTES
Patient is a 80-year-old male who has had skin cancers removed previously  He comes in complaining of an area in the midportion of his chin a few millimeters down from the lip  He states that he shaves this and that it bleeds and then closes and the process repeats on a routine basis  He has a approximately a 3 mm raised pinkish area with a minute ulceration in the middle  This could easily be a skin cancer but is more probably a keratotic lesion  We spoke about it and I suggested I do a shave biopsy and fulguration  If that is anything other than an invasive cancer, it will take care of it  If not I will have to do a more formal excision  Biopsy    Date/Time: 1/5/2023 10:52 AM  Performed by: Eb Burgos MD  Authorized by: Eb Burgos MD   Universal Protocol:  Consent: Verbal consent obtained  Consent given by: patient  Patient identity confirmed: verbally with patient      Procedure Details - Lesion Biopsy: Body area:  [address]    Head/neck location:  Chin    Biopsy method: shave biopsy      Biopsy tissue type: skin    Initial size (mm):  3    Final defect size (mm):  4    Malignancy: malignancy unknown       The patient was identified by me and the area was marked and then prepped and draped  Quarter percent Marcaine with epinephrine, neutralized, was infiltrated  Shave biopsy is done and then the base was fulgurated    Neosporin was applied

## 2023-02-02 ENCOUNTER — TELEMEDICINE (OUTPATIENT)
Dept: RADIATION ONCOLOGY | Facility: HOSPITAL | Age: 68
End: 2023-02-02
Attending: RADIOLOGY

## 2023-02-02 DIAGNOSIS — C61 PROSTATE CANCER (HCC): Primary | ICD-10-CM

## 2023-02-02 NOTE — PROGRESS NOTES
Virtual Brief Visit    Patient is located in the following state in which I hold an active license [place]      Assessment/Plan: The patient is 6 weeks post salvage radiation therapy post prostatectomy  He has tolerated treatment well without any significant side effects  In particular his bowel movements have returned to baseline  His PSA is less than 0 1 however he is also had Lupron injection  He has met with Dr Tracy Lawrence and is currently declining any additional systemic treatment  He will return for follow-up visit in 6 months with urology visit in between  Problem List Items Addressed This Visit        Genitourinary    Prostate cancer [facility]) - Primary       Recent Visits  No visits were found meeting these conditions  Showing recent visits within past 7 days and meeting all other requirements  Future Appointments  No visits were found meeting these conditions    Showing future appointments within next 150 days and meeting all other requirements         Visit Time      Total Visit Duration: 15 minutes

## 2023-02-28 ENCOUNTER — TELEPHONE (OUTPATIENT)
Dept: OBGYN CLINIC | Facility: OTHER | Age: 68
End: 2023-02-28

## 2023-02-28 NOTE — TELEPHONE ENCOUNTER
Medical necessity completed and faxed to [facility] at [phone]

## 2023-02-28 NOTE — TELEPHONE ENCOUNTER
CALL RETURN FORM   Reason for patient call? Lissette Garcia from [employer] called asking for status on a medical necessity request form  Patient's primary oncologist? Dr Audery Lesches    Name of person the patient was calling for?  Dr Audery Lesches staff    Any additional information to add, if applicable? n/a   Informed patient that the message will be forwarded to the team and someone will get back to them as soon as possible    Did you relay this information to the patient? n/a

## 2023-04-07 ENCOUNTER — APPOINTMENT (OUTPATIENT)
Dept: LAB | Facility: HOSPITAL | Age: 68
End: 2023-04-07
Attending: UROLOGY

## 2023-04-07 DIAGNOSIS — C61 PROSTATE CANCER (HCC): ICD-10-CM

## 2023-04-07 LAB — PSA SERPL-MCNC: <0.1 NG/ML (ref 0–4)

## 2023-06-16 ENCOUNTER — PROCEDURE VISIT (OUTPATIENT)
Dept: SURGERY | Facility: CLINIC | Age: 68
End: 2023-06-16
Payer: COMMERCIAL

## 2023-06-16 VITALS
TEMPERATURE: 97.4 F | OXYGEN SATURATION: 98 % | DIASTOLIC BLOOD PRESSURE: 66 MMHG | SYSTOLIC BLOOD PRESSURE: 118 MMHG | BODY MASS INDEX: 30.21 KG/M2 | HEART RATE: 62 BPM | WEIGHT: 211 LBS | HEIGHT: 70 IN

## 2023-06-16 DIAGNOSIS — L81.9 PIGMENTED SKIN LESION: Primary | ICD-10-CM

## 2023-06-16 PROCEDURE — 99212 OFFICE O/P EST SF 10 MIN: CPT | Performed by: SURGERY

## 2023-06-16 NOTE — LETTER
June 16, 2023     Mirella Lira MD  [facility] [address]  [address] [facility]    Patient: Peri Harley   YOB: 1955   Date of Visit: 6/16/2023       Dear Dr Carlos Leon: Thank you for referring Peri Harley to me for evaluation  Below are my notes for this consultation  If you have questions, please do not hesitate to call me  I look forward to following your patient along with you  Sincerely,        Ronan Noyola MD        CC: No Recipients    Ronan Noyola MD  6/16/2023  8:59 AM  Signed  The patient is a 22-year-old male who I know from doing multiple small procedures on in the past   He comes in today complaining of 4 separate lesions, as follows:  Upper back to left of the midline: Deep 1 cm cyst  Mid back: Soft pink mole, no evidence of malignancy  Upper back right: 2 mm whitish lesion, benign  Right elbow: 2 mm to 3 mm subcutaneous foreign body  This is nontender and has been present for 4 years  It looks like a lead pencil tip    I explained to him that none of these lesions need to be removed and I sent him on his way

## 2023-06-16 NOTE — PROGRESS NOTES
The patient is a 75-year-old male who I know from doing multiple small procedures on in the past   He comes in today complaining of 4 separate lesions, as follows:  Upper back to left of the midline: Deep 1 cm cyst  Mid back: Soft pink mole, no evidence of malignancy  Upper back right: 2 mm whitish lesion, benign  Right elbow: 2 mm to 3 mm subcutaneous foreign body  This is nontender and has been present for 4 years  It looks like a lead pencil tip    I explained to him that none of these lesions need to be removed and I sent him on his way

## 2023-07-21 ENCOUNTER — APPOINTMENT (OUTPATIENT)
Dept: LAB | Facility: HOSPITAL | Age: 68
End: 2023-07-21
Attending: UROLOGY
Payer: COMMERCIAL

## 2023-07-21 DIAGNOSIS — C61 PROSTATE CANCER (HCC): ICD-10-CM

## 2023-07-21 LAB
ALBUMIN SERPL BCP-MCNC: 4.4 G/DL (ref 3.5–5)
ALP SERPL-CCNC: 92 U/L (ref 34–104)
ALT SERPL W P-5'-P-CCNC: 18 U/L (ref 7–52)
ANION GAP SERPL CALCULATED.3IONS-SCNC: 8 MMOL/L
AST SERPL W P-5'-P-CCNC: 16 U/L (ref 13–39)
BILIRUB SERPL-MCNC: 0.61 MG/DL (ref 0.2–1)
BUN SERPL-MCNC: 20 MG/DL (ref 5–25)
CALCIUM SERPL-MCNC: 9.3 MG/DL (ref 8.4–10.2)
CHLORIDE SERPL-SCNC: 105 MMOL/L (ref 96–108)
CO2 SERPL-SCNC: 26 MMOL/L (ref 21–32)
CREAT SERPL-MCNC: 1 MG/DL (ref 0.6–1.3)
GFR SERPL CREATININE-BSD FRML MDRD: 76 ML/MIN/1.73SQ M
GLUCOSE SERPL-MCNC: 93 MG/DL (ref 65–140)
POTASSIUM SERPL-SCNC: 3.8 MMOL/L (ref 3.5–5.3)
PROT SERPL-MCNC: 7.2 G/DL (ref 6.4–8.4)
PSA SERPL-MCNC: <0.01 NG/ML (ref 0–4)
SODIUM SERPL-SCNC: 139 MMOL/L (ref 135–147)

## 2023-07-21 PROCEDURE — 80053 COMPREHEN METABOLIC PANEL: CPT

## 2023-07-21 PROCEDURE — 36415 COLL VENOUS BLD VENIPUNCTURE: CPT

## 2023-07-21 PROCEDURE — 84153 ASSAY OF PSA TOTAL: CPT

## 2023-10-12 ENCOUNTER — APPOINTMENT (OUTPATIENT)
Dept: LAB | Facility: HOSPITAL | Age: 68
End: 2023-10-12
Attending: UROLOGY
Payer: COMMERCIAL

## 2023-10-12 DIAGNOSIS — C61 PROSTATE CANCER (HCC): ICD-10-CM

## 2023-10-12 LAB — PSA SERPL-MCNC: <0.01 NG/ML (ref 0–4)

## 2023-10-12 PROCEDURE — 36415 COLL VENOUS BLD VENIPUNCTURE: CPT

## 2023-10-12 PROCEDURE — 84153 ASSAY OF PSA TOTAL: CPT

## 2023-10-17 ENCOUNTER — OFFICE VISIT (OUTPATIENT)
Dept: UROLOGY | Facility: AMBULATORY SURGERY CENTER | Age: 68
End: 2023-10-17
Payer: COMMERCIAL

## 2023-10-17 VITALS
HEART RATE: 88 BPM | DIASTOLIC BLOOD PRESSURE: 78 MMHG | SYSTOLIC BLOOD PRESSURE: 128 MMHG | HEIGHT: 70 IN | WEIGHT: 202 LBS | BODY MASS INDEX: 28.92 KG/M2 | RESPIRATION RATE: 18 BRPM | OXYGEN SATURATION: 98 %

## 2023-10-17 DIAGNOSIS — C61 PROSTATE CANCER (HCC): Primary | ICD-10-CM

## 2023-10-17 PROCEDURE — 99213 OFFICE O/P EST LOW 20 MIN: CPT | Performed by: UROLOGY

## 2023-10-17 NOTE — PROGRESS NOTES
10/17/2023    Raymond Rodriguez  1955  [id number]        Assessment  Winifred 7 prostate cancer status post robot-assisted laparoscopic prostatectomy, status post adjuvant radiation therapy    Discussion  I provided Ronny with reassurance that his PSA is undetectable at less than 0.01. I recommend that he return in 1 year with a PSA and I will also check a PSA in 6 months time. I have recommended a referral for pelvic floor physical therapy both for bladder training as well as occasional fecal incontinence. He defers. He defers PDE 5 inhibitors. He defers additional intracavernosal injection. He defers inflatable penile prosthetic. History of Present Illness  76 y.o. male with a history of Mario 7 prostate cancer diagnosed and treated January 2021 with robot-assisted laparoscopic prostatectomy his PSA jesus was 0.2 and then quickly deirdre to 0.9. This prompted a CT PSMA PET scan which showed activity within the pelvic sidewall lymph node concerning for disease. He received a single 6-month dose of Lupron in September 2022 and then went on to receive pelvic radiation. He was referred to medical oncology. He but opted to not receive this. He returns for routine follow-up today. Fortunately his PSA remains undetectable at then 0.01.          AUA Symptom Score      Review of Systems  Review of Systems   Constitutional: Negative. HENT: Negative. Eyes: Negative. Respiratory: Negative. Cardiovascular: Negative. Gastrointestinal: Negative. Endocrine: Negative. Genitourinary:         Per HPI   Musculoskeletal: Negative. Skin: Negative. Allergic/Immunologic: Negative. Neurological: Negative. Hematological: Negative. Psychiatric/Behavioral: Negative.            Past Medical History  Past Medical History:   Diagnosis Date    Anal fissure     Basal cell carcinoma (BCC)     Basal cell carcinoma (BCC) of face     Cancer (HCC)     Colon polyp     Elevated PSA     GERD (gastroesophageal reflux disease)     Prostate cancer [facility])        Past Social History  Past Surgical History:   Procedure Laterality Date    COLONOSCOPY  2020    [facility]     EGD      HERNIA REPAIR      inguinal    UT EXC B9 LES MRGN XCP SK TG F/E/E/N/L/M 0.6-1. 0CM Right 2021    Procedure: FACIAL LESION EXCISION WITH FROZEN SECTION AND MULTI LAYER CLOSURE.;  Surgeon: Carissa Dumont MD;  Location: [facility];  Service: General    UT LAPS SURG [id number] RPBIC RAD W/NRV SPARING ROBOT N/A 2021    Procedure: Lety Seat  W ROBOTICS;  Surgeon: Jennifer Olivares MD;  Location: [facility];  Service: Urology    UT PROSTATE NEEDLE BIOPSY ANY APPROACH N/A 2020    Procedure: TRANSRECTAL NEEDLE BIOPSY PROSTATE (TRNBP); Surgeon: Jennifer Olivares MD;  Location: [facility];  Service: Urology    SPERMATOCELECTOMY  2018    right spermatocelectomy and multiple lipoma removal    US GUIDED PROSTATE BIOPSY  2020       Past Family History  Family History   Problem Relation Age of Onset    Uterine cancer Sister        Past Social history  Social History     Socioeconomic History    Marital status: Single     Spouse name: Not on file    Number of children: Not on file    Years of education: Not on file    Highest education level: Not on file   Occupational History    Occupation: self employed   Tobacco Use    Smoking status: Former     Packs/day: 1.00     Years: 3.00     Total pack years: 3.00     Types: Cigarettes     Quit date:      Years since quittin.8    Smokeless tobacco: Never   Vaping Use    Vaping Use: Never used   Substance and Sexual Activity    Alcohol use:  Yes     Alcohol/week: 10.0 standard drinks of alcohol     Types: 10 Cans of beer per week     Comment: Weekends    Drug use: Yes     Frequency: 2.0 times per week     Types: Marijuana     Comment: Weekends    Sexual activity: Yes     Partners: Female   Other Topics Concern    Not on file   Social History Narrative Most recent tobacco use screenin2018    Live alone or with others: with others    Marital status: Single    Occupation: self employed    Are you currently employed: Yes    Alcohol intake: Occasional     Social Determinants of Health     Financial Resource Strain: Not on file   Food Insecurity: Not on file   Transportation Needs: Not on file   Physical Activity: Not on file   Stress: Not on file   Social Connections: Not on file   Intimate Partner Violence: Not on file   Housing Stability: Not on file       Current Medications  Current Outpatient Medications   Medication Sig Dispense Refill    Bioflavonoid Products (STEPHANY-C PO) Take by mouth daily as needed      omeprazole (PriLOSEC) 40 MG capsule Take 40 mg by mouth daily      pantoprazole (PROTONIX) 40 mg tablet Take 40 mg by mouth daily as needed      abiraterone (ZYTIGA) 250 mg tablet Take 4 tablets (1,000 mg total) by mouth daily (Patient not taking: Reported on 2022) 120 tablet 3    predniSONE 5 mg tablet Take 1 tablet (5 mg total) by mouth 2 (two) times a day with meals (Patient not taking: Reported on 2022) 60 tablet 11     No current facility-administered medications for this visit. Allergies  Allergies   Allergen Reactions    Other      DUST    Tylenol [Acetaminophen]      RINGING IN EARS       Past Medical History, Social History, Family History, medications and allergies were reviewed. Vitals  Vitals:    10/17/23 1502   BP: 128/78   BP Location: Left arm   Patient Position: Sitting   Cuff Size: Standard   Pulse: 88   Resp: 18   SpO2: 98%   Weight: 91.6 kg (202 lb)   Height: 5' 10" (1.778 m)       Physical Exam  Physical Exam    Emanation he is in no acute distress.   Gait normal.  Affect    Results  Lab Results   Component Value Date    PSA <0.01 10/12/2023    PSA <0.01 2023    PSA <0.1 2023     Lab Results   Component Value Date    CALCIUM 9.3 2023    K 3.8 2023    CO2 26 2023     07/21/2023    BUN 20 07/21/2023    CREATININE 1.00 07/21/2023     Lab Results   Component Value Date    WBC 5.80 12/07/2022    HGB 14.5 12/07/2022    HCT 41.7 12/07/2022    MCV 88 12/07/2022     12/07/2022         Office Urine Dip  No results found for this or any previous visit (from the past 1 hour(s)).]

## 2024-04-03 ENCOUNTER — APPOINTMENT (OUTPATIENT)
Dept: LAB | Facility: HOSPITAL | Age: 69
End: 2024-04-03
Payer: COMMERCIAL

## 2024-04-03 DIAGNOSIS — C61 PROSTATE CANCER (HCC): ICD-10-CM

## 2024-04-03 DIAGNOSIS — Z00.00 ROUTINE GENERAL MEDICAL EXAMINATION AT A HEALTH CARE FACILITY: ICD-10-CM

## 2024-04-03 DIAGNOSIS — E78.2 MIXED HYPERLIPIDEMIA: ICD-10-CM

## 2024-04-03 DIAGNOSIS — I10 ESSENTIAL HYPERTENSION, MALIGNANT: ICD-10-CM

## 2024-04-03 LAB
ALBUMIN SERPL BCP-MCNC: 4.4 G/DL (ref 3.5–5)
ALP SERPL-CCNC: 81 U/L (ref 34–104)
ALT SERPL W P-5'-P-CCNC: 21 U/L (ref 7–52)
ANION GAP SERPL CALCULATED.3IONS-SCNC: 8 MMOL/L (ref 4–13)
AST SERPL W P-5'-P-CCNC: 19 U/L (ref 13–39)
BACTERIA UR QL AUTO: ABNORMAL /HPF
BASOPHILS # BLD AUTO: 0.05 THOUSANDS/ÂΜL (ref 0–0.1)
BASOPHILS NFR BLD AUTO: 1 % (ref 0–1)
BILIRUB SERPL-MCNC: 0.63 MG/DL (ref 0.2–1)
BILIRUB UR QL STRIP: NEGATIVE
BUN SERPL-MCNC: 18 MG/DL (ref 5–25)
CALCIUM SERPL-MCNC: 9.6 MG/DL (ref 8.4–10.2)
CHLORIDE SERPL-SCNC: 102 MMOL/L (ref 96–108)
CHOLEST SERPL-MCNC: 207 MG/DL
CLARITY UR: CLEAR
CO2 SERPL-SCNC: 30 MMOL/L (ref 21–32)
COLOR UR: YELLOW
CREAT SERPL-MCNC: 0.88 MG/DL (ref 0.6–1.3)
EOSINOPHIL # BLD AUTO: 0.2 THOUSAND/ÂΜL (ref 0–0.61)
EOSINOPHIL NFR BLD AUTO: 3 % (ref 0–6)
ERYTHROCYTE [DISTWIDTH] IN BLOOD BY AUTOMATED COUNT: 13.3 % (ref 11.6–15.1)
ERYTHROCYTE [SEDIMENTATION RATE] IN BLOOD: 9 MM/HOUR (ref 0–19)
GFR SERPL CREATININE-BSD FRML MDRD: 87 ML/MIN/1.73SQ M
GLUCOSE P FAST SERPL-MCNC: 110 MG/DL (ref 65–99)
GLUCOSE UR STRIP-MCNC: NEGATIVE MG/DL
HCT VFR BLD AUTO: 46.8 % (ref 36.5–49.3)
HDLC SERPL-MCNC: 47 MG/DL
HGB BLD-MCNC: 15.3 G/DL (ref 12–17)
HGB UR QL STRIP.AUTO: NEGATIVE
IMM GRANULOCYTES # BLD AUTO: 0.02 THOUSAND/UL (ref 0–0.2)
IMM GRANULOCYTES NFR BLD AUTO: 0 % (ref 0–2)
KETONES UR STRIP-MCNC: NEGATIVE MG/DL
LDH SERPL-CCNC: 127 U/L (ref 140–271)
LDLC SERPL CALC-MCNC: 116 MG/DL (ref 0–100)
LEUKOCYTE ESTERASE UR QL STRIP: NEGATIVE
LYMPHOCYTES # BLD AUTO: 1.07 THOUSANDS/ÂΜL (ref 0.6–4.47)
LYMPHOCYTES NFR BLD AUTO: 15 % (ref 14–44)
MCH RBC QN AUTO: 29 PG (ref 26.8–34.3)
MCHC RBC AUTO-ENTMCNC: 32.7 G/DL (ref 31.4–37.4)
MCV RBC AUTO: 89 FL (ref 82–98)
MONOCYTES # BLD AUTO: 0.6 THOUSAND/ÂΜL (ref 0.17–1.22)
MONOCYTES NFR BLD AUTO: 9 % (ref 4–12)
MUCOUS THREADS UR QL AUTO: ABNORMAL
NEUTROPHILS # BLD AUTO: 5.06 THOUSANDS/ÂΜL (ref 1.85–7.62)
NEUTS SEG NFR BLD AUTO: 72 % (ref 43–75)
NITRITE UR QL STRIP: NEGATIVE
NON-SQ EPI CELLS URNS QL MICRO: ABNORMAL /HPF
NONHDLC SERPL-MCNC: 160 MG/DL
NRBC BLD AUTO-RTO: 0 /100 WBCS
PH UR STRIP.AUTO: 6 [PH]
PLATELET # BLD AUTO: 248 THOUSANDS/UL (ref 149–390)
PMV BLD AUTO: 10.4 FL (ref 8.9–12.7)
POTASSIUM SERPL-SCNC: 4.1 MMOL/L (ref 3.5–5.3)
PROT SERPL-MCNC: 7.2 G/DL (ref 6.4–8.4)
PROT UR STRIP-MCNC: NEGATIVE MG/DL
PSA SERPL-MCNC: <0.01 NG/ML (ref 0–4)
PSA SERPL-MCNC: <0.01 NG/ML (ref 0–4)
RBC # BLD AUTO: 5.28 MILLION/UL (ref 3.88–5.62)
RBC #/AREA URNS AUTO: ABNORMAL /HPF
SODIUM SERPL-SCNC: 140 MMOL/L (ref 135–147)
SP GR UR STRIP.AUTO: 1.02 (ref 1–1.03)
TRIGL SERPL-MCNC: 218 MG/DL
TSH SERPL DL<=0.05 MIU/L-ACNC: 2.33 UIU/ML (ref 0.45–4.5)
UROBILINOGEN UR QL STRIP.AUTO: 0.2 E.U./DL
WBC # BLD AUTO: 7 THOUSAND/UL (ref 4.31–10.16)
WBC #/AREA URNS AUTO: ABNORMAL /HPF

## 2024-04-03 PROCEDURE — 85025 COMPLETE CBC W/AUTO DIFF WBC: CPT

## 2024-04-03 PROCEDURE — 84443 ASSAY THYROID STIM HORMONE: CPT

## 2024-04-03 PROCEDURE — 81001 URINALYSIS AUTO W/SCOPE: CPT

## 2024-04-03 PROCEDURE — G0103 PSA SCREENING: HCPCS

## 2024-04-03 PROCEDURE — 80053 COMPREHEN METABOLIC PANEL: CPT

## 2024-04-03 PROCEDURE — 80061 LIPID PANEL: CPT

## 2024-04-03 PROCEDURE — 83615 LACTATE (LD) (LDH) ENZYME: CPT

## 2024-04-03 PROCEDURE — 36415 COLL VENOUS BLD VENIPUNCTURE: CPT

## 2024-04-03 PROCEDURE — 85652 RBC SED RATE AUTOMATED: CPT

## 2024-05-02 RX ORDER — SODIUM CHLORIDE, SODIUM LACTATE, POTASSIUM CHLORIDE, CALCIUM CHLORIDE 600; 310; 30; 20 MG/100ML; MG/100ML; MG/100ML; MG/100ML
125 INJECTION, SOLUTION INTRAVENOUS CONTINUOUS
Status: CANCELLED | OUTPATIENT
Start: 2024-05-02

## 2024-05-02 RX ORDER — LIDOCAINE HYDROCHLORIDE 10 MG/ML
0.5 INJECTION, SOLUTION EPIDURAL; INFILTRATION; INTRACAUDAL; PERINEURAL ONCE AS NEEDED
Status: CANCELLED | OUTPATIENT
Start: 2024-05-02

## 2024-05-03 ENCOUNTER — HOSPITAL ENCOUNTER (OUTPATIENT)
Dept: GASTROENTEROLOGY | Facility: HOSPITAL | Age: 69
Setting detail: OUTPATIENT SURGERY
Discharge: HOME | End: 2024-05-03
Attending: COLON & RECTAL SURGERY | Admitting: COLON & RECTAL SURGERY
Payer: COMMERCIAL

## 2024-05-03 ENCOUNTER — ANESTHESIA (OUTPATIENT)
Dept: GASTROENTEROLOGY | Facility: HOSPITAL | Age: 69
End: 2024-05-03

## 2024-05-03 ENCOUNTER — ANESTHESIA EVENT (OUTPATIENT)
Dept: GASTROENTEROLOGY | Facility: HOSPITAL | Age: 69
End: 2024-05-03

## 2024-05-03 VITALS
SYSTOLIC BLOOD PRESSURE: 110 MMHG | TEMPERATURE: 97.8 F | HEART RATE: 71 BPM | RESPIRATION RATE: 18 BRPM | BODY MASS INDEX: 29.06 KG/M2 | DIASTOLIC BLOOD PRESSURE: 70 MMHG | HEIGHT: 70 IN | WEIGHT: 203 LBS | OXYGEN SATURATION: 98 %

## 2024-05-03 DIAGNOSIS — Z86.010 PERSONAL HISTORY OF COLONIC POLYPS: ICD-10-CM

## 2024-05-03 RX ORDER — LIDOCAINE HYDROCHLORIDE 10 MG/ML
0.5 INJECTION, SOLUTION EPIDURAL; INFILTRATION; INTRACAUDAL; PERINEURAL ONCE AS NEEDED
Status: DISCONTINUED | OUTPATIENT
Start: 2024-05-03 | End: 2024-05-07 | Stop reason: HOSPADM

## 2024-05-03 RX ORDER — SODIUM CHLORIDE, SODIUM LACTATE, POTASSIUM CHLORIDE, CALCIUM CHLORIDE 600; 310; 30; 20 MG/100ML; MG/100ML; MG/100ML; MG/100ML
125 INJECTION, SOLUTION INTRAVENOUS CONTINUOUS
Status: DISCONTINUED | OUTPATIENT
Start: 2024-05-03 | End: 2024-05-07 | Stop reason: HOSPADM

## 2024-05-03 RX ORDER — LIDOCAINE HYDROCHLORIDE 10 MG/ML
INJECTION, SOLUTION EPIDURAL; INFILTRATION; INTRACAUDAL; PERINEURAL AS NEEDED
Status: DISCONTINUED | OUTPATIENT
Start: 2024-05-03 | End: 2024-05-03

## 2024-05-03 RX ORDER — SODIUM CHLORIDE, SODIUM LACTATE, POTASSIUM CHLORIDE, CALCIUM CHLORIDE 600; 310; 30; 20 MG/100ML; MG/100ML; MG/100ML; MG/100ML
INJECTION, SOLUTION INTRAVENOUS CONTINUOUS PRN
Status: DISCONTINUED | OUTPATIENT
Start: 2024-05-03 | End: 2024-05-03

## 2024-05-03 RX ORDER — PROPOFOL 10 MG/ML
INJECTION, EMULSION INTRAVENOUS AS NEEDED
Status: DISCONTINUED | OUTPATIENT
Start: 2024-05-03 | End: 2024-05-03

## 2024-05-03 RX ADMIN — PROPOFOL 50 MG: 10 INJECTION, EMULSION INTRAVENOUS at 12:41

## 2024-05-03 RX ADMIN — SODIUM CHLORIDE, SODIUM LACTATE, POTASSIUM CHLORIDE, AND CALCIUM CHLORIDE: .6; .31; .03; .02 INJECTION, SOLUTION INTRAVENOUS at 12:27

## 2024-05-03 RX ADMIN — PROPOFOL 30 MG: 10 INJECTION, EMULSION INTRAVENOUS at 12:34

## 2024-05-03 RX ADMIN — PROPOFOL 20 MG: 10 INJECTION, EMULSION INTRAVENOUS at 12:44

## 2024-05-03 RX ADMIN — PROPOFOL 120 MG: 10 INJECTION, EMULSION INTRAVENOUS at 12:30

## 2024-05-03 RX ADMIN — PROPOFOL 50 MG: 10 INJECTION, EMULSION INTRAVENOUS at 12:36

## 2024-05-03 RX ADMIN — PROPOFOL 30 MG: 10 INJECTION, EMULSION INTRAVENOUS at 12:32

## 2024-05-03 RX ADMIN — LIDOCAINE HYDROCHLORIDE 50 MG: 10 INJECTION, SOLUTION EPIDURAL; INFILTRATION; INTRACAUDAL at 12:30

## 2024-05-03 RX ADMIN — PROPOFOL 20 MG: 10 INJECTION, EMULSION INTRAVENOUS at 12:37

## 2024-05-03 NOTE — H&P
EXAM note      Assessment & Plan         1. Anxiety and depression    2. Other seasonal allergic rhinitis    3. Skin-picking disorder    4. Allergic rhinitis, unspecified seasonality, unspecified trigger    5. Lipid screening    6. Screening for diabetes mellitus (DM)    7. Breast cancer screening      Medications refilled for her. Recommend she obtain fasting labs in the next several days that she is due for them. Will notify her of those results when available and any further recommendations.    Orders Placed This Encounter   • MAMMO Screen w Chin Bilateral   • Comprehensive Metabolic Panel   • Lipid Panel with Reflex   • cetirizine (ZYRTEC) 10 MG tablet   • montelukast (SINGULAIR) 10 MG tablet   • sertraline (ZOLOFT) 50 MG tablet           Return in about 6 months (around 3/14/2019) for Depression/anxiety.          History  Connie Whitlock is a 47 year old female who presents for follow-up of anxiety depression and history of cutting disorder. She's on sertraline 50 mg daily and denies any suicidal ideation or harmful intent. She's not had cutting issue since she's been on medication. PHQ 9 score of 0 today. She also needs refills of cirtirizine and montelukast which she uses for environmental allergies. This also works well for her.    Past Medical History:   Diagnosis Date   • Allergic rhinitis    • Concussion    • Impetigo     recurrent - sees Derm   • Kidney stones      ALLERGIES:   Allergen Reactions   • Peanut Other (See Comments)     \"Eyes twitchy\"     Current Outpatient Prescriptions   Medication Sig Dispense Refill   • cetirizine (ZYRTEC) 10 MG tablet Take 1 tablet by mouth daily. 30 tablet 11   • montelukast (SINGULAIR) 10 MG tablet Take 1 tablet by mouth every evening. 30 tablet 11   • sertraline (ZOLOFT) 50 MG tablet Take 1 tablet by mouth daily. 30 tablet 5   • fluticasone (FLONASE) 50 MCG/ACT nasal spray SHAKE LIQUID AND USE 2 SPRAYS IN EACH NOSTRIL DAILY 16 mL 2   • Probiotic Product (PROBIOTIC  History and Physical - [facility]  Ronny Bueno 69 y.o. male MRN: [id number]                  HPI: Ronny Bueno is a 69 y.o. year old male who presents for colonoscopy      REVIEW OF SYSTEMS: Per the HPI, and otherwise unremarkable.    Historical Information   Past Medical History:   Diagnosis Date    Anal fissure     Basal cell carcinoma (BCC)     Basal cell carcinoma (BCC) of face     Cancer (HCC)     Colon polyp     Elevated PSA     GERD (gastroesophageal reflux disease)     Prostate cancer (HCC)      Past Surgical History:   Procedure Laterality Date    COLONOSCOPY  05/2020    [facility]     EGD      HERNIA REPAIR      inguinal    MN EXC B9 LES MRGN XCP SK TG F/E/E/N/L/M 0.6-1.0CM Right 4/6/2021    Procedure: FACIAL LESION EXCISION WITH FROZEN SECTION AND MULTI LAYER CLOSURE.;  Surgeon: Robert Bloch, MD;  Location: [facility];  Service: General    MN LAPS SURG [id number] RPBIC RAD W/NRV SPARING ROBOT N/A 1/20/2021    Procedure: PROSTATECTOMY RADICAL LAPAROSCOPIC  W ROBOTICS;  Surgeon: Alexey Larson MD;  Location: [facility];  Service: Urology    MN PROSTATE NEEDLE BIOPSY ANY APPROACH N/A 9/11/2020    Procedure: TRANSRECTAL NEEDLE BIOPSY PROSTATE (TRNBP);  Surgeon: Alexey Larson MD;  Location: [facility];  Service: Urology    SPERMATOCELECTOMY  02/2018    right spermatocelectomy and multiple lipoma removal    US GUIDED PROSTATE BIOPSY  9/11/2020     Social History   Social History     Substance and Sexual Activity   Alcohol Use Yes    Alcohol/week: 10.0 standard drinks of alcohol    Types: 10 Cans of beer per week    Comment: Weekends     Social History     Substance and Sexual Activity   Drug Use Yes    Frequency: 2.0 times per week    Types: Marijuana    Comment: Weekends     Social History     Tobacco Use   Smoking Status Former    Current packs/day: 0.00    Average packs/day: 1 pack/day for 3.0 years (3.0 ttl pk-yrs)    Types: Cigarettes    Start date: 1970    Quit date: 1973    Years  "since quittin.3   Smokeless Tobacco Never     Family History   Problem Relation Age of Onset    Uterine cancer Sister        Meds/Allergies       Current Outpatient Medications:     Bioflavonoid Products (STEPHANY-C PO)    famotidine (PEPCID) 40 MG tablet    abiraterone (ZYTIGA) 250 mg tablet    predniSONE 5 mg tablet    Current Facility-Administered Medications:     lactated ringers infusion, 125 mL/hr, Intravenous, Continuous    lidocaine (PF) (XYLOCAINE-MPF) 1 % injection 0.5 mL, 0.5 mL, Infiltration, Once PRN    Allergies   Allergen Reactions    Other      DUST    Tylenol [Acetaminophen]      RINGING IN EARS       Objective     BP (!) 183/86   Pulse 61   Temp 97.7 °F (36.5 °C) (Temporal)   Resp 16   Ht 5' 10\" (1.778 m)   Wt 92.1 kg (203 lb)   SpO2 97%   BMI 29.13 kg/m²       PHYSICAL EXAM    Gen: NAD  Head: NCAT  CV: RRR  CHEST: Clear  ABD: soft, NT/ND  EXT: no edema      ASSESSMENT/PLAN:  This is a 69 y.o. year old male here for colonoscopy, and he is stable and optimized for his procedure.        " DAILY PO) Take  by mouth.     • Cholecalciferol (VITAMIN D-3 PO) Take 1,000 Int'l Units by mouth daily.     • Multiple Vitamins-Minerals (MULTIVITAMIN PO) Take  by mouth.     • triamcinolone (ARISTOCORT) 0.1 % cream Apply 1 application topically 2 times daily. Using PRN       No current facility-administered medications for this visit.      Past Surgical History:   Procedure Laterality Date   • Excision of lingual tonsil       Social History     Social History   • Marital status: Single     Spouse name: N/A   • Number of children: 0   • Years of education: N/A     Occupational History   • nanny      Social History Main Topics   • Smoking status: Never Smoker   • Smokeless tobacco: Never Used   • Alcohol use Yes      Comment: rarely   • Drug use: No   • Sexual activity: Not Currently     Other Topics Concern   • None     Social History Narrative   • None     Family History   Problem Relation Age of Onset   • Heart Father    • Hyperlipidemia Father    • High blood pressure Brother        Lab Results   Component Value Date    SODIUM 140 09/05/2017    GLUCOSE 85 09/05/2017    POTASSIUM 4.3 09/05/2017    CHLORIDE 104 09/05/2017    CO2 23 09/05/2017    CREATININE 0.74 09/05/2017    ANIONGAP 17 09/05/2017    CALCIUM 9.2 09/05/2017    GLOB 3.4 09/05/2017    ALBUMIN 3.7 09/05/2017    AST 27 09/05/2017    ALKPT 107 09/05/2017    GPT 20 09/05/2017     Lab Results   Component Value Date    CHOLESTEROL 169 09/05/2017    HDL 86 09/05/2017    CALCLDL 76 09/05/2017    TRIGLYCERIDE 35 09/05/2017       Review of systems  Constitutional:  Patient denies fever, chills, or fatigue.  Eyes:  Denies change in visual acuity.  HENT:  Denies sinonasal congestion, otalgia, rhinorrhea or sore throat.  Respiratory:  Denies cough or shortness of breath.  Cardiovascular:  Denies chest pain, palpitations or peripheral edema.  Gastrointestinal:  Denies abdominal pain, nausea, vomiting, hematochezia or diarrhea.  Genitourinary:  Denies dysuria,  urinary frequency, hematuria or nocturia.  Musculoskeletal:  Denies back pain, neck pain, or joint pain.  Integument:  Denies rash.  Neurologic:  Denies sensory or visual changes.  Lymphatic:  Denies swollen glands or weight loss.        Health Maintenance   Topic Date Due   • Influenza Vaccine (1) 09/01/2018   • Breast Cancer Screening  03/22/2019   • DTaP/Tdap/Td Vaccine (2 - Td) 11/05/2019   • Cervical Cancer Screening HPV CO-Testing  09/01/2020           Physical Exam  Vital Signs:    Vitals:    09/14/18 1324   BP: 128/74   Pulse: 68   Weight: 76.2 kg   Height: 5' 8\" (1.727 m)     Body mass index is 25.56 kg/m².    Constitutional:  Well groomed. In no acute distress. Eye contact and conversation appropriate for age.  Integument:  Warm and dry. No erythema or edema noted.  HENT:  Normocephalic. Atraumatic. Bilateral external ears normal. Tympanic membranes pearly gray with good light reflex bilaterally. Buccal mucosa pink and moist. Posterior oropharynx without erythema, edema or tonsillar exudates. Nasal mucosa normal. Patency maintained.  Neck:  Supple without lymphadenopathy or thyromegaly. Trachea midline.  Eyes: PERRL (pupils equal, round, and reactive to light), EOMI (extraocular movements are intact). Conjunctivae normal. No discharge. No nystagmus.  Cardiovascular:  Heart rate and rhythm regular without murmur.  Respiratory:  Clear to auscultation bilaterally.  Neurological:  Cranial nerves II through XII grossly intact. Gait normal.

## 2024-05-03 NOTE — H&P
History and Physical - [facility]  Ronny Bueno 69 y.o. male MRN: [id number]                  HPI: Ronny Bueno is a 69 y.o. year old male who presents for colonoscopy      REVIEW OF SYSTEMS: Per the HPI, and otherwise unremarkable.    Historical Information   Past Medical History:   Diagnosis Date    Anal fissure     Basal cell carcinoma (BCC)     Basal cell carcinoma (BCC) of face     Cancer (HCC)     Colon polyp     Elevated PSA     GERD (gastroesophageal reflux disease)     Prostate cancer (HCC)      Past Surgical History:   Procedure Laterality Date    COLONOSCOPY  05/2020    [facility]     EGD      HERNIA REPAIR      inguinal    LA EXC B9 LES MRGN XCP SK TG F/E/E/N/L/M 0.6-1.0CM Right 4/6/2021    Procedure: FACIAL LESION EXCISION WITH FROZEN SECTION AND MULTI LAYER CLOSURE.;  Surgeon: Robert Bloch, MD;  Location: [facility];  Service: General    LA LAPS SURG [id number] RPBIC RAD W/NRV SPARING ROBOT N/A 1/20/2021    Procedure: PROSTATECTOMY RADICAL LAPAROSCOPIC  W ROBOTICS;  Surgeon: Alexey Larson MD;  [facility];  Service: Urology    LA PROSTATE NEEDLE BIOPSY ANY APPROACH N/A 9/11/2020    Procedure: TRANSRECTAL NEEDLE BIOPSY PROSTATE (TRNBP);  Surgeon: Alexey Larson MD;  [facility];  Service: Urology    SPERMATOCELECTOMY  02/2018    right spermatocelectomy and multiple lipoma removal    US GUIDED PROSTATE BIOPSY  9/11/2020     Social History   Social History     Substance and Sexual Activity   Alcohol Use Yes    Alcohol/week: 10.0 standard drinks of alcohol    Types: 10 Cans of beer per week    Comment: Weekends     Social History     Substance and Sexual Activity   Drug Use Yes    Frequency: 2.0 times per week    Types: Marijuana    Comment: Weekends     Social History     Tobacco Use   Smoking Status Former    Current packs/day: 0.00    Average packs/day: 1 pack/day for 3.0 years (3.0 ttl pk-yrs)    Types: Cigarettes    Start date: 1970    Quit date: 1973    Years  since quittin.3   Smokeless Tobacco Never     Family History   Problem Relation Age of Onset    Uterine cancer Sister        Meds/Allergies       Current Outpatient Medications:     Bioflavonoid Products (STEPHANY-C PO)    famotidine (PEPCID) 40 MG tablet    abiraterone (ZYTIGA) 250 mg tablet    predniSONE 5 mg tablet    Current Facility-Administered Medications:     lactated ringers infusion, 125 mL/hr, Intravenous, Continuous    lidocaine (PF) (XYLOCAINE-MPF) 1 % injection 0.5 mL, 0.5 mL, Infiltration, Once PRN    Allergies   Allergen Reactions    Other      DUST    Tylenol [Acetaminophen]      RINGING IN EARS       Objective     There were no vitals taken for this visit.      PHYSICAL EXAM    Gen: NAD  Head: NCAT  CV: RRR  CHEST: Clear  ABD: soft, NT/ND  EXT: no edema      ASSESSMENT/PLAN:  This is a 69 y.o. year old male here for colonoscopy, and he is stable and optimized for his procedure.

## 2024-05-03 NOTE — ANESTHESIA POSTPROCEDURE EVALUATION
Post-Op Assessment Note    CV Status:  Stable    Pain management: adequate       Mental Status:  Sleepy   Hydration Status:  Euvolemic   PONV Controlled:  Controlled   Airway Patency:  Patent  Two or more mitigation strategies used for obstructive sleep apnea   Post Op Vitals Reviewed: Yes    No anethesia notable event occurred.    Staff: CRNA, Anesthesiologist               BP      Temp      Pulse     Resp      SpO2

## 2024-05-03 NOTE — ANESTHESIA PREPROCEDURE EVALUATION
Procedure:  COLONOSCOPY    Possible PO intake in the last hour. Gastric ultrasound shows small amount air/liquid. Patient denies significant intake and wants to proceed.    Relevant Problems   GI/HEPATIC   (+) Gastroesophageal reflux disease      /RENAL   (+) Prostate cancer (HCC)   (+) Stage 3 chronic kidney disease, unspecified whether stage 3a or 3b CKD (HCC)        Physical Exam    Airway    Mallampati score: III  TM Distance: >3 FB  Neck ROM: full     Dental   No notable dental hx     Cardiovascular  Rhythm: regular, Rate: normal, Cardiovascular exam normal    Pulmonary  Pulmonary exam normal Breath sounds clear to auscultation    Other Findings        Anesthesia Plan  ASA Score- 2     Anesthesia Type- IV sedation with anesthesia with ASA Monitors.         Additional Monitors:     Airway Plan:     Comment: Discussed risks/benefits, including medication reactions, awareness, aspiration, and serious/life threatening complications.    Plan to maintain native airway with IVGA, monitored with EtCO2.       Plan Factors-Exercise tolerance (METS): >4 METS.    Chart reviewed.    Patient summary reviewed.      Patient instructed to abstain from smoking on day of procedure. Patient did not smoke on day of surgery.            Induction- intravenous.    Postoperative Plan-     Informed Consent- Anesthetic plan and risks discussed with patient.  I personally reviewed this patient with the CRNA. Discussed and agreed on the Anesthesia Plan with the CRNA..

## 2024-10-18 ENCOUNTER — APPOINTMENT (OUTPATIENT)
Dept: LAB | Facility: HOSPITAL | Age: 69
End: 2024-10-18
Attending: UROLOGY
Payer: COMMERCIAL

## 2024-10-18 DIAGNOSIS — C61 PROSTATE CANCER (HCC): ICD-10-CM

## 2024-10-18 LAB — PSA SERPL-MCNC: 0.01 NG/ML (ref 0–4)

## 2024-10-18 PROCEDURE — 84153 ASSAY OF PSA TOTAL: CPT

## 2024-10-18 PROCEDURE — 36415 COLL VENOUS BLD VENIPUNCTURE: CPT

## 2024-10-22 ENCOUNTER — TELEPHONE (OUTPATIENT)
Age: 69
End: 2024-10-22

## 2024-10-22 DIAGNOSIS — C61 PROSTATE CANCER (HCC): Primary | ICD-10-CM

## 2024-10-22 NOTE — TELEPHONE ENCOUNTER
I personally called patient to review the results of his most recent PSA which came back at 0.012.  Reviewed possible implications of this.  Patient does have a history of prostatectomy with positive margin.  Reviewed the definition of biochemical recurrence at 0.2   And the patient is not currently meeting this criteria.  Discussed that patient could have benign microscopic prostate tissue leftover that is producing  minimal PSA.  discussed that option of PSMA PET scan/referral to radiation oncology I think would be premature at this point in time.  I think that we should trend the PSA going forward and patient is comfortable with a close interval PSA check at 4 to 6 weeks.  If PSA appears to be trending toward 0.2 at that point in time we will initiate referral to radiation oncology.  Answered all questions to patient's satisfaction he has no other concerns at this point in time PSA is in chart patient has a appointment scheduled with Dr. Larson in January we will plan to keep that as long as PSA remains low

## 2024-10-22 NOTE — TELEPHONE ENCOUNTER
Patient of Dr Larson at [facility]    Patient called to see if he had a follow up appointment. No appointment scheduled.  He had his psa done and would like to know the results. He stated he will make appointment if results are abnormal.     Patient can be reached at [phone]

## 2024-10-22 NOTE — TELEPHONE ENCOUNTER
PSA measured at 0.012.  Previously was measuring less than 0.01.  I think that patient should have follow-up appointment given that PSA is now minimally detectable.  This is not concerning for recurrence of prostate cancer at this point in time but may want to keep a closer eye on the PSA to ensure there is not an upward trend.

## 2024-11-18 ENCOUNTER — APPOINTMENT (OUTPATIENT)
Dept: LAB | Facility: HOSPITAL | Age: 69
End: 2024-11-18
Payer: COMMERCIAL

## 2024-11-18 DIAGNOSIS — C61 PROSTATE CANCER (HCC): ICD-10-CM

## 2024-11-18 LAB — PSA SERPL-MCNC: 0.01 NG/ML (ref 0–4)

## 2024-11-18 PROCEDURE — 36415 COLL VENOUS BLD VENIPUNCTURE: CPT

## 2024-11-18 PROCEDURE — 84153 ASSAY OF PSA TOTAL: CPT

## 2024-11-22 ENCOUNTER — RESULTS FOLLOW-UP (OUTPATIENT)
Dept: UROLOGY | Facility: CLINIC | Age: 69
End: 2024-11-22

## 2024-11-22 DIAGNOSIS — C61 PROSTATE CANCER (HCC): Primary | ICD-10-CM

## 2024-11-22 NOTE — TELEPHONE ENCOUNTER
Called and left a message for this pt relaying message from Marcos that his PSA is currently stable and to have another done prior to his upcoming appt with Dr Larson in January. If this pt calls back please relay these messages again. Thank you              ----- Message from Marcos Franklin PA-C sent at 11/22/2024 10:37 AM EST -----  Please call patient to inform him that his PSA is stable at 0.01.  Did he keep follow-up with Dr. Larson in January.  He can obtain an updated PSA a week or so before that appointment

## 2025-01-24 ENCOUNTER — APPOINTMENT (OUTPATIENT)
Dept: LAB | Facility: HOSPITAL | Age: 70
End: 2025-01-24
Payer: COMMERCIAL

## 2025-01-24 DIAGNOSIS — C61 PROSTATE CANCER (HCC): ICD-10-CM

## 2025-01-24 LAB — PSA SERPL-MCNC: 0.03 NG/ML (ref 0–4)

## 2025-01-24 PROCEDURE — 84153 ASSAY OF PSA TOTAL: CPT

## 2025-01-24 PROCEDURE — 36415 COLL VENOUS BLD VENIPUNCTURE: CPT

## 2025-01-27 ENCOUNTER — RESULTS FOLLOW-UP (OUTPATIENT)
Dept: UROLOGY | Facility: CLINIC | Age: 70
End: 2025-01-27

## 2025-01-28 ENCOUNTER — TELEPHONE (OUTPATIENT)
Dept: UROLOGY | Facility: AMBULATORY SURGERY CENTER | Age: 70
End: 2025-01-28

## 2025-01-28 ENCOUNTER — OFFICE VISIT (OUTPATIENT)
Dept: UROLOGY | Facility: AMBULATORY SURGERY CENTER | Age: 70
End: 2025-01-28
Payer: COMMERCIAL

## 2025-01-28 VITALS
OXYGEN SATURATION: 98 % | SYSTOLIC BLOOD PRESSURE: 124 MMHG | BODY MASS INDEX: 29.26 KG/M2 | HEIGHT: 72 IN | WEIGHT: 216 LBS | DIASTOLIC BLOOD PRESSURE: 80 MMHG | HEART RATE: 65 BPM

## 2025-01-28 DIAGNOSIS — C61 PROSTATE CANCER (HCC): Primary | ICD-10-CM

## 2025-01-28 PROCEDURE — 99214 OFFICE O/P EST MOD 30 MIN: CPT | Performed by: UROLOGY

## 2025-01-28 NOTE — PROGRESS NOTES
Name: Ronny Bueno      : 1955      MRN: [id number]  Encounter Provider: Alexey Larson MD  Encounter Date: 2025   Encounter department: [facility] [place]  :  Assessment & Plan  Prostate cancer (HCC)         Impression: Mario 7 prostate cancer status post robot-assisted laparoscopic prostatectomy (), PSA recurrence with questionable lymph node involvement, status post wide-field pelvic radiation with a single dose of Lupron, rising PSA    Plan: I recommend that he return in 3 months time with a repeat PSA.  The patient has also requested decipher testing be performed on his prostatectomy specimen from .  I do feel that this is reasonable to assess his risk of disease recurrence/metastasis with primary treatment.  If the PSA keeps rising the next referral will be back to Dr. Smith for medical oncology who previously saw the patient to consider resumption of Lupron and oral ADT.  The patient is amenable with this plan.    History of Present Illness   Ronny Bueno is a 69 y.o. male who presents for follow-up of his Fitzpatrick 7 prostate cancer.  In 2021 he underwent robot-assisted laparoscopic prostatectomy with bilateral pelvic lymph node dissection.  He had a PSA jesus of 0.2 which then deirdre to 0.9.  He then had a PSMA CT PET scan which showed activity in the pelvic sidewall concerning for lymph node disease.  He received a single 6-month Lupron injection.  I referred him to medical oncology and oral ADT was recommended but the patient refused.  His most recent PSA is noted to be 0.029.  This is up from 0.14 over the course of the last 2 months.  We discussed the significance of this PSA rise in the office today.    Review of Systems       Objective   /80 (Patient Position: Sitting, Cuff Size: Adult)   Pulse 65   Ht 6' (1.829 m)   Wt 98 kg (216 lb)   SpO2 98%   BMI 29.29 kg/m²     Physical Exam on examination he is in no acute distress.  Gait normal.   Affect normal    Results  Lab Results   Component Value Date    PSA 0.029 01/24/2025    PSA 0.014 11/18/2024    PSA 0.012 10/18/2024     Lab Results   Component Value Date    CALCIUM 9.6 04/03/2024    K 4.1 04/03/2024    CO2 30 04/03/2024     04/03/2024    BUN 18 04/03/2024    CREATININE 0.88 04/03/2024     Lab Results   Component Value Date    WBC 7.00 04/03/2024    HGB 15.3 04/03/2024    HCT 46.8 04/03/2024    MCV 89 04/03/2024     04/03/2024       Office Urine Dip  No results found for this or any previous visit (from the past hour).]

## 2025-01-28 NOTE — LETTER
2025     Karlo Dc MD  [address]  [place] [id number]    Patient: Ronny Bueno   YOB: 1955   Date of Visit: 2025       Dear Dr. Dc:    Thank you for referring Ronny Bueno to me for evaluation. Below are my notes for this consultation.    If you have questions, please do not hesitate to call me. I look forward to following your patient along with you.         Sincerely,        Alexey Larson MD        CC: MD Alexey Norton MD  2025 11:07 AM  Sign when Signing Visit  Name: Ronny Bueno      : 1955      MRN: [id number]  Encounter Provider: Alexey Larson MD  Encounter Date: 2025   Encounter department: [facility] [place]  :  Assessment & Plan  Impression: Mario 7 prostate cancer status post robot-assisted laparoscopic prostatectomy (), PSA recurrence with questionable lymph node involvement, status post wide-field pelvic radiation with a single dose of Lupron, rising PSA    Plan: I recommend that he return in 3 months time with a repeat PSA.  The patient has also requested decipher testing be performed on his prostatectomy specimen from .  I do feel that this is reasonable to assess his risk of disease recurrence/metastasis with primary treatment.  If the PSA keeps rising the next referral will be back to Dr. Smith for medical oncology who previously saw the patient to consider resumption of Lupron and oral ADT.  The patient is amenable with this plan.    History of Present Illness  Ronny Bueno is a 69 y.o. male who presents for follow-up of his Slater 7 prostate cancer.  In 2021 he underwent robot-assisted laparoscopic prostatectomy with bilateral pelvic lymph node dissection.  He had a PSA jesus of 0.2 which then deirdre to 0.9.  He then had a PSMA CT PET scan which showed activity in the pelvic sidewall concerning for lymph node disease.  He received a single 6-month Lupron  PCP please advise and send back to nurse triage pool. Patient called stated she is still having pain like she described on 1/7 during visit with PCP. Pain/ache is from her breast bone down to belly button. Same SOB as last week. SOB is not worse with activity. Patient is going to try albuterol inhaler now. No weakness or numbness. Hands are shaking on/off. No dizziness  Taking lexapro daily  Omeprazole did not help with the chest pain, does not want to continue with it. Patient states her hands are shaking because of this medication. injection.  I referred him to medical oncology and oral ADT was recommended but the patient refused.  His most recent PSA is noted to be 0.029.  This is up from 0.14 over the course of the last 2 months.  We discussed the significance of this PSA rise in the office today.    Review of Systems       Objective  /80 (Patient Position: Sitting, Cuff Size: Adult)   Pulse 65   Ht 6' (1.829 m)   Wt 98 kg (216 lb)   SpO2 98%   BMI 29.29 kg/m²     Physical Exam on examination he is in no acute distress.  Gait normal.  Affect normal    Results  Lab Results   Component Value Date    PSA 0.029 01/24/2025    PSA 0.014 11/18/2024    PSA 0.012 10/18/2024     Lab Results   Component Value Date    CALCIUM 9.6 04/03/2024    K 4.1 04/03/2024    CO2 30 04/03/2024     04/03/2024    BUN 18 04/03/2024    CREATININE 0.88 04/03/2024     Lab Results   Component Value Date    WBC 7.00 04/03/2024    HGB 15.3 04/03/2024    HCT 46.8 04/03/2024    MCV 89 04/03/2024     04/03/2024       Office Urine Dip  No results found for this or any previous visit (from the past hour).]

## 2025-01-28 NOTE — TELEPHONE ENCOUNTER
----- Message from Alexey Larson MD sent at 1/28/2025 10:26 AM EST -----  [id number]    Can you send this path to [facility] for genetic testing?    The path is from 2021.  The number above is the reference for pathology.      Thank you    FT

## 2025-01-29 NOTE — TELEPHONE ENCOUNTER
Lida from [facility] calling to inform that they received the wrong form for the Decipher test    They need a new order form for radical prostatectomy

## 2025-02-14 ENCOUNTER — LAB REQUISITION (OUTPATIENT)
Dept: LAB | Facility: HOSPITAL | Age: 70
End: 2025-02-14

## 2025-02-14 DIAGNOSIS — C61 MALIGNANT NEOPLASM OF PROSTATE (HCC): ICD-10-CM

## 2025-02-14 LAB — SCAN RESULT: NORMAL

## 2025-04-15 ENCOUNTER — APPOINTMENT (OUTPATIENT)
Dept: LAB | Facility: HOSPITAL | Age: 70
End: 2025-04-15
Payer: COMMERCIAL

## 2025-04-15 ENCOUNTER — NURSE TRIAGE (OUTPATIENT)
Age: 70
End: 2025-04-15

## 2025-04-15 DIAGNOSIS — R97.20 ELEVATED PSA: Primary | ICD-10-CM

## 2025-04-15 LAB — PSA SERPL-MCNC: 0.04 NG/ML (ref 0–4)

## 2025-04-15 PROCEDURE — 84153 ASSAY OF PSA TOTAL: CPT

## 2025-04-15 PROCEDURE — 36415 COLL VENOUS BLD VENIPUNCTURE: CPT

## 2025-04-15 NOTE — TELEPHONE ENCOUNTER
"Answer Assessment - Initial Assessment Questions  1. REASON FOR CALL: \"What is the main reason for your call?\" or \"How can I best help you?\"      [facility] called in stating patient is there for PSA. Placed PSA lab order for patient.    Protocols used: Information Only Call - No Triage-Adult-[place]    "

## 2025-04-17 ENCOUNTER — OFFICE VISIT (OUTPATIENT)
Dept: UROLOGY | Facility: AMBULATORY SURGERY CENTER | Age: 70
End: 2025-04-17
Payer: COMMERCIAL

## 2025-04-17 VITALS
OXYGEN SATURATION: 98 % | BODY MASS INDEX: 29.26 KG/M2 | SYSTOLIC BLOOD PRESSURE: 122 MMHG | WEIGHT: 216 LBS | HEIGHT: 72 IN | HEART RATE: 81 BPM | DIASTOLIC BLOOD PRESSURE: 82 MMHG

## 2025-04-17 DIAGNOSIS — C61 PROSTATE CANCER (HCC): Primary | ICD-10-CM

## 2025-04-17 DIAGNOSIS — N18.30 STAGE 3 CHRONIC KIDNEY DISEASE, UNSPECIFIED WHETHER STAGE 3A OR 3B CKD (HCC): ICD-10-CM

## 2025-04-17 PROCEDURE — 99214 OFFICE O/P EST MOD 30 MIN: CPT | Performed by: UROLOGY

## 2025-04-17 NOTE — ASSESSMENT & PLAN NOTE
Lab Results   Component Value Date    EGFR 87 04/03/2024    EGFR 76 07/21/2023    EGFR 59 08/18/2022    CREATININE 0.88 04/03/2024    CREATININE 1.00 07/21/2023    CREATININE 1.24 08/18/2022

## 2025-04-17 NOTE — PROGRESS NOTES
Name: Ronny Bueno      : 1955      MRN: [id number]  Encounter Provider: Alexey Larson MD  Encounter Date: 2025   Encounter department: [facility] [place]  :  Assessment & Plan  Prostate cancer (HCC)    Orders:  •  PSA Total, Diagnostic; Future    Stage 3 chronic kidney disease, unspecified whether stage 3a or 3b CKD (HCC)  Lab Results   Component Value Date    EGFR 87 2024    EGFR 76 2023    EGFR 59 2022    CREATININE 0.88 2024    CREATININE 1.00 2023    CREATININE 1.24 2022            Impression: Sparks 4+3 = 7/10 prostate cancer, status post robot-assisted laparoscopic prostatectomy, status post adjuvant radiation therapy    Plan: Today we focused on his PSA of 0.040.  We also spent significant time reviewing his decipher genomic testing.  See results per below.  We discussed options including continued surveillance versus a more aggressive approach with Lupron and oral ADT based on his decipher genomic testing.  At this time he is not interested in androgen deprivation therapy.  He is requesting observation with a repeat PSA in 6 months time.  I feel that this is a very reasonable approach.  If the PSA were to continue to rise the neck step would be to consider repeating a PSMA CT PET scan and referring the patient back to Dr. Smith to consider oral ADT and Lupron.    30 minutes total time spent with the patient reviewing data and discussing decipher testing results.    History of Present Illness   Ronny Bueno is a 70 y.o. male who presents in follow-up today for his Sparks 4+3 = 7/10 prostate cancer.  Ronny underwent a robot-assisted laparoscopic prostatectomy with bilateral pelvic lymph node dissection performed in 2021.  All surgical margins were negative.  There was no evidence of seminal vesicle invasion but left extracapsular extension was noted.  He began to have a rising PSA post prostatectomy and ultimately received  wide-field pelvic radiation.  This was completed after his PSA jesus of 0.2 deirdre to 0.9.  At that time a PSMA CT PET scan showed activity in the pelvic sidewall.  He received a single 6-month dose of Lupron followed by wide-field pelvic radiation.  His most recent PSA is 0.040.  Prior to this his PSA was 0.029.  He returns in follow-up today to review his decipher genomic testing to further risk stratify him.  His decipher score was 0.97 which is on the high end of the range.  We discussed that this correlates with a 8% risk of metastasis in 5 years and a 25% risk of metastasis at 10 years.  In addition there is a 40% 15-year disease specific mortality associated with his genomic testing.  We discussed his test results at length in the office today.    Review of Systems       Objective   /82 (BP Location: Left arm, Patient Position: Sitting, Cuff Size: Standard)   Pulse 81   Ht 6' (1.829 m)   Wt 98 kg (216 lb)   SpO2 98%   BMI 29.29 kg/m²     Physical Exam on examination he is in no acute distress.  Gait normal.  Affect normal    Results   Lab Results   Component Value Date    PSA 0.040 04/15/2025    PSA 0.029 01/24/2025    PSA 0.014 11/18/2024     Lab Results   Component Value Date    CALCIUM 9.6 04/03/2024    K 4.1 04/03/2024    CO2 30 04/03/2024     04/03/2024    BUN 18 04/03/2024    CREATININE 0.88 04/03/2024     Lab Results   Component Value Date    WBC 7.00 04/03/2024    HGB 15.3 04/03/2024    HCT 46.8 04/03/2024    MCV 89 04/03/2024     04/03/2024       Office Urine Dip  No results found for this or any previous visit (from the past hour).

## 2025-05-07 ENCOUNTER — TELEPHONE (OUTPATIENT)
Dept: UROLOGY | Facility: CLINIC | Age: 70
End: 2025-05-07

## 2025-05-07 NOTE — TELEPHONE ENCOUNTER
When appt made for 11/10- pt requested a phone call to remind him.     Will postpone message until 11/1 and give a call.

## 2025-06-03 ENCOUNTER — TELEPHONE (OUTPATIENT)
Dept: SURGERY | Facility: CLINIC | Age: 70
End: 2025-06-03

## 2025-06-03 ENCOUNTER — HOSPITAL ENCOUNTER (EMERGENCY)
Facility: HOSPITAL | Age: 70
Discharge: HOME | End: 2025-06-03
Attending: EMERGENCY MEDICINE
Payer: COMMERCIAL

## 2025-06-03 ENCOUNTER — APPOINTMENT (EMERGENCY)
Dept: CT IMAGING | Facility: HOSPITAL | Age: 70
End: 2025-06-03
Payer: COMMERCIAL

## 2025-06-03 VITALS
SYSTOLIC BLOOD PRESSURE: 174 MMHG | TEMPERATURE: 98.2 F | OXYGEN SATURATION: 99 % | WEIGHT: 216 LBS | RESPIRATION RATE: 18 BRPM | BODY MASS INDEX: 29.26 KG/M2 | DIASTOLIC BLOOD PRESSURE: 86 MMHG | HEIGHT: 72 IN | HEART RATE: 58 BPM

## 2025-06-03 DIAGNOSIS — R10.31 RIGHT LOWER QUADRANT ABDOMINAL PAIN: Primary | ICD-10-CM

## 2025-06-03 LAB
ALBUMIN SERPL BCG-MCNC: 4.4 G/DL (ref 3.5–5)
ALP SERPL-CCNC: 85 U/L (ref 34–104)
ALT SERPL W P-5'-P-CCNC: 19 U/L (ref 7–52)
ANION GAP SERPL CALCULATED.3IONS-SCNC: 9 MMOL/L (ref 4–13)
AST SERPL W P-5'-P-CCNC: 20 U/L (ref 13–39)
BASOPHILS # BLD AUTO: 0.05 THOUSANDS/ÂΜL (ref 0–0.1)
BASOPHILS NFR BLD AUTO: 1 % (ref 0–1)
BILIRUB SERPL-MCNC: 1.08 MG/DL (ref 0.2–1)
BILIRUB UR QL STRIP: NEGATIVE
BUN SERPL-MCNC: 12 MG/DL (ref 5–25)
CALCIUM SERPL-MCNC: 9.1 MG/DL (ref 8.4–10.2)
CHLORIDE SERPL-SCNC: 103 MMOL/L (ref 96–108)
CLARITY UR: CLEAR
CO2 SERPL-SCNC: 26 MMOL/L (ref 21–32)
COLOR UR: YELLOW
CREAT SERPL-MCNC: 0.88 MG/DL (ref 0.6–1.3)
EOSINOPHIL # BLD AUTO: 0.09 THOUSAND/ÂΜL (ref 0–0.61)
EOSINOPHIL NFR BLD AUTO: 1 % (ref 0–6)
ERYTHROCYTE [DISTWIDTH] IN BLOOD BY AUTOMATED COUNT: 13.2 % (ref 11.6–15.1)
GFR SERPL CREATININE-BSD FRML MDRD: 87 ML/MIN/1.73SQ M
GLUCOSE SERPL-MCNC: 89 MG/DL (ref 65–140)
GLUCOSE UR STRIP-MCNC: NEGATIVE MG/DL
HCT VFR BLD AUTO: 42.1 % (ref 36.5–49.3)
HGB BLD-MCNC: 13.9 G/DL (ref 12–17)
HGB UR QL STRIP.AUTO: NEGATIVE
IMM GRANULOCYTES # BLD AUTO: 0.02 THOUSAND/UL (ref 0–0.2)
IMM GRANULOCYTES NFR BLD AUTO: 0 % (ref 0–2)
KETONES UR STRIP-MCNC: NEGATIVE MG/DL
LEUKOCYTE ESTERASE UR QL STRIP: NEGATIVE
LIPASE SERPL-CCNC: 11 U/L (ref 11–82)
LYMPHOCYTES # BLD AUTO: 1.02 THOUSANDS/ÂΜL (ref 0.6–4.47)
LYMPHOCYTES NFR BLD AUTO: 13 % (ref 14–44)
MCH RBC QN AUTO: 29.6 PG (ref 26.8–34.3)
MCHC RBC AUTO-ENTMCNC: 33 G/DL (ref 31.4–37.4)
MCV RBC AUTO: 90 FL (ref 82–98)
MONOCYTES # BLD AUTO: 0.81 THOUSAND/ÂΜL (ref 0.17–1.22)
MONOCYTES NFR BLD AUTO: 11 % (ref 4–12)
NEUTROPHILS # BLD AUTO: 5.64 THOUSANDS/ÂΜL (ref 1.85–7.62)
NEUTS SEG NFR BLD AUTO: 74 % (ref 43–75)
NITRITE UR QL STRIP: NEGATIVE
NRBC BLD AUTO-RTO: 0 /100 WBCS
PH UR STRIP.AUTO: 7 [PH]
PLATELET # BLD AUTO: 205 THOUSANDS/UL (ref 149–390)
PMV BLD AUTO: 10.4 FL (ref 8.9–12.7)
POTASSIUM SERPL-SCNC: 3.9 MMOL/L (ref 3.5–5.3)
PROT SERPL-MCNC: 7.1 G/DL (ref 6.4–8.4)
PROT UR STRIP-MCNC: NEGATIVE MG/DL
RBC # BLD AUTO: 4.7 MILLION/UL (ref 3.88–5.62)
SODIUM SERPL-SCNC: 138 MMOL/L (ref 135–147)
SP GR UR STRIP.AUTO: 1.01 (ref 1–1.03)
UROBILINOGEN UR QL STRIP.AUTO: 0.2 E.U./DL
WBC # BLD AUTO: 7.63 THOUSAND/UL (ref 4.31–10.16)

## 2025-06-03 PROCEDURE — 81003 URINALYSIS AUTO W/O SCOPE: CPT

## 2025-06-03 PROCEDURE — 85025 COMPLETE CBC W/AUTO DIFF WBC: CPT

## 2025-06-03 PROCEDURE — 80053 COMPREHEN METABOLIC PANEL: CPT

## 2025-06-03 PROCEDURE — 99285 EMERGENCY DEPT VISIT HI MDM: CPT | Performed by: EMERGENCY MEDICINE

## 2025-06-03 PROCEDURE — 99284 EMERGENCY DEPT VISIT MOD MDM: CPT

## 2025-06-03 PROCEDURE — 83690 ASSAY OF LIPASE: CPT

## 2025-06-03 PROCEDURE — 36415 COLL VENOUS BLD VENIPUNCTURE: CPT

## 2025-06-03 PROCEDURE — 74177 CT ABD & PELVIS W/CONTRAST: CPT

## 2025-06-03 RX ADMIN — IOHEXOL 90 ML: 350 INJECTION, SOLUTION INTRAVENOUS at 14:53

## 2025-06-03 NOTE — DISCHARGE INSTRUCTIONS
CT abdomen pelvis with contrast  Result Date: 6/3/2025  Impression: No acute findings in the abdomen or pelvis. Workstation performed: [id number]

## 2025-06-03 NOTE — ED PROVIDER NOTES
Time reflects when diagnosis was documented in both MDM as applicable and the Disposition within this note       Time User Action Codes Description Comment    6/3/2025  4:06 PM Marvin Kothari Add [R10.31] Right lower quadrant abdominal pain           ED Disposition       ED Disposition   Discharge    Condition   Stable    Date/Time   Tue Kevon 3, 2025  4:06 PM    Comment   Ronny Bueno discharge to home/self care.                   Assessment & Plan       Medical Decision Making  72-year-old M presenting with RLQ pain.  SH of right hernia repair.  No Espinoza sign, vital signs stable.  No other symptoms.  History of alcohol use on Sunday.  Normal BM, with the last BM this morning and able to pass gas.  Differential diagnosis includes constipation, partial SBO, enteritis, appendicitis.    CBC/CMP notable for mild hyperbilirubinemia, 1.08.  Lipase WNL.  CT scan abdomen/pelvis -bilateral perinephric renal cysts.    Stable for discharge, patient advised to follow-up with his PCP.    Amount and/or Complexity of Data Reviewed  Labs: ordered.  Radiology: ordered.    Risk  Prescription drug management.             Medications   iohexol (OMNIPAQUE) 350 MG/ML injection (SINGLE-DOSE) 100 mL (90 mL Intravenous Given 6/3/25 1453)       ED Risk Strat Scores                    (ISAR) Identification of Seniors at Risk  Before the illness or injury that brought you to the Emergency, did you need someone to help you on a regular basis?: 0  In the last 24 hours, have you needed more help than usual?: 0  Have you been hospitalized for one or more nights during the past 6 months?: 0  In general, do you see well?: 1  In general, do you have serious problems with your memory?: 0  Do you take more than three different medications every day?: 0  ISAR Score: 1            SBIRT 22yo+      Flowsheet Row Most Recent Value   KEYA: How many times in the past year have you...    Used an illegal drug or used a prescription medication for non-medical  reasons? Never Filed at: 06/03/2025 1241                            History of Present Illness       Chief Complaint   Patient presents with    Abdominal Pain     RLQ abd pain that started yesterday morning, denies N/V/D; pt states the pain feels like 'gas pain' -- tried GasX and famotidine w/o relief        Past Medical History[1]   Past Surgical History[2]   Family History[3]   Social History[4]   E-Cigarette/Vaping    E-Cigarette Use Never User       E-Cigarette/Vaping Substances    Nicotine No     THC No     CBD No     Flavoring No       I have reviewed and agree with the history as documented.     72-year-old M with PMH of prostatectomy presenting with RLQ pain.  Patient states that he drank approximately 10-12 drinks, alcohol mixed with water, on Sunday.  Pain does not radiate, and has been present since yesterday morning.  Patient states that he had a normal BM this morning, and is passing gas.  No other associated symptoms.  Patient denies headache, lightheadedness, change in vision, shortness of breath, chest pain, dysuria, hematuria, change in BM, melena, hematochezia      Abdominal Pain      Review of Systems   Gastrointestinal:  Positive for abdominal pain.   All other systems reviewed and are negative.          Objective       ED Triage Vitals [06/03/25 1238]   Temperature Pulse Blood Pressure Respirations SpO2 Patient Position - Orthostatic VS   98.2 °F (36.8 °C) 61 157/76 18 99 % Lying      Temp Source Heart Rate Source BP Location FiO2 (%) Pain Score    Oral Monitor Left arm -- --      Vitals      Date and Time Temp Pulse SpO2 Resp BP Pain Score FACES Pain Rating User   06/03/25 1506 -- 58 99 % 18 174/86 -- -- CF   06/03/25 1238 98.2 °F (36.8 °C) 61 99 % 18 157/76 -- -- KLB            Physical Exam  Constitutional:       General: He is not in acute distress.     Appearance: Normal appearance.   HENT:      Head: Normocephalic.      Nose: No congestion.      Mouth/Throat:      Mouth: Mucous membranes  are moist.     Eyes:      General: No scleral icterus.        Right eye: No discharge.         Left eye: No discharge.      Extraocular Movements: Extraocular movements intact.       Cardiovascular:      Rate and Rhythm: Normal rate and regular rhythm.      Pulses: Normal pulses.      Heart sounds: Normal heart sounds.   Pulmonary:      Effort: Pulmonary effort is normal. No respiratory distress.      Breath sounds: No wheezing or rales.   Abdominal:      General: Abdomen is flat. Bowel sounds are normal. There is no distension.      Tenderness: There is abdominal tenderness in the right lower quadrant. There is no right CVA tenderness, left CVA tenderness or guarding. Negative signs include Espinoza's sign.      Hernia: No hernia is present.     Musculoskeletal:         General: No tenderness or deformity.      Cervical back: Normal range of motion. No rigidity.     Skin:     General: Skin is warm.      Capillary Refill: Capillary refill takes less than 2 seconds.     Neurological:      General: No focal deficit present.      Mental Status: He is alert.         Results Reviewed       Procedure Component Value Units Date/Time    UA w Reflex to Microscopic w Reflex to Culture [[id number]]  (Normal) Collected: 06/03/25 1515    Lab Status: Final result Specimen: Urine, Clean Catch Updated: 06/03/25 1520     Color, UA Yellow     Clarity, UA Clear     Specific Gravity, UA 1.010     pH, UA 7.0     Leukocytes, UA Negative     Nitrite, UA Negative     Protein, UA Negative mg/dl      Glucose, UA Negative mg/dl      Ketones, UA Negative mg/dl      Urobilinogen, UA 0.2 E.U./dl      Bilirubin, UA Negative     Occult Blood, UA Negative    Comprehensive metabolic panel [[id number]]  (Abnormal) Collected: 06/03/25 1354    Lab Status: Final result Specimen: Blood from Arm, Left Updated: 06/03/25 1420     Sodium 138 mmol/L      Potassium 3.9 mmol/L      Chloride 103 mmol/L      CO2 26 mmol/L      ANION GAP 9 mmol/L      BUN 12 mg/dL       Creatinine 0.88 mg/dL      Glucose 89 mg/dL      Calcium 9.1 mg/dL      AST 20 U/L      ALT 19 U/L      Alkaline Phosphatase 85 U/L      Total Protein 7.1 g/dL      Albumin 4.4 g/dL      Total Bilirubin 1.08 mg/dL      eGFR 87 ml/min/1.73sq m     Narrative:      National Kidney Disease Foundation guidelines for Chronic Kidney Disease (CKD):     Stage 1 with normal or high GFR (GFR > 90 mL/min/1.73 square meters)    Stage 2 Mild CKD (GFR = 60-89 mL/min/1.73 square meters)    Stage 3A Moderate CKD (GFR = 45-59 mL/min/1.73 square meters)    Stage 3B Moderate CKD (GFR = 30-44 mL/min/1.73 square meters)    Stage 4 Severe CKD (GFR = 15-29 mL/min/1.73 square meters)    Stage 5 End Stage CKD (GFR <15 mL/min/1.73 square meters)  Note: GFR calculation is accurate only with a steady state creatinine    Lipase [[id number]]  (Normal) Collected: 06/03/25 1354    Lab Status: Final result Specimen: Blood from Arm, Left Updated: 06/03/25 1420     Lipase 11 u/L     CBC and differential [[id number]]  (Abnormal) Collected: 06/03/25 1354    Lab Status: Final result Specimen: Blood from Arm, Left Updated: 06/03/25 1359     WBC 7.63 Thousand/uL      RBC 4.70 Million/uL      Hemoglobin 13.9 g/dL      Hematocrit 42.1 %      MCV 90 fL      MCH 29.6 pg      MCHC 33.0 g/dL      RDW 13.2 %      MPV 10.4 fL      Platelets 205 Thousands/uL      nRBC 0 /100 WBCs      Segmented % 74 %      Immature Grans % 0 %      Lymphocytes % 13 %      Monocytes % 11 %      Eosinophils Relative 1 %      Basophils Relative 1 %      Absolute Neutrophils 5.64 Thousands/µL      Absolute Immature Grans 0.02 Thousand/uL      Absolute Lymphocytes 1.02 Thousands/µL      Absolute Monocytes 0.81 Thousand/µL      Eosinophils Absolute 0.09 Thousand/µL      Basophils Absolute 0.05 Thousands/µL             CT abdomen pelvis with contrast   Final Interpretation by Chris Bolden MD (06/03 1603)      No acute findings in the abdomen or pelvis.         Workstation performed:  [id number]             Procedures    ED Medication and Procedure Management   Prior to Admission Medications   Prescriptions Last Dose Informant Patient Reported? Taking?   Bioflavonoid Products (STEPHANY-C PO)  Self Yes No   Sig: Take by mouth daily as needed   abiraterone (ZYTIGA) 250 mg tablet  Self No No   Sig: Take 4 tablets (1,000 mg total) by mouth daily   Patient not taking: Reported on 9/22/2022   famotidine (PEPCID) 40 MG tablet  Self Yes No   Sig: Take 40 mg by mouth daily   predniSONE 5 mg tablet  Self No No   Sig: Take 1 tablet (5 mg total) by mouth 2 (two) times a day with meals   Patient not taking: Reported on 9/22/2022      Facility-Administered Medications: None     Patient's Medications   Discharge Prescriptions    No medications on file     No discharge procedures on file.  ED SEPSIS DOCUMENTATION   Time reflects when diagnosis was documented in both MDM as applicable and the Disposition within this note       Time User Action Codes Description Comment    6/3/2025  4:06 PM Marvin Kothari Add [R10.31] Right lower quadrant abdominal pain                      [1]   Past Medical History:  Diagnosis Date    Anal fissure     Basal cell carcinoma (BCC)     Basal cell carcinoma (BCC) of face     Cancer (HCC)     Colon polyp     Elevated PSA     GERD (gastroesophageal reflux disease)     Prostate cancer (HCC)    [2]   Past Surgical History:  Procedure Laterality Date    COLONOSCOPY  05/2020    [facility]     EGD      HERNIA REPAIR      inguinal    VT EXC B9 LES MRGN XCP SK TG F/E/E/N/L/M 0.6-1.0CM Right 4/6/2021    Procedure: FACIAL LESION EXCISION WITH FROZEN SECTION AND MULTI LAYER CLOSURE.;  Surgeon: Robert Bloch, MD;  Location: [facility];  Service: General    VT LAPS SURG [id number] RPBIC RAD W/NRV SPARING ROBOT N/A 1/20/2021    Procedure: PROSTATECTOMY RADICAL LAPAROSCOPIC  W ROBOTICS;  Surgeon: Alexey Larson MD;  Location: [facility];  Service: Urology    VT PROSTATE NEEDLE BIOPSY ANY  APPROACH N/A 2020    Procedure: TRANSRECTAL NEEDLE BIOPSY PROSTATE (TRNBP);  Surgeon: Alexey Larson MD;  Location: [facility];  Service: Urology    SPERMATOCELECTOMY  2018    right spermatocelectomy and multiple lipoma removal    US GUIDED PROSTATE BIOPSY  2020   [3]   Family History  Problem Relation Name Age of Onset    Uterine cancer Sister Rosibel    [4]   Social History  Tobacco Use    Smoking status: Former     Current packs/day: 0.00     Average packs/day: 1 pack/day for 3.0 years (3.0 ttl pk-yrs)     Types: Cigarettes     Start date:      Quit date:      Years since quittin.4    Smokeless tobacco: Never   Vaping Use    Vaping status: Never Used   Substance Use Topics    Alcohol use: Yes     Alcohol/week: 10.0 standard drinks of alcohol     Types: 10 Cans of beer per week     Comment: Weekends    Drug use: Yes     Frequency: 2.0 times per week     Types: Marijuana     Comment: Weekends        Lobo Ayers MD  25 [id number]

## 2025-06-03 NOTE — TELEPHONE ENCOUNTER
"REASON FOR CONVERSATION: Abdominal Pain    SYMPTOMS: \"Pain on right side\"    OTHER HEALTH INFORMATION: n/a    PROTOCOL DISPOSITION: Information or Advice Only Call    CARE ADVICE PROVIDED: Patient states he is going to the ER around noon to have testing done to find out what is going on. Symptoms started on Sunday after he had a few drinks. Moving bowels and urinating. No nausea or vomiting at this time.Somewhat anxious about what it could be. Asked that Dr. Bloch be made aware. Has seen him in the past for other medical issues    PRACTICE FOLLOW-UP: Not at this time        "

## 2025-06-03 NOTE — ED ATTENDING ATTESTATION
6/3/2025  I, Marvin Kothari MD, saw and evaluated the patient. I have discussed the patient with the resident/non-physician practitioner and agree with the resident's/non-physician practitioner's findings, Plan of Care, and MDM as documented in the resident's/non-physician practitioner's note, except where noted. All available labs and Radiology studies were reviewed.  I was present for key portions of any procedure(s) performed by the resident/non-physician practitioner and I was immediately available to provide assistance.       At this point I agree with the current assessment done in the Emergency Department.  I have conducted an independent evaluation of this patient a history and physical is as follows:    70-year-old male presents to the emergency department for evaluation of abdominal pain.  Patient reports acute onset pain starting yesterday morning.  He describes the pain as sharp.  No fever, chills, nausea, vomiting or diarrhea.  He has not been taking any medications to treat his symptoms.    A 10 point review of systems was conducted and negative except for as stated above in the HPI    Physical exam:  General: awake, alert, no acute distress  Head: normocephalic, atraumatic  Eyes: no scleral icterus  Ears: external ears normal, hearing grossly intact  Nose: external exam grossly normal  Neck: symmetric, No JVD noted, trachea midline  Pulmonary: no respiratory distress, no tachypnea noted  Cardiovascular: appears well perfused  Abdomen: no distention noted, tenderness right lower quadrant, no guarding, no rebound  Musculoskeletal: no deformities noted, tone normal  Skin: no rash noted  Neuro: grossly non-focal  Psych: mood and affect appropriate        ED Course  ED Course as of 06/06/25 1932 Tue Jun 03, 2025   1605 CT abdomen pelvis with contrast  IMPRESSION:     No acute findings in the abdomen or pelvis.           Critical Care Time  Procedures

## 2025-07-07 ENCOUNTER — HOSPITAL ENCOUNTER (EMERGENCY)
Facility: HOSPITAL | Age: 70
Discharge: LEFT BEFORE DISCHARGE | End: 2025-07-07
Attending: EMERGENCY MEDICINE
Payer: COMMERCIAL

## 2025-07-07 ENCOUNTER — APPOINTMENT (EMERGENCY)
Dept: CT IMAGING | Facility: HOSPITAL | Age: 70
End: 2025-07-07
Payer: COMMERCIAL

## 2025-07-07 VITALS
SYSTOLIC BLOOD PRESSURE: 156 MMHG | OXYGEN SATURATION: 99 % | HEIGHT: 71 IN | BODY MASS INDEX: 28.7 KG/M2 | TEMPERATURE: 97.7 F | DIASTOLIC BLOOD PRESSURE: 96 MMHG | RESPIRATION RATE: 16 BRPM | HEART RATE: 62 BPM | WEIGHT: 205 LBS

## 2025-07-07 DIAGNOSIS — S00.85XA FOREIGN BODY IN SKIN OF FACE: Primary | ICD-10-CM

## 2025-07-07 LAB
ANION GAP SERPL CALCULATED.3IONS-SCNC: 9 MMOL/L (ref 4–13)
BASOPHILS # BLD AUTO: 0.04 THOUSANDS/ÂΜL (ref 0–0.1)
BASOPHILS NFR BLD AUTO: 1 % (ref 0–1)
BUN SERPL-MCNC: 17 MG/DL (ref 5–25)
CALCIUM SERPL-MCNC: 9.3 MG/DL (ref 8.4–10.2)
CHLORIDE SERPL-SCNC: 105 MMOL/L (ref 96–108)
CO2 SERPL-SCNC: 23 MMOL/L (ref 21–32)
CREAT SERPL-MCNC: 0.87 MG/DL (ref 0.6–1.3)
EOSINOPHIL # BLD AUTO: 0.09 THOUSAND/ÂΜL (ref 0–0.61)
EOSINOPHIL NFR BLD AUTO: 1 % (ref 0–6)
ERYTHROCYTE [DISTWIDTH] IN BLOOD BY AUTOMATED COUNT: 13.2 % (ref 11.6–15.1)
GFR SERPL CREATININE-BSD FRML MDRD: 87 ML/MIN/1.73SQ M
GLUCOSE SERPL-MCNC: 111 MG/DL (ref 65–140)
HCT VFR BLD AUTO: 41.8 % (ref 36.5–49.3)
HGB BLD-MCNC: 14 G/DL (ref 12–17)
IMM GRANULOCYTES # BLD AUTO: 0.02 THOUSAND/UL (ref 0–0.2)
IMM GRANULOCYTES NFR BLD AUTO: 0 % (ref 0–2)
LYMPHOCYTES # BLD AUTO: 1.05 THOUSANDS/ÂΜL (ref 0.6–4.47)
LYMPHOCYTES NFR BLD AUTO: 13 % (ref 14–44)
MCH RBC QN AUTO: 29.5 PG (ref 26.8–34.3)
MCHC RBC AUTO-ENTMCNC: 33.5 G/DL (ref 31.4–37.4)
MCV RBC AUTO: 88 FL (ref 82–98)
MONOCYTES # BLD AUTO: 0.81 THOUSAND/ÂΜL (ref 0.17–1.22)
MONOCYTES NFR BLD AUTO: 10 % (ref 4–12)
NEUTROPHILS # BLD AUTO: 6.08 THOUSANDS/ÂΜL (ref 1.85–7.62)
NEUTS SEG NFR BLD AUTO: 75 % (ref 43–75)
NRBC BLD AUTO-RTO: 0 /100 WBCS
PLATELET # BLD AUTO: 192 THOUSANDS/UL (ref 149–390)
PMV BLD AUTO: 10.1 FL (ref 8.9–12.7)
POTASSIUM SERPL-SCNC: 3.9 MMOL/L (ref 3.5–5.3)
RBC # BLD AUTO: 4.75 MILLION/UL (ref 3.88–5.62)
SODIUM SERPL-SCNC: 137 MMOL/L (ref 135–147)
WBC # BLD AUTO: 8.09 THOUSAND/UL (ref 4.31–10.16)

## 2025-07-07 PROCEDURE — 96365 THER/PROPH/DIAG IV INF INIT: CPT

## 2025-07-07 PROCEDURE — 70487 CT MAXILLOFACIAL W/DYE: CPT

## 2025-07-07 PROCEDURE — 85025 COMPLETE CBC W/AUTO DIFF WBC: CPT | Performed by: EMERGENCY MEDICINE

## 2025-07-07 PROCEDURE — 96367 TX/PROPH/DG ADDL SEQ IV INF: CPT

## 2025-07-07 PROCEDURE — 99285 EMERGENCY DEPT VISIT HI MDM: CPT | Performed by: EMERGENCY MEDICINE

## 2025-07-07 PROCEDURE — 80048 BASIC METABOLIC PNL TOTAL CA: CPT | Performed by: EMERGENCY MEDICINE

## 2025-07-07 PROCEDURE — 36415 COLL VENOUS BLD VENIPUNCTURE: CPT | Performed by: EMERGENCY MEDICINE

## 2025-07-07 PROCEDURE — 96366 THER/PROPH/DIAG IV INF ADDON: CPT

## 2025-07-07 PROCEDURE — 99204 OFFICE O/P NEW MOD 45 MIN: CPT | Performed by: PHYSICIAN ASSISTANT

## 2025-07-07 PROCEDURE — 99284 EMERGENCY DEPT VISIT MOD MDM: CPT

## 2025-07-07 RX ORDER — CEFEPIME HYDROCHLORIDE 2 G/50ML
2000 INJECTION, SOLUTION INTRAVENOUS ONCE
Status: COMPLETED | OUTPATIENT
Start: 2025-07-07 | End: 2025-07-07

## 2025-07-07 RX ORDER — SULFAMETHOXAZOLE AND TRIMETHOPRIM 800; 160 MG/1; MG/1
1 TABLET ORAL 2 TIMES DAILY
Qty: 14 TABLET | Refills: 0 | Status: SHIPPED | OUTPATIENT
Start: 2025-07-07 | End: 2025-07-14

## 2025-07-07 RX ORDER — CEPHALEXIN 500 MG/1
500 CAPSULE ORAL EVERY 6 HOURS SCHEDULED
Qty: 28 CAPSULE | Refills: 0 | Status: SHIPPED | OUTPATIENT
Start: 2025-07-07 | End: 2025-07-14

## 2025-07-07 RX ADMIN — IOHEXOL 75 ML: 350 INJECTION, SOLUTION INTRAVENOUS at 12:39

## 2025-07-07 RX ADMIN — CEFEPIME HYDROCHLORIDE 2000 MG: 2 INJECTION, SOLUTION INTRAVENOUS at 15:28

## 2025-07-07 RX ADMIN — VANCOMYCIN HYDROCHLORIDE 1500 MG: 750 INJECTION, POWDER, LYOPHILIZED, FOR SOLUTION INTRAVENOUS at 15:47
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
## 2025-07-08 ENCOUNTER — HOSPITAL ENCOUNTER (OUTPATIENT)
Facility: HOSPITAL | Age: 70
Setting detail: OUTPATIENT SURGERY
Discharge: HOME | End: 2025-07-08
Attending: SURGERY | Admitting: SURGERY
Payer: COMMERCIAL

## 2025-07-08 ENCOUNTER — ANESTHESIA (OUTPATIENT)
Dept: PERIOP | Facility: HOSPITAL | Age: 70
End: 2025-07-08
Payer: COMMERCIAL

## 2025-07-08 ENCOUNTER — ANESTHESIA EVENT (OUTPATIENT)
Dept: PERIOP | Facility: HOSPITAL | Age: 70
End: 2025-07-08
Payer: COMMERCIAL

## 2025-07-08 VITALS
OXYGEN SATURATION: 99 % | BODY MASS INDEX: 28.7 KG/M2 | RESPIRATION RATE: 18 BRPM | SYSTOLIC BLOOD PRESSURE: 134 MMHG | DIASTOLIC BLOOD PRESSURE: 77 MMHG | HEIGHT: 71 IN | HEART RATE: 62 BPM | WEIGHT: 205 LBS | TEMPERATURE: 97.6 F

## 2025-07-08 DIAGNOSIS — S00.85XA FOREIGN BODY IN SKIN OF FACE: ICD-10-CM

## 2025-07-08 PROCEDURE — 88300 SURGICAL PATH GROSS: CPT | Performed by: PATHOLOGY

## 2025-07-08 PROCEDURE — 87147 CULTURE TYPE IMMUNOLOGIC: CPT | Performed by: SURGERY

## 2025-07-08 PROCEDURE — 10120 INC&RMVL FB SUBQ TISS SMPL: CPT | Performed by: SURGERY

## 2025-07-08 PROCEDURE — 87070 CULTURE OTHR SPECIMN AEROBIC: CPT | Performed by: SURGERY

## 2025-07-08 PROCEDURE — 87205 SMEAR GRAM STAIN: CPT | Performed by: SURGERY

## 2025-07-08 PROCEDURE — NC001 PR NO CHARGE: Performed by: PHYSICIAN ASSISTANT

## 2025-07-08 PROCEDURE — 87075 CULTR BACTERIA EXCEPT BLOOD: CPT | Performed by: SURGERY

## 2025-07-08 PROCEDURE — 10120 INC&RMVL FB SUBQ TISS SMPL: CPT

## 2025-07-08 RX ORDER — DEXAMETHASONE SODIUM PHOSPHATE 10 MG/ML
INJECTION, SOLUTION INTRAMUSCULAR; INTRAVENOUS AS NEEDED
Status: DISCONTINUED | OUTPATIENT
Start: 2025-07-08 | End: 2025-07-08

## 2025-07-08 RX ORDER — HYDROMORPHONE HCL/PF 1 MG/ML
0.25 SYRINGE (ML) INJECTION
Status: DISCONTINUED | OUTPATIENT
Start: 2025-07-08 | End: 2025-07-08 | Stop reason: HOSPADM

## 2025-07-08 RX ORDER — OXYCODONE HYDROCHLORIDE 5 MG/1
5 TABLET ORAL EVERY 4 HOURS PRN
Qty: 5 TABLET | Refills: 0 | Status: SHIPPED | OUTPATIENT
Start: 2025-07-08 | End: 2025-07-18

## 2025-07-08 RX ORDER — ONDANSETRON 2 MG/ML
4 INJECTION INTRAMUSCULAR; INTRAVENOUS ONCE AS NEEDED
Status: DISCONTINUED | OUTPATIENT
Start: 2025-07-08 | End: 2025-07-08 | Stop reason: HOSPADM

## 2025-07-08 RX ORDER — BUPIVACAINE HCL/EPINEPHRINE 0.25-.0005
VIAL (ML) INJECTION AS NEEDED
Status: DISCONTINUED | OUTPATIENT
Start: 2025-07-08 | End: 2025-07-08 | Stop reason: HOSPADM

## 2025-07-08 RX ORDER — MIDAZOLAM HYDROCHLORIDE 2 MG/2ML
INJECTION, SOLUTION INTRAMUSCULAR; INTRAVENOUS AS NEEDED
Status: DISCONTINUED | OUTPATIENT
Start: 2025-07-08 | End: 2025-07-08

## 2025-07-08 RX ORDER — FENTANYL CITRATE/PF 50 MCG/ML
25 SYRINGE (ML) INJECTION
Status: DISCONTINUED | OUTPATIENT
Start: 2025-07-08 | End: 2025-07-08 | Stop reason: HOSPADM

## 2025-07-08 RX ORDER — MAGNESIUM HYDROXIDE 1200 MG/15ML
LIQUID ORAL AS NEEDED
Status: DISCONTINUED | OUTPATIENT
Start: 2025-07-08 | End: 2025-07-08 | Stop reason: HOSPADM

## 2025-07-08 RX ORDER — ONDANSETRON 2 MG/ML
INJECTION INTRAMUSCULAR; INTRAVENOUS AS NEEDED
Status: DISCONTINUED | OUTPATIENT
Start: 2025-07-08 | End: 2025-07-08

## 2025-07-08 RX ORDER — ROCURONIUM BROMIDE 10 MG/ML
INJECTION, SOLUTION INTRAVENOUS AS NEEDED
Status: DISCONTINUED | OUTPATIENT
Start: 2025-07-08 | End: 2025-07-08

## 2025-07-08 RX ORDER — PROPOFOL 10 MG/ML
INJECTION, EMULSION INTRAVENOUS AS NEEDED
Status: DISCONTINUED | OUTPATIENT
Start: 2025-07-08 | End: 2025-07-08

## 2025-07-08 RX ORDER — LIDOCAINE HYDROCHLORIDE 10 MG/ML
INJECTION, SOLUTION EPIDURAL; INFILTRATION; INTRACAUDAL; PERINEURAL AS NEEDED
Status: DISCONTINUED | OUTPATIENT
Start: 2025-07-08 | End: 2025-07-08

## 2025-07-08 RX ORDER — CEFAZOLIN SODIUM 2 G/50ML
2000 SOLUTION INTRAVENOUS ONCE
Status: COMPLETED | OUTPATIENT
Start: 2025-07-08 | End: 2025-07-08

## 2025-07-08 RX ORDER — FENTANYL CITRATE 50 UG/ML
INJECTION, SOLUTION INTRAMUSCULAR; INTRAVENOUS AS NEEDED
Status: DISCONTINUED | OUTPATIENT
Start: 2025-07-08 | End: 2025-07-08

## 2025-07-08 RX ORDER — SODIUM CHLORIDE, SODIUM LACTATE, POTASSIUM CHLORIDE, CALCIUM CHLORIDE 600; 310; 30; 20 MG/100ML; MG/100ML; MG/100ML; MG/100ML
INJECTION, SOLUTION INTRAVENOUS CONTINUOUS PRN
Status: DISCONTINUED | OUTPATIENT
Start: 2025-07-08 | End: 2025-07-08

## 2025-07-08 RX ADMIN — CEFAZOLIN SODIUM 2000 MG: 2 SOLUTION INTRAVENOUS at 10:06

## 2025-07-08 RX ADMIN — MIDAZOLAM 2 MG: 1 INJECTION INTRAMUSCULAR; INTRAVENOUS at 10:06

## 2025-07-08 RX ADMIN — LIDOCAINE HYDROCHLORIDE 50 MG: 10 INJECTION, SOLUTION EPIDURAL; INFILTRATION; INTRACAUDAL; PERINEURAL at 10:17

## 2025-07-08 RX ADMIN — PROPOFOL 200 MG: 10 INJECTION, EMULSION INTRAVENOUS at 10:17

## 2025-07-08 RX ADMIN — DEXAMETHASONE SODIUM PHOSPHATE 10 MG: 10 INJECTION, SOLUTION INTRAMUSCULAR; INTRAVENOUS at 10:17

## 2025-07-08 RX ADMIN — FENTANYL CITRATE 100 MCG: 50 INJECTION INTRAMUSCULAR; INTRAVENOUS at 10:17

## 2025-07-08 RX ADMIN — SODIUM CHLORIDE, SODIUM LACTATE, POTASSIUM CHLORIDE, AND CALCIUM CHLORIDE: .6; .31; .03; .02 INJECTION, SOLUTION INTRAVENOUS at 09:32

## 2025-07-08 RX ADMIN — ROCURONIUM BROMIDE 50 MG: 10 INJECTION, SOLUTION INTRAVENOUS at 10:17

## 2025-07-08 RX ADMIN — SUGAMMADEX 200 MG: 100 INJECTION, SOLUTION INTRAVENOUS at 10:44

## 2025-07-08 RX ADMIN — DEXMEDETOMIDINE HYDROCHLORIDE 12 MCG: 100 INJECTION, SOLUTION INTRAVENOUS at 10:17

## 2025-07-08 RX ADMIN — ONDANSETRON 4 MG: 2 INJECTION INTRAMUSCULAR; INTRAVENOUS at 10:06
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
## 2025-07-08 NOTE — ANESTHESIA PREPROCEDURE EVALUATION
Procedure:  DEBRIDEMENT WOUND (WASH OUT) (Face)    Relevant Problems   ANESTHESIA (within normal limits)      CARDIO   (-) Angina at rest (HCC)   (-) Angina of effort (HCC)   (-) JURADO (dyspnea on exertion)      ENDO (within normal limits)      GI/HEPATIC   (+) Gastroesophageal reflux disease (Well controlled, no n/v)      /RENAL   (+) Prostate cancer (HCC)   (+) Stage 3 chronic kidney disease, unspecified whether stage 3a or 3b CKD (HCC)      HEMATOLOGY (within normal limits)      MUSCULOSKELETAL (within normal limits)      PULMONARY (within normal limits)   (-) URI (upper respiratory infection)      (+) chronic THC use, last used 3 days ago  Physical Exam    Airway     Mallampati score: III  TM Distance: >3 FB  Neck ROM: full  Mouth opening: >= 4 cm      Cardiovascular  Rhythm: regular, Rate: normal    Dental    poor dentition,     Pulmonary   Breath sounds clear to auscultation    Neurological    He appears awake, alert and oriented x3.      Other Findings        Anesthesia Plan  ASA Score- 2     Anesthesia Type- general with ASA Monitors.         Additional Monitors:     Airway Plan: Oral ETT.           Plan Factors-Exercise tolerance (METS): >4 METS.    Chart reviewed.    Patient summary reviewed.    Patient is a current smoker.  Patient instructed to abstain from smoking on day of procedure. Patient did not smoke on day of surgery.    Obstructive sleep apnea risk education given perioperatively.        Induction- intravenous.    Postoperative Plan- Plan for postoperative opioid use.   Monitoring Plan - Monitoring plan - standard ASA monitoring  Post Operative Pain Plan - plan for postoperative opioid use and multimodal analgesia    Perioperative Resuscitation Plan - Level 1 - Full Code.       Informed Consent- Anesthetic plan and risks discussed with patient.  I personally reviewed this patient with the CRNA. Discussed and agreed on the Anesthesia Plan with the CRNA..      NPO Status:  Vitals Value Taken Time    Date of last liquid 07/07/25 07/08/25 09:14   Time of last liquid 2100 07/08/25 09:14   Date of last solid 07/07/25 07/08/25 09:14   Time of last solid 2100 07/08/25 09:14

## 2025-07-08 NOTE — OP NOTE
OPERATIVE REPORT  PATIENT NAME: Ronny Bueno    :  1955  MRN: [id number]  Pt Location: [facility] OR ROOM 02    SURGERY DATE: 2025    Surgeons and Role:     * Robert Bloch, MD - Primary     * Heena Victoria PA-C - Assisting    Preop Diagnosis:  Foreign body in skin of face [S00.85XA]    Post-Op Diagnosis Codes:     * Foreign body in skin of face [S00.85XA]    Procedure(s):  Right - DEBRIDEMENT WOUND (WASH OUT) RIGHT FACE    Specimen(s):  ID Type Source Tests Collected by Time Destination   1 : gross exam of foreign body right face Other Foreign body TISSUE EXAM Robert Bloch, MD 2025 10:35 AM    A : aerobic and anaerobic culture right face wound Wound Wound ANAEROBIC CULTURE AND GRAM STAIN, STAT GRAM STAIN, WOUND CULTURE Robert Bloch, MD 2025 10:34 AM        Estimated Blood Loss:   Minimal    Drains:  Closed/Suction Drain Left Abdomen Bulb (Active)   Number of days: 1630       Open Drain Inferior;Right;Other (Comment) Head (Active)   Number of days: 0       Anesthesia Type:   Choice    Operative Indications:  Foreign body in skin of face [S00.85XA]  Same    Operative Findings:  Same  Infection was present at time of surgery as evidenced by fluid which was seropurulent.       Complications:   None    Procedure and Technique:  Patient is identified by me and placed in the supine position on the operating room table whereupon general endotracheal anesthesia is induced.  The patient's head was tilted to the left side down and the right face was prepped and draped in a normal surgical manner.  Actually before prepping and draping I did an intraoral examination I could not see any evidence of a mucosal tear.  Timeout was done.  Local was infiltrated and an elliptical skin incision is made taking off some necrotic skin as well as the skin glue.  Directly underneath was a foreign body.  This was removed and I actually got a few pieces out.  Wounds were now irrigated and cultures taken.  Looking down into the  wound it is obvious that this was cauterized parotid gland tissue.  No obvious vessels or duct were seen.  Wounds were again irrigated and then I placed a cigarette Penrose drain in and sewed it in place and put another couple sutures in followed by a dressing   I was present for all critical portions of the procedure., A co-surgeon was required because of skills and techniques relevant to speciality., and A physician assistant was required during the procedure for retraction, tissue handling, dissection and suturing.    Patient Disposition:  PACU          SIGNATURE: Robert Bloch, MD  DATE: July 8, 2025  TIME: 10:47 AM

## 2025-07-08 NOTE — ANESTHESIA POSTPROCEDURE EVALUATION
Post-Op Assessment Note    CV Status:  Stable  Pain Score: 0    Pain management: adequate       Mental Status:  Sleepy and arousable   PONV Controlled:  None   Airway Patency:  Patent     Post Op Vitals Reviewed: Yes    No anethesia notable event occurred.    Staff: CRNA           Last Filed PACU Vitals:  Vitals Value Taken Time   Temp 97    Pulse 65 07/08/25 10:54   /86 07/08/25 10:54   Resp 14    SpO2 100 % 07/08/25 10:54   Vitals shown include unfiled device data.

## 2025-07-08 NOTE — ASSESSMENT & PLAN NOTE
69 yo M presenting today for incision and drainage of right face for definitive treatment of foreign body of R face    Patient was initially seen by surgical team in ED yesterday (7/7) but could not remain in house for procedure, he re-presents today for definitive management    No changes to overall medical conditions  Plan for OR today for I&D R face with Dr. Bloch. I have reviewed the details of the operation and the expected postop/recovery period. We discussed the risks, benefits, alternative treatments, and possible complications related to the surgery. All questions and concerns were addressed. Patient expressed understanding and was agreeable to proceed. A written consent form was reviewed and signed by patient and myself.

## 2025-07-08 NOTE — DISCHARGE INSTR - AVS FIRST PAGE
Postoperative Care Instructions    Please leave drain and dressing on until follow up with Dr. Bloch on Thursday 7/10/25.     1. General: You may feel pulling sensations around the wound or funny aches and pains around the incisions. This is normal. Even minor surgery is a change in your body and this is your body's reaction to it. If you have had abdominal surgery, it may help to support the incision with a small pillow or blanket for comfort when moving or coughing.    2. Wound care:  The glue over the incisions will fall off over the next week or two. If you have staples or stitches, they will be removed by the physician at your follow up appointment.    3. Showering: You may shower. Please do not soak wound in standing water such as a bath, hot tub, pool, lake, etc. Do not scrub or use exfoliants on the surgical wounds.    4. Activity: You may go up and down stairs, walk as much as you are comfortable, but walk at least 3 times each day. If you have had abdominal surgery, do not perform any strenuous exercise or lift anything heavier than 15 pounds for at least 4 weeks, unless cleared by your physician.    5. Diet: You may resume your regular diet. Please drink lots of water.    6. Medications: Resume all of your previous medications, unless told otherwise by the doctor.  A good option for pain control is to start with acetaminophen (Tylenol) 650mg and ibuprofen (Advil) 600mg and alternate taking them every 3 hours.  If this is not sufficient, you make take the narcotic pain medicine as prescribed. You do not need to take the narcotic pain medication unless you are having significant pain and discomfort. Please take the narcotic medication with food. Ensure that you do not take more than 4000 mg of Tylenol per day.     7. Driving: You will need someone to drive you home on the day of surgery. Do not drive or make any important decisions while on narcotic pain medication. Generally, you may drive 48 hours after  you've stopped taking all narcotic pain medications. Generally, you may drive when you are off all narcotic pain medications, and you can turn in your seat comfortably to check your blind spot and area able to slam on the brakes while driving if needed.     8. Upset Stomach: You may take Maalox, Tums, or similar items for an upset stomach. If your narcotic pain medication causes an upset stomach, do not take it on an empty stomach. Try taking it with at least some crackers or toast.     9. Constipation: Patients often experience constipation after surgery. We recommend starting an over-the-counter medication for this, such as Metamucil, Senokot, Colace, milk of magnesia, etc. You may stop taking these medications a couple days after your last dose of narcotic medication. If you experience significant nausea or vomiting after abdominal surgery, call the office before trying any of these medications.     10. Call the office: If you are experiencing any of the following: fevers above 101.5°, significant nausea or vomiting, if the wound develops drainage and/or excessive redness around the wound, or if you have significant diarrhea or other worsening symptoms.    11. Pain: A prescription for narcotic pain medication will be sent to your pharmacy upon discharge from the hospital.

## 2025-07-08 NOTE — ANESTHESIA POSTPROCEDURE EVALUATION
Post-Op Assessment Note    CV Status:  Stable    Pain management: adequate       Mental Status:  Alert and awake   Hydration Status:  Euvolemic   PONV Controlled:  Controlled   Airway Patency:  Patent     Post Op Vitals Reviewed: Yes    No anethesia notable event occurred.    Staff: Anesthesiologist         Last Filed PACU Vitals:  Vitals Value Taken Time   Temp 97.1 °F (36.2 °C) 07/08/25 11:15   Pulse 62 07/08/25 11:15   /89 07/08/25 11:15   Resp 14 07/08/25 11:15   SpO2 97 % 07/08/25 11:15       Modified Ying:     Vitals Value Taken Time   Activity 2 07/08/25 11:15   Respiration 2 07/08/25 11:15   Circulation 2 07/08/25 11:15   Consciousness 2 07/08/25 11:15   Oxygen Saturation 2 07/08/25 11:15     Modified Ying Score: 10

## 2025-07-10 ENCOUNTER — OFFICE VISIT (OUTPATIENT)
Dept: SURGERY | Facility: CLINIC | Age: 70
End: 2025-07-10

## 2025-07-10 VITALS
HEIGHT: 71 IN | TEMPERATURE: 97.4 F | HEART RATE: 73 BPM | BODY MASS INDEX: 29.82 KG/M2 | SYSTOLIC BLOOD PRESSURE: 122 MMHG | WEIGHT: 213 LBS | OXYGEN SATURATION: 98 % | DIASTOLIC BLOOD PRESSURE: 76 MMHG

## 2025-07-10 DIAGNOSIS — Z51.89 ENCOUNTER FOR WOUND CARE: Primary | ICD-10-CM

## 2025-07-10 LAB
BACTERIA SPEC ANAEROBE CULT: NORMAL
BACTERIA WND AEROBE CULT: NORMAL
GRAM STN SPEC: NORMAL
GRAM STN SPEC: NORMAL

## 2025-07-10 PROCEDURE — 99024 POSTOP FOLLOW-UP VISIT: CPT | Performed by: SURGERY

## 2025-07-10 NOTE — PROGRESS NOTES
First postop after excision of a foreign body from his right cheek.  At that point in time I brought out over a centimeter sized piece of something along with 2 other little pieces.  There was no bleeding at that time.  There was obvious cauterized parotid tissue present.  The patient tells me he has had minimal drainage and so I removed the drain by cutting its suture.  I put a Band-Aid on and told him he should do the same and come back next Monday for me to take the other sutures out

## 2025-07-14 ENCOUNTER — OFFICE VISIT (OUTPATIENT)
Dept: SURGERY | Facility: CLINIC | Age: 70
End: 2025-07-14

## 2025-07-14 VITALS
BODY MASS INDEX: 29.82 KG/M2 | HEART RATE: 65 BPM | OXYGEN SATURATION: 97 % | WEIGHT: 213 LBS | DIASTOLIC BLOOD PRESSURE: 80 MMHG | TEMPERATURE: 96.9 F | SYSTOLIC BLOOD PRESSURE: 128 MMHG | HEIGHT: 71 IN

## 2025-07-14 DIAGNOSIS — S00.85XA FOREIGN BODY IN SKIN OF FACE: Primary | ICD-10-CM

## 2025-07-14 PROCEDURE — 99024 POSTOP FOLLOW-UP VISIT: CPT | Performed by: SURGERY

## 2025-07-14 NOTE — PROGRESS NOTES
Wound looks pretty good on examination today so I remove the sutures.  I also told him it is not fully healed so he is got to wait a few more weeks before he shaves

## (undated) DEVICE — SPLIT SHEET: Brand: CONVERTORS

## (undated) DEVICE — NEEDLE 25G X 1 1/2

## (undated) DEVICE — KERLIX BANDAGE ROLL: Brand: KERLIX

## (undated) DEVICE — Device

## (undated) DEVICE — ASTOUND STANDARD SURGICAL GOWN, XL: Brand: CONVERTORS

## (undated) DEVICE — VISUALIZATION SYSTEM: Brand: CLEARIFY

## (undated) DEVICE — STRL PENROSE DRAIN 18" X 1/4": Brand: CARDINAL HEALTH

## (undated) DEVICE — SURGICEL 4 X 8

## (undated) DEVICE — CATH FOLEY COUNCIL 20FR 5ML 2 WAY LUBRICATH

## (undated) DEVICE — PROGRASP FORCEPS: Brand: ENDOWRIST

## (undated) DEVICE — MAX-CORE® DISPOSABLE CORE BIOPSY INSTRUMENT, 18G X 20CM: Brand: MAX-CORE

## (undated) DEVICE — JACKSON-PRATT 100CC BULB RESERVOIR: Brand: CARDINAL HEALTH

## (undated) DEVICE — LUBRICANT SURGILUBE TUBE 4 OZ  FLIP TOP

## (undated) DEVICE — NEPTUNE E-SEP SMOKE EVACUATION PENCIL, COATED, 70MM BLADE, ROCKER SWITCH: Brand: NEPTUNE E-SEP

## (undated) DEVICE — GLOVE INDICATOR PI UNDERGLOVE SZ 8 BLUE

## (undated) DEVICE — SUT ETHILON 3-0 FS-1 18 IN 663G

## (undated) DEVICE — TIP COVER ACCESSORY

## (undated) DEVICE — GLOVE SRG BIOGEL 7.5

## (undated) DEVICE — PLUMEPEN PRO 10FT

## (undated) DEVICE — TISSUE RETRIEVAL SYSTEM: Brand: INZII RETRIEVAL SYSTEM

## (undated) DEVICE — LARGE NEEDLE DRIVER: Brand: ENDOWRIST

## (undated) DEVICE — SUT PROLENE 4-0 PS-2 18 IN 8682G

## (undated) DEVICE — MONOPOLAR CURVED SCISSORS: Brand: ENDOWRIST

## (undated) DEVICE — INTENDED FOR TISSUE SEPARATION, AND OTHER PROCEDURES THAT REQUIRE A SHARP SURGICAL BLADE TO PUNCTURE OR CUT.: Brand: BARD-PARKER SAFETY BLADES SIZE 11, STERILE

## (undated) DEVICE — JP PERF DRN SIL FLT 10MM FULL: Brand: CARDINAL HEALTH

## (undated) DEVICE — BETHLEHEM UNIVERSAL MINOR GEN: Brand: CARDINAL HEALTH

## (undated) DEVICE — SUT MONOCRYL 4-0 PS-2 27 IN Y426H

## (undated) DEVICE — POOLE SUCTION HANDLE: Brand: CARDINAL HEALTH

## (undated) DEVICE — SUT PDS II 0 CT-1 27 IN Z340H

## (undated) DEVICE — CHLORAPREP HI-LITE 26ML ORANGE

## (undated) DEVICE — UNDYED BRAIDED (POLYGLACTIN 910), SYNTHETIC ABSORBABLE SUTURE: Brand: COATED VICRYL

## (undated) DEVICE — INTENDED FOR TISSUE SEPARATION, AND OTHER PROCEDURES THAT REQUIRE A SHARP SURGICAL BLADE TO PUNCTURE OR CUT.: Brand: BARD-PARKER SAFETY BLADES SIZE 15, STERILE

## (undated) DEVICE — ANTIBACTERIAL VIOLET BRAIDED (POLYGLACTIN 910), SYNTHETIC ABSORBABLE SUTURE: Brand: COATED VICRYL

## (undated) DEVICE — HEAVY DUTY TABLE COVER: Brand: CONVERTORS

## (undated) DEVICE — REM POLYHESIVE ADULT PATIENT RETURN ELECTRODE: Brand: VALLEYLAB

## (undated) DEVICE — 3000CC GUARDIAN II: Brand: GUARDIAN

## (undated) DEVICE — GLOVE SRG BIOGEL ECLIPSE 7.5

## (undated) DEVICE — CHLORHEXIDINE 4PCT 4 OZ

## (undated) DEVICE — ARM DRAPE

## (undated) DEVICE — CANNULA SEAL

## (undated) DEVICE — POV-IOD SOLUTION 4OZ BT

## (undated) DEVICE — ENDOPATH PNEUMONEEDLE INSUFFLATION NEEDLES WITH LUER LOCK CONNECTORS 120MM: Brand: ENDOPATH

## (undated) DEVICE — PREMIUM DRY TRAY LF: Brand: MEDLINE INDUSTRIES, INC.

## (undated) DEVICE — SUT ETHILON 4-0 PS-2 18 IN 1667H

## (undated) DEVICE — TROCAR: Brand: KII FIOS FIRST ENTRY

## (undated) DEVICE — HEM-O-LOK CLIP CARTRIDGE LARGE DA VINCI SI/XI

## (undated) DEVICE — DRAPE SMALL FENESTRATED 30 X 30 STRL

## (undated) DEVICE — LUBRICANT INST ELECTROLUBE ANTISTK WO PAD

## (undated) DEVICE — SUT VLOC 90 3-0 90 CV-23 L9 IN VLOCM1944

## (undated) DEVICE — COLUMN DRAPE

## (undated) DEVICE — SUT VICRYL 3-0 RB-1 27 IN J215H

## (undated) DEVICE — ADHESIVE SKIN HIGH VISCOSITY EXOFIN 1ML

## (undated) DEVICE — KIT, BETHLEHEM ROBOTIC PROST: Brand: CARDINAL HEALTH

## (undated) DEVICE — SUT VLOC 90 3-0 CV-23 9 IN VLOCM0844

## (undated) DEVICE — CATH FOLEY 18FR 5ML 2 WAY SILICONE ELASTIMER

## (undated) DEVICE — URIMETER 2500ML

## (undated) DEVICE — GAUZE SPONGES,16 PLY: Brand: CURITY

## (undated) DEVICE — FENESTRATED BIPOLAR FORCEPS: Brand: ENDOWRIST